# Patient Record
Sex: MALE | Race: WHITE | NOT HISPANIC OR LATINO | Employment: UNEMPLOYED | URBAN - METROPOLITAN AREA
[De-identification: names, ages, dates, MRNs, and addresses within clinical notes are randomized per-mention and may not be internally consistent; named-entity substitution may affect disease eponyms.]

---

## 2018-12-04 ENCOUNTER — HOSPITAL ENCOUNTER (OUTPATIENT)
Dept: OTHER | Facility: HOSPITAL | Age: 63
Discharge: HOME | End: 2018-12-04
Attending: UROLOGY | Admitting: UROLOGY

## 2018-12-04 LAB
ABO + RH BLD: NORMAL
ANION GAP SERPL CALC-SCNC: 10 MMOL/L (ref 10–20)
ARMBAND: NORMAL
BASOPHILS # BLD AUTO: 0 10*3/UL (ref 0–0.2)
BASOPHILS NFR BLD AUTO: 1 % (ref 0–2)
BLD COMPONENT TYPE: NORMAL
BLD GP AB SCN SERPL QL: NEGATIVE
BUN SERPL-MCNC: 19 MG/DL (ref 8–20)
BUN/CREAT SERPL: 14.6 (ref 6.2–20.3)
CALCIUM SERPL-MCNC: 9.2 MG/DL (ref 8.9–10.3)
CHLORIDE SERPL-SCNC: 102 MMOL/L (ref 101–111)
CONV CO2: 27 MMOL/L (ref 22–32)
CREAT UR-MCNC: 1.3 MG/DL (ref 0.7–1.2)
CROSSMATCH EXPIRATION: NORMAL
DIFFERENTIAL METHOD BLD: (no result)
EOSINOPHIL # BLD AUTO: 0.2 10*3/UL (ref 0–0.3)
EOSINOPHIL # BLD AUTO: 4 % (ref 0–3)
ERYTHROCYTE [DISTWIDTH] IN BLOOD BY AUTOMATED COUNT: 14.3 % (ref 11.5–14.5)
GLUCOSE SERPL-MCNC: 90 MG/DL (ref 65–99)
HCT VFR BLD AUTO: 37.9 % (ref 40–54)
HGB BLD-MCNC: 13.4 G/DL (ref 14–18)
LYMPHOCYTES # BLD AUTO: 1 10*3/UL (ref 0.8–4.8)
LYMPHOCYTES NFR BLD AUTO: 17 % (ref 18–42)
MCH RBC QN AUTO: 30.2 PG (ref 26–32)
MCHC RBC AUTO-ENTMCNC: 35.3 G/DL (ref 32–36)
MCV RBC AUTO: 85.6 FL (ref 80–94)
MONOCYTES # BLD AUTO: 0.5 10*3/UL (ref 0.1–1.3)
MONOCYTES NFR BLD AUTO: 8 % (ref 2–11)
NEUTROPHILS # BLD AUTO: 4.2 10*3/UL (ref 2.3–8.6)
NEUTROPHILS NFR BLD AUTO: 70 % (ref 50–75)
NRBC BLD AUTO-RTO: 0 /100{WBCS}
NRBC/RBC NFR BLD MANUAL: 0 10*3/UL
PLATELET # BLD AUTO: 357 10*3/UL (ref 150–450)
PMV BLD AUTO: 6 FL (ref 7.4–10.4)
POTASSIUM SERPL-SCNC: 4 MMOL/L (ref 3.6–5.1)
RBC # BLD AUTO: 4.43 10*6/UL (ref 4.6–6)
SODIUM SERPL-SCNC: 135 MMOL/L (ref 136–144)
WBC # BLD AUTO: 5.9 10*3/UL (ref 4.5–11.5)

## 2018-12-10 ENCOUNTER — HOSPITAL ENCOUNTER (OUTPATIENT)
Dept: OTHER | Facility: HOSPITAL | Age: 63
Setting detail: SPECIMEN
Discharge: HOME | End: 2018-12-10
Attending: UROLOGY | Admitting: UROLOGY

## 2018-12-11 LAB
SPECIMEN SOURCE: NORMAL
STONE ANALYSIS-IMP: NORMAL
STONE COMMENT: NORMAL

## 2019-01-01 ENCOUNTER — APPOINTMENT (OUTPATIENT)
Dept: GENERAL RADIOLOGY | Facility: HOSPITAL | Age: 64
End: 2019-01-01

## 2019-01-01 ENCOUNTER — HOSPITAL ENCOUNTER (INPATIENT)
Facility: HOSPITAL | Age: 64
LOS: 3 days | Discharge: NURSING, REHAB OR LONG-TERM CARE | End: 2019-01-05
Attending: EMERGENCY MEDICINE | Admitting: HOSPITALIST

## 2019-01-01 DIAGNOSIS — N10 PYELONEPHRITIS, ACUTE: Primary | ICD-10-CM

## 2019-01-01 LAB
ALBUMIN SERPL-MCNC: 3.9 G/DL (ref 3.5–5.2)
ALBUMIN/GLOB SERPL: 1.2 G/DL
ALP SERPL-CCNC: 96 U/L (ref 40–129)
ALT SERPL W P-5'-P-CCNC: 8 U/L (ref 5–41)
ANION GAP SERPL CALCULATED.3IONS-SCNC: 13.9 MMOL/L
AST SERPL-CCNC: 16 U/L (ref 5–40)
BACTERIA UR QL AUTO: ABNORMAL /HPF
BASOPHILS # BLD AUTO: 0.03 10*3/MM3 (ref 0–0.2)
BASOPHILS NFR BLD AUTO: 0.3 % (ref 0–2)
BILIRUB SERPL-MCNC: 0.3 MG/DL (ref 0.2–1.2)
BILIRUB UR QL STRIP: NEGATIVE
BUN BLD-MCNC: 17 MG/DL (ref 8–23)
BUN/CREAT SERPL: 12.1 (ref 7–25)
CALCIUM SPEC-SCNC: 9.4 MG/DL (ref 8.8–10.5)
CHLORIDE SERPL-SCNC: 98 MMOL/L (ref 98–107)
CLARITY UR: ABNORMAL
CO2 SERPL-SCNC: 22.1 MMOL/L (ref 22–29)
COLOR UR: YELLOW
CREAT BLD-MCNC: 1.41 MG/DL (ref 0.76–1.27)
D-LACTATE SERPL-SCNC: 2.3 MMOL/L (ref 0.5–2)
DEPRECATED RDW RBC AUTO: 41.6 FL (ref 37–54)
EOSINOPHIL # BLD AUTO: 0.02 10*3/MM3 (ref 0.1–0.3)
EOSINOPHIL NFR BLD AUTO: 0.2 % (ref 0–4)
ERYTHROCYTE [DISTWIDTH] IN BLOOD BY AUTOMATED COUNT: 13.2 % (ref 11.5–14.5)
FLUAV AG NPH QL: NEGATIVE
FLUBV AG NPH QL IA: NEGATIVE
GFR SERPL CREATININE-BSD FRML MDRD: 51 ML/MIN/1.73
GLOBULIN UR ELPH-MCNC: 3.3 GM/DL
GLUCOSE BLD-MCNC: 132 MG/DL (ref 65–99)
GLUCOSE UR STRIP-MCNC: NEGATIVE MG/DL
HCT VFR BLD AUTO: 38.5 % (ref 42–52)
HGB BLD-MCNC: 12.4 G/DL (ref 14–18)
HGB UR QL STRIP.AUTO: ABNORMAL
HYALINE CASTS UR QL AUTO: ABNORMAL /LPF
IMM GRANULOCYTES # BLD AUTO: 0.02 10*3/MM3 (ref 0–0.03)
IMM GRANULOCYTES NFR BLD AUTO: 0.2 % (ref 0–0.5)
KETONES UR QL STRIP: NEGATIVE
LEUKOCYTE ESTERASE UR QL STRIP.AUTO: ABNORMAL
LYMPHOCYTES # BLD AUTO: 0.5 10*3/MM3 (ref 0.6–4.8)
LYMPHOCYTES NFR BLD AUTO: 5.1 % (ref 20–45)
MCH RBC QN AUTO: 27.9 PG (ref 27–31)
MCHC RBC AUTO-ENTMCNC: 32.2 G/DL (ref 31–37)
MCV RBC AUTO: 86.7 FL (ref 80–94)
MONOCYTES # BLD AUTO: 0.67 10*3/MM3 (ref 0–1)
MONOCYTES NFR BLD AUTO: 6.9 % (ref 3–8)
NEUTROPHILS # BLD AUTO: 8.47 10*3/MM3 (ref 1.5–8.3)
NEUTROPHILS NFR BLD AUTO: 87.3 % (ref 45–70)
NITRITE UR QL STRIP: NEGATIVE
NRBC BLD AUTO-RTO: 0 /100 WBC (ref 0–0)
PH UR STRIP.AUTO: 6.5 [PH] (ref 4.5–8)
PLATELET # BLD AUTO: 265 10*3/MM3 (ref 140–500)
PMV BLD AUTO: 8.1 FL (ref 7.4–10.4)
POTASSIUM BLD-SCNC: 4.4 MMOL/L (ref 3.5–5.2)
PROCALCITONIN SERPL-MCNC: 0.09 NG/ML (ref 0.1–0.25)
PROT SERPL-MCNC: 7.2 G/DL (ref 6–8.5)
PROT UR QL STRIP: ABNORMAL
RBC # BLD AUTO: 4.44 10*6/MM3 (ref 4.7–6.1)
RBC # UR: ABNORMAL /HPF
REF LAB TEST METHOD: ABNORMAL
SODIUM BLD-SCNC: 134 MMOL/L (ref 136–145)
SP GR UR STRIP: 1.02 (ref 1–1.03)
SQUAMOUS #/AREA URNS HPF: ABNORMAL /HPF
UROBILINOGEN UR QL STRIP: ABNORMAL
WBC NRBC COR # BLD: 9.71 10*3/MM3 (ref 4.8–10.8)
WBC UR QL AUTO: ABNORMAL /HPF

## 2019-01-01 PROCEDURE — 72100 X-RAY EXAM L-S SPINE 2/3 VWS: CPT

## 2019-01-01 PROCEDURE — 87077 CULTURE AEROBIC IDENTIFY: CPT | Performed by: EMERGENCY MEDICINE

## 2019-01-01 PROCEDURE — 87186 SC STD MICRODIL/AGAR DIL: CPT | Performed by: EMERGENCY MEDICINE

## 2019-01-01 PROCEDURE — 99284 EMERGENCY DEPT VISIT MOD MDM: CPT | Performed by: EMERGENCY MEDICINE

## 2019-01-01 PROCEDURE — 81001 URINALYSIS AUTO W/SCOPE: CPT | Performed by: EMERGENCY MEDICINE

## 2019-01-01 PROCEDURE — 71046 X-RAY EXAM CHEST 2 VIEWS: CPT

## 2019-01-01 PROCEDURE — 87086 URINE CULTURE/COLONY COUNT: CPT | Performed by: EMERGENCY MEDICINE

## 2019-01-01 PROCEDURE — 80053 COMPREHEN METABOLIC PANEL: CPT | Performed by: EMERGENCY MEDICINE

## 2019-01-01 PROCEDURE — 83605 ASSAY OF LACTIC ACID: CPT | Performed by: EMERGENCY MEDICINE

## 2019-01-01 PROCEDURE — 87040 BLOOD CULTURE FOR BACTERIA: CPT | Performed by: EMERGENCY MEDICINE

## 2019-01-01 PROCEDURE — 85025 COMPLETE CBC W/AUTO DIFF WBC: CPT | Performed by: EMERGENCY MEDICINE

## 2019-01-01 PROCEDURE — 87804 INFLUENZA ASSAY W/OPTIC: CPT | Performed by: EMERGENCY MEDICINE

## 2019-01-01 PROCEDURE — 84145 PROCALCITONIN (PCT): CPT | Performed by: EMERGENCY MEDICINE

## 2019-01-01 PROCEDURE — 99284 EMERGENCY DEPT VISIT MOD MDM: CPT

## 2019-01-01 RX ORDER — SODIUM CHLORIDE 0.9 % (FLUSH) 0.9 %
10 SYRINGE (ML) INJECTION AS NEEDED
Status: DISCONTINUED | OUTPATIENT
Start: 2019-01-01 | End: 2019-01-05 | Stop reason: HOSPADM

## 2019-01-01 RX ORDER — ACETAMINOPHEN 500 MG
TABLET ORAL
Status: DISPENSED
Start: 2019-01-01 | End: 2019-01-02

## 2019-01-01 RX ORDER — ACETAMINOPHEN 500 MG
1000 TABLET ORAL ONCE
Status: COMPLETED | OUTPATIENT
Start: 2019-01-01 | End: 2019-01-01

## 2019-01-01 RX ADMIN — SODIUM CHLORIDE 1000 ML: 900 INJECTION, SOLUTION INTRAVENOUS at 23:17

## 2019-01-01 RX ADMIN — ACETAMINOPHEN 1000 MG: 500 TABLET, FILM COATED ORAL at 23:12

## 2019-01-02 ENCOUNTER — APPOINTMENT (OUTPATIENT)
Dept: CT IMAGING | Facility: HOSPITAL | Age: 64
End: 2019-01-02

## 2019-01-02 PROBLEM — A41.9 SEPSIS (HCC): Status: ACTIVE | Noted: 2019-01-02

## 2019-01-02 LAB
ANION GAP SERPL CALCULATED.3IONS-SCNC: 9.1 MMOL/L
BASOPHILS # BLD AUTO: 0.01 10*3/MM3 (ref 0–0.2)
BASOPHILS NFR BLD AUTO: 0.1 % (ref 0–2)
BUN BLD-MCNC: 17 MG/DL (ref 8–23)
BUN/CREAT SERPL: 12.3 (ref 7–25)
CALCIUM SPEC-SCNC: 8.1 MG/DL (ref 8.8–10.5)
CHLORIDE SERPL-SCNC: 103 MMOL/L (ref 98–107)
CO2 SERPL-SCNC: 22.9 MMOL/L (ref 22–29)
CREAT BLD-MCNC: 1.38 MG/DL (ref 0.76–1.27)
D-LACTATE SERPL-SCNC: 1.7 MMOL/L (ref 0.5–2)
DEPRECATED RDW RBC AUTO: 42.5 FL (ref 37–54)
EOSINOPHIL # BLD AUTO: 0.03 10*3/MM3 (ref 0.1–0.3)
EOSINOPHIL NFR BLD AUTO: 0.4 % (ref 0–4)
ERYTHROCYTE [DISTWIDTH] IN BLOOD BY AUTOMATED COUNT: 13.4 % (ref 11.5–14.5)
GFR SERPL CREATININE-BSD FRML MDRD: 52 ML/MIN/1.73
GLUCOSE BLD-MCNC: 95 MG/DL (ref 65–99)
HCT VFR BLD AUTO: 30.6 % (ref 42–52)
HGB BLD-MCNC: 9.9 G/DL (ref 14–18)
HOLD SPECIMEN: NORMAL
IMM GRANULOCYTES # BLD AUTO: 0.03 10*3/MM3 (ref 0–0.03)
IMM GRANULOCYTES NFR BLD AUTO: 0.4 % (ref 0–0.5)
LYMPHOCYTES # BLD AUTO: 0.35 10*3/MM3 (ref 0.6–4.8)
LYMPHOCYTES NFR BLD AUTO: 4.5 % (ref 20–45)
MCH RBC QN AUTO: 28.4 PG (ref 27–31)
MCHC RBC AUTO-ENTMCNC: 32.4 G/DL (ref 31–37)
MCV RBC AUTO: 87.7 FL (ref 80–94)
MONOCYTES # BLD AUTO: 0.66 10*3/MM3 (ref 0–1)
MONOCYTES NFR BLD AUTO: 8.5 % (ref 3–8)
NEUTROPHILS # BLD AUTO: 6.66 10*3/MM3 (ref 1.5–8.3)
NEUTROPHILS NFR BLD AUTO: 86.1 % (ref 45–70)
NRBC BLD AUTO-RTO: 0 /100 WBC (ref 0–0)
PLATELET # BLD AUTO: 222 10*3/MM3 (ref 140–500)
PMV BLD AUTO: 8.1 FL (ref 7.4–10.4)
POTASSIUM BLD-SCNC: 3.9 MMOL/L (ref 3.5–5.2)
RBC # BLD AUTO: 3.49 10*6/MM3 (ref 4.7–6.1)
SODIUM BLD-SCNC: 135 MMOL/L (ref 136–145)
WBC NRBC COR # BLD: 7.74 10*3/MM3 (ref 4.8–10.8)

## 2019-01-02 PROCEDURE — 74178 CT ABD&PLV WO CNTR FLWD CNTR: CPT

## 2019-01-02 PROCEDURE — 85025 COMPLETE CBC W/AUTO DIFF WBC: CPT | Performed by: INTERNAL MEDICINE

## 2019-01-02 PROCEDURE — 99223 1ST HOSP IP/OBS HIGH 75: CPT | Performed by: INTERNAL MEDICINE

## 2019-01-02 PROCEDURE — 25010000002 VANCOMYCIN 1 G RECONSTITUTED SOLUTION 1 EACH VIAL: Performed by: INTERNAL MEDICINE

## 2019-01-02 PROCEDURE — 83605 ASSAY OF LACTIC ACID: CPT | Performed by: EMERGENCY MEDICINE

## 2019-01-02 PROCEDURE — 0 IOPAMIDOL PER 1 ML: Performed by: INTERNAL MEDICINE

## 2019-01-02 PROCEDURE — 25010000002 MEROPENEM PER 100 MG: Performed by: INTERNAL MEDICINE

## 2019-01-02 PROCEDURE — 80048 BASIC METABOLIC PNL TOTAL CA: CPT | Performed by: INTERNAL MEDICINE

## 2019-01-02 RX ORDER — IBUPROFEN 800 MG/1
800 TABLET ORAL EVERY 8 HOURS PRN
COMMUNITY
End: 2020-11-23 | Stop reason: HOSPADM

## 2019-01-02 RX ORDER — ONDANSETRON 2 MG/ML
4 INJECTION INTRAMUSCULAR; INTRAVENOUS EVERY 6 HOURS PRN
Status: DISCONTINUED | OUTPATIENT
Start: 2019-01-02 | End: 2019-01-05 | Stop reason: HOSPADM

## 2019-01-02 RX ORDER — POLYETHYLENE GLYCOL 3350 17 G/17G
8.5 POWDER, FOR SOLUTION ORAL DAILY
COMMUNITY
End: 2020-11-23 | Stop reason: HOSPADM

## 2019-01-02 RX ORDER — PANTOPRAZOLE SODIUM 40 MG/1
40 TABLET, DELAYED RELEASE ORAL EVERY MORNING
Status: DISCONTINUED | OUTPATIENT
Start: 2019-01-02 | End: 2019-01-05 | Stop reason: HOSPADM

## 2019-01-02 RX ORDER — ONDANSETRON 4 MG/1
4 TABLET, ORALLY DISINTEGRATING ORAL EVERY 6 HOURS PRN
Status: DISCONTINUED | OUTPATIENT
Start: 2019-01-02 | End: 2019-01-05 | Stop reason: HOSPADM

## 2019-01-02 RX ORDER — ESCITALOPRAM OXALATE 10 MG/1
20 TABLET ORAL DAILY
Status: DISCONTINUED | OUTPATIENT
Start: 2019-01-02 | End: 2019-01-05 | Stop reason: HOSPADM

## 2019-01-02 RX ORDER — SODIUM CHLORIDE 9 MG/ML
INJECTION, SOLUTION INTRAVENOUS
Status: DISPENSED
Start: 2019-01-02 | End: 2019-01-02

## 2019-01-02 RX ORDER — CHOLECALCIFEROL (VITAMIN D3) 125 MCG
10 CAPSULE ORAL NIGHTLY
Status: DISCONTINUED | OUTPATIENT
Start: 2019-01-02 | End: 2019-01-05 | Stop reason: HOSPADM

## 2019-01-02 RX ORDER — SODIUM CHLORIDE 0.9 % (FLUSH) 0.9 %
3 SYRINGE (ML) INJECTION EVERY 12 HOURS SCHEDULED
Status: DISCONTINUED | OUTPATIENT
Start: 2019-01-02 | End: 2019-01-05 | Stop reason: HOSPADM

## 2019-01-02 RX ORDER — SODIUM CHLORIDE, SODIUM LACTATE, POTASSIUM CHLORIDE, CALCIUM CHLORIDE 600; 310; 30; 20 MG/100ML; MG/100ML; MG/100ML; MG/100ML
75 INJECTION, SOLUTION INTRAVENOUS CONTINUOUS
Status: DISCONTINUED | OUTPATIENT
Start: 2019-01-02 | End: 2019-01-05 | Stop reason: HOSPADM

## 2019-01-02 RX ORDER — SENNA AND DOCUSATE SODIUM 50; 8.6 MG/1; MG/1
2 TABLET, FILM COATED ORAL NIGHTLY PRN
Status: DISCONTINUED | OUTPATIENT
Start: 2019-01-02 | End: 2019-01-05 | Stop reason: HOSPADM

## 2019-01-02 RX ORDER — KETOCONAZOLE 20 MG/G
CREAM TOPICAL DAILY
COMMUNITY
End: 2020-11-23 | Stop reason: HOSPADM

## 2019-01-02 RX ORDER — QUETIAPINE FUMARATE 50 MG/1
150 TABLET, EXTENDED RELEASE ORAL NIGHTLY
Status: DISCONTINUED | OUTPATIENT
Start: 2019-01-02 | End: 2019-01-05 | Stop reason: HOSPADM

## 2019-01-02 RX ORDER — DOCUSATE SODIUM 100 MG/1
100 CAPSULE, LIQUID FILLED ORAL EVERY OTHER DAY
Status: DISCONTINUED | OUTPATIENT
Start: 2019-01-02 | End: 2019-01-05 | Stop reason: HOSPADM

## 2019-01-02 RX ORDER — SODIUM CHLORIDE 0.9 % (FLUSH) 0.9 %
1-10 SYRINGE (ML) INJECTION AS NEEDED
Status: DISCONTINUED | OUTPATIENT
Start: 2019-01-02 | End: 2019-01-05 | Stop reason: HOSPADM

## 2019-01-02 RX ORDER — ACETAMINOPHEN 325 MG/1
650 TABLET ORAL EVERY 6 HOURS PRN
COMMUNITY

## 2019-01-02 RX ORDER — CETIRIZINE HYDROCHLORIDE 10 MG/1
10 TABLET ORAL DAILY
Status: DISCONTINUED | OUTPATIENT
Start: 2019-01-02 | End: 2019-01-05 | Stop reason: HOSPADM

## 2019-01-02 RX ORDER — ACETAMINOPHEN 500 MG
TABLET ORAL
Status: COMPLETED
Start: 2019-01-02 | End: 2019-01-02

## 2019-01-02 RX ORDER — NALOXONE HCL 0.4 MG/ML
0.4 VIAL (ML) INJECTION
Status: DISCONTINUED | OUTPATIENT
Start: 2019-01-02 | End: 2019-01-05 | Stop reason: HOSPADM

## 2019-01-02 RX ORDER — TRIMETHOPRIM 100 MG/1
100 TABLET ORAL 2 TIMES DAILY
COMMUNITY
End: 2019-01-05 | Stop reason: HOSPADM

## 2019-01-02 RX ORDER — VANCOMYCIN HYDROCHLORIDE 1 G/20ML
INJECTION, POWDER, LYOPHILIZED, FOR SOLUTION INTRAVENOUS
Status: DISPENSED
Start: 2019-01-02 | End: 2019-01-02

## 2019-01-02 RX ORDER — DEXTROSE, SODIUM CHLORIDE, SODIUM LACTATE, POTASSIUM CHLORIDE, AND CALCIUM CHLORIDE 5; .6; .31; .03; .02 G/100ML; G/100ML; G/100ML; G/100ML; G/100ML
125 INJECTION, SOLUTION INTRAVENOUS CONTINUOUS
Status: DISCONTINUED | OUTPATIENT
Start: 2019-01-02 | End: 2019-01-02

## 2019-01-02 RX ORDER — ACETAMINOPHEN 325 MG/1
650 TABLET ORAL EVERY 4 HOURS PRN
Status: DISCONTINUED | OUTPATIENT
Start: 2019-01-02 | End: 2019-01-05 | Stop reason: HOSPADM

## 2019-01-02 RX ORDER — ONDANSETRON 4 MG/1
4 TABLET, FILM COATED ORAL EVERY 6 HOURS PRN
Status: DISCONTINUED | OUTPATIENT
Start: 2019-01-02 | End: 2019-01-05 | Stop reason: HOSPADM

## 2019-01-02 RX ORDER — ACETAMINOPHEN 500 MG
1000 TABLET ORAL ONCE
Status: DISCONTINUED | OUTPATIENT
Start: 2019-01-02 | End: 2019-01-05 | Stop reason: HOSPADM

## 2019-01-02 RX ORDER — AZTREONAM 1 G/1
INJECTION, POWDER, LYOPHILIZED, FOR SOLUTION INTRAMUSCULAR; INTRAVENOUS
Status: DISPENSED
Start: 2019-01-02 | End: 2019-01-02

## 2019-01-02 RX ORDER — SODIUM CHLORIDE 9 MG/ML
40 INJECTION, SOLUTION INTRAVENOUS AS NEEDED
Status: DISCONTINUED | OUTPATIENT
Start: 2019-01-02 | End: 2019-01-05 | Stop reason: HOSPADM

## 2019-01-02 RX ORDER — MEROPENEM 1 G/1
INJECTION, POWDER, FOR SOLUTION INTRAVENOUS
Status: DISPENSED
Start: 2019-01-02 | End: 2019-01-02

## 2019-01-02 RX ORDER — TAMSULOSIN HYDROCHLORIDE 0.4 MG/1
0.4 CAPSULE ORAL NIGHTLY
Status: DISCONTINUED | OUTPATIENT
Start: 2019-01-02 | End: 2019-01-05 | Stop reason: HOSPADM

## 2019-01-02 RX ADMIN — ACETAMINOPHEN 650 MG: 325 TABLET, FILM COATED ORAL at 20:52

## 2019-01-02 RX ADMIN — TAMSULOSIN HYDROCHLORIDE 0.4 MG: 0.4 CAPSULE ORAL at 20:31

## 2019-01-02 RX ADMIN — SODIUM CHLORIDE 1000 MG: 900 INJECTION, SOLUTION INTRAVENOUS at 05:13

## 2019-01-02 RX ADMIN — MEROPENEM 1 G: 1 INJECTION, POWDER, FOR SOLUTION INTRAVENOUS at 19:06

## 2019-01-02 RX ADMIN — PANTOPRAZOLE SODIUM 40 MG: 40 TABLET, DELAYED RELEASE ORAL at 06:25

## 2019-01-02 RX ADMIN — MEROPENEM 1 G: 1 INJECTION, POWDER, FOR SOLUTION INTRAVENOUS at 05:12

## 2019-01-02 RX ADMIN — MEROPENEM 1 G: 1 INJECTION, POWDER, FOR SOLUTION INTRAVENOUS at 10:30

## 2019-01-02 RX ADMIN — ACETAMINOPHEN 500 MG: 500 TABLET, FILM COATED ORAL at 14:24

## 2019-01-02 RX ADMIN — MELATONIN TAB 5 MG 10 MG: 5 TAB at 20:29

## 2019-01-02 RX ADMIN — DOCUSATE SODIUM 100 MG: 100 CAPSULE, LIQUID FILLED ORAL at 09:18

## 2019-01-02 RX ADMIN — CETIRIZINE HYDROCHLORIDE 10 MG: 10 TABLET, FILM COATED ORAL at 09:17

## 2019-01-02 RX ADMIN — QUETIAPINE FUMARATE 150 MG: 50 TABLET, EXTENDED RELEASE ORAL at 20:30

## 2019-01-02 RX ADMIN — SODIUM CHLORIDE 1000 MG: 900 INJECTION, SOLUTION INTRAVENOUS at 17:37

## 2019-01-02 RX ADMIN — ESCITALOPRAM OXALATE 20 MG: 10 TABLET, FILM COATED ORAL at 09:18

## 2019-01-02 RX ADMIN — SODIUM CHLORIDE, POTASSIUM CHLORIDE, SODIUM LACTATE AND CALCIUM CHLORIDE 125 ML/HR: 600; 310; 30; 20 INJECTION, SOLUTION INTRAVENOUS at 15:19

## 2019-01-02 RX ADMIN — SODIUM CHLORIDE, POTASSIUM CHLORIDE, SODIUM LACTATE AND CALCIUM CHLORIDE 125 ML/HR: 600; 310; 30; 20 INJECTION, SOLUTION INTRAVENOUS at 07:24

## 2019-01-02 RX ADMIN — IOPAMIDOL 100 ML: 755 INJECTION, SOLUTION INTRAVENOUS at 06:30

## 2019-01-02 RX ADMIN — SODIUM CHLORIDE, POTASSIUM CHLORIDE, SODIUM LACTATE AND CALCIUM CHLORIDE 1000 ML: 600; 310; 30; 20 INJECTION, SOLUTION INTRAVENOUS at 04:08

## 2019-01-02 RX ADMIN — SODIUM CHLORIDE, POTASSIUM CHLORIDE, SODIUM LACTATE AND CALCIUM CHLORIDE 125 ML/HR: 600; 310; 30; 20 INJECTION, SOLUTION INTRAVENOUS at 23:22

## 2019-01-02 RX ADMIN — ACETAMINOPHEN 650 MG: 325 TABLET, FILM COATED ORAL at 05:24

## 2019-01-02 NOTE — ED PROVIDER NOTES
Subjective   History of Present Illness  History of Present Illness    Chief complaint: Fall with possible back injury    Location: Patient fell [place]    Quality/Severity:  Minor fall    Timing/Duration: Patient fell approximately one hour prior to presentation    Modifying Factors: On December 31 the patient did have outpatient urologic surgery    Associated Symptoms: Patient having more difficulty with ambulation than usual    Narrative: The patient is a 63-year-old white male who presents as noted above.  The patient does have some mental retardation and does not communicate well.  It was reported that the patient fell out of his bed onto a tile floor.  Caregiver reports that the patient seldom complains of pain and it was noted that he was acting as if he were in pain and not walking well.    Review of Systems   Reason unable to perform ROS: Review of systems not obtainable due to lack of verbal skills.       Past Medical History:   Diagnosis Date   • Coronary artery disease    • Injury of back    • Kidney stones    • Mental retardation        Allergies   Allergen Reactions   • Keflex [Cephalexin]        Past Surgical History:   Procedure Laterality Date   • KIDNEY SURGERY     • TONSILLECTOMY         History reviewed. No pertinent family history.    Social History     Socioeconomic History   • Marital status: Single     Spouse name: Not on file   • Number of children: Not on file   • Years of education: Not on file   • Highest education level: Not on file   Tobacco Use   • Smoking status: Never Smoker   Substance and Sexual Activity   • Alcohol use: No   • Drug use: No           Objective   Physical Exam   Constitutional:   The patient is a 63-year-old, white male who appears older than his stated age.  No acute distress noted.   HENT:   Head: Normocephalic and atraumatic.   Eyes: Conjunctivae and EOM are normal. Pupils are equal, round, and reactive to light.   Neck: Normal range of  motion. Neck supple.   Cardiovascular: Regular rhythm and normal heart sounds.   Tachycardia   Pulmonary/Chest: Effort normal and breath sounds normal. No respiratory distress.   Occasional, mild, dry cough.   Abdominal: Soft. Bowel sounds are normal.   Musculoskeletal:   Left flank with friends bandages in place.  No visible staining of the bandages and there was no surrounding erythema.  The area was tender to palpation.   Neurological: He is alert.   Skin: Skin is dry.   The integument was noticeably hot to touch.       Procedures           ED Course  ED Course as of Jan 02 0124   Tue Jan 01, 2019   2247 Rectal temperature 102.3°F.  Fever workup already instituted.  [ML]   Wed Jan 02, 2019   0056 Case and findings discussed with the hospitalist, Dr. Cummings, who requested urology be consult.  Dr. Ayala was contacted concerning this patient of Dr. Ambrocio.  Patient will be admitted to the hospitalist here and Dr. Cummings spoken to again.  Caregiver informed of treatment plan and they were agreeable.  [ML]      ED Course User Index  [ML] Yosef Dominguez MD                  MDM  Number of Diagnoses or Management Options  Pyelonephritis, acute: new and requires workup     Amount and/or Complexity of Data Reviewed  Clinical lab tests: ordered and reviewed  Tests in the radiology section of CPT®: ordered and reviewed  Independent visualization of images, tracings, or specimens: yes    Risk of Complications, Morbidity, and/or Mortality  Presenting problems: high  Diagnostic procedures: high  Management options: high    Critical Care  Total time providing critical care: 30-74 minutes    Patient Progress  Patient progress: stable    Labs this visit  Lab Results (last 24 hours)     Procedure Component Value Units Date/Time    Blood Culture - Blood, Hand, Right [[id number]] Collected:  01/01/19 2300    Specimen:  Blood from Hand, Right Updated:  01/02/19 0053    CBC & Differential [[id number]] Collected:  01/01/19 2303     Specimen:  Blood Updated:  01/01/19 2319    Narrative:       The following orders were created for panel order CBC & Differential.  Procedure                               Abnormality         Status                     ---------                               -----------         ------                     CBC Auto Differential[[id number]]        Abnormal            Final result                 Please view results for these tests on the individual orders.    Comprehensive Metabolic Panel [[id number]]  (Abnormal) Collected:  01/01/19 2303    Specimen:  Blood Updated:  01/01/19 2354     Glucose 132 mg/dL      BUN 17 mg/dL      Creatinine 1.41 mg/dL      Sodium 134 mmol/L      Potassium 4.4 mmol/L      Chloride 98 mmol/L      CO2 22.1 mmol/L      Calcium 9.4 mg/dL      Total Protein 7.2 g/dL      Albumin 3.90 g/dL      ALT (SGPT) 8 U/L      AST (SGOT) 16 U/L      Alkaline Phosphatase 96 U/L      Total Bilirubin 0.3 mg/dL      eGFR Non African Amer 51 mL/min/1.73      Globulin 3.3 gm/dL      A/G Ratio 1.2 g/dL      BUN/Creatinine Ratio 12.1     Anion Gap 13.9 mmol/L     Urinalysis With Microscopic If Indicated (No Culture) - Urine, Catheter [[id number]]  (Abnormal) Collected:  01/01/19 2303    Specimen:  Urine, Catheter Updated:  01/01/19 2329     Color, UA Yellow     Appearance, UA Slightly Cloudy     pH, UA 6.5     Specific Gravity, UA 1.025     Glucose, UA Negative     Ketones, UA Negative     Bilirubin, UA Negative     Blood, UA Large (3+)     Protein, UA >=300 mg/dL (3+)     Leuk Esterase, UA Moderate (2+)     Nitrite, UA Negative     Urobilinogen, UA 0.2 E.U./dL    Lactic Acid, Plasma [[id number]]  (Abnormal) Collected:  01/01/19 2303    Specimen:  Blood Updated:  01/01/19 2344     Lactate 2.3 mmol/L     Procalcitonin [[id number]]  (Abnormal) Collected:  01/01/19 2303    Specimen:  Blood Updated:  01/01/19 2345     Procalcitonin 0.09 ng/mL     Narrative:       As a Marker for Sepsis (Non-Neonates):   1. <0.5 ng/mL  represents a low risk of severe sepsis and/or septic shock.  2. >2 ng/mL represents a high risk of severe sepsis and/or septic shock.    As a Marker for Lower Respiratory Tract Infections that require antibiotic therapy:    PCT on Admission     Antibiotic Therapy       6-12 Hrs later  > 0.5                Strongly Recommended             >0.25 - <0.5         Recommended  0.1 - 0.25           Discouraged              Remeasure/reassess PCT  <0.1                 Strongly Discouraged     Remeasure/reassess PCT                     PCT values of < 0.5 ng/mL do not exclude an infection, because localized infections (without systemic signs) may be associated with such low concentrations, or a systemic infection in its initial stages (< 6 hours). Furthermore, increased PCT can occur without infection. PCT concentrations between 0.5 and 2.0 ng/mL should be interpreted taking into account the patient's history. It is recommended to retest PCT within 6-24 hours if any concentrations < 2 ng/mL are obtained.    Influenza Antigen, Rapid - Swab, Nasopharynx [[id number]]  (Normal) Collected:  01/01/19 2303    Specimen:  Swab from Nasopharynx Updated:  01/01/19 2331     Influenza A Ag, EIA Negative     Influenza B Ag, EIA Negative    CBC Auto Differential [[id number]]  (Abnormal) Collected:  01/01/19 2303    Specimen:  Blood Updated:  01/01/19 2319     WBC 9.71 10*3/mm3      RBC 4.44 10*6/mm3      Hemoglobin 12.4 g/dL      Hematocrit 38.5 %      MCV 86.7 fL      MCH 27.9 pg      MCHC 32.2 g/dL      RDW 13.2 %      RDW-SD 41.6 fl      MPV 8.1 fL      Platelets 265 10*3/mm3      Neutrophil % 87.3 %      Lymphocyte % 5.1 %      Monocyte % 6.9 %      Eosinophil % 0.2 %      Basophil % 0.3 %      Immature Grans % 0.2 %      Neutrophils, Absolute 8.47 10*3/mm3      Lymphocytes, Absolute 0.50 10*3/mm3      Monocytes, Absolute 0.67 10*3/mm3      Eosinophils, Absolute 0.02 10*3/mm3      Basophils, Absolute 0.03 10*3/mm3      Immature Grans,  Absolute 0.02 10*3/mm3      nRBC 0.0 /100 WBC     Urinalysis, Microscopic Only - Urine, Catheter [[id number]]  (Abnormal) Collected:  01/01/19 2303    Specimen:  Urine, Catheter Updated:  01/01/19 2331     RBC, UA 6-12 /HPF      WBC, UA 3-5 /HPF      Bacteria, UA 2+ /HPF      Squamous Epithelial Cells, UA None Seen /HPF      Hyaline Casts, UA None Seen /LPF      Methodology Manual Light Microscopy    Lactic Acid, Reflex Timer (This will reflex a repeat order 3-3:15 hours after ordered.) [[id number]] Collected:  01/01/19 2303    Specimen:  Blood Updated:  01/01/19 2344    Urine Culture - Urine, Urine, Clean Catch [[id number]] Collected:  01/01/19 2303    Specimen:  Urine, Clean Catch Updated:  01/02/19 0008        Prescribed on discharge             Medication List      No changes were made to your prescriptions during this visit.       All lab results, imaging results and other tests were reviewed by Yosef Dominguez MD and unless otherwise specified were found to be unremarkable.        Final diagnoses:   Pyelonephritis, acute            Yosef Dominguez MD  01/02/19 0124

## 2019-01-02 NOTE — NURSING NOTE
Discharge Planning Assessment  JOANN Nelson     Patient Name: Arian Persaud  MRN: [id number]  Today's Date: 1/2/2019    Admit Date: 1/1/2019    Discharge Needs Assessment     Row Name 01/02/19 1056       Living Environment    Lives With  facility resident    Name(s) of Who Lives With Patient  [facility]: he has 2 roommates    Current Living Arrangements  residential facility    Primary Care Provided by  self;other (see comments) [facility] staff    Provides Primary Care For  no one, unable/limited ability to care for self    Family Caregiver if Needed  none    Quality of Family Relationships  other (see comments) no family: they recently found a nephew    Able to Return to Prior Arrangements  yes       Resource/Environmental Concerns    Resource/Environmental Concerns  none    Transportation Concerns  car, none [facility] transport       Transition Planning    Patient/Family Anticipates Transition to  home    Patient/Family Anticipated Services at Transition  none    Transportation Anticipated  agency       Discharge Needs Assessment    Readmission Within the Last 30 Days  no previous admission in last 30 days    Concerns to be Addressed  no discharge needs identified;denies needs/concerns at this time    Equipment Currently Used at Home  rollator    Anticipated Changes Related to Illness  none    Equipment Needed After Discharge  none    Offered/Gave Vendor List  no        Discharge Plan     Row Name 01/02/19 1101       Plan    Plan  home to [facility]     Patient/Family in Agreement with Plan  yes    Plan Comments  spoke with patient and Poncho from [facility] at bedside. patient was agreeable for  to talk with staff member from [facility]. patient lives in a residential house with two other roommates. it is a one level home. he has not used HH or home O2/neb. patient is able to ambulate with rollator. no other DME required. he needs assist with ADL's including bathing, dressing and meal prep.  he is able to feed himself. staff is there in home 24/7. they provide transportation as needed. he will use [facility] if needed but they have medications delivered once a month from [facility]. he does have a state guardian. staff member states they recently found his nephew but no other family members are active in his care. PCP was added to face sheet. plan is back to [facility] when medically stable. will continue to follow.         Destination      No service coordination in this encounter.      Durable Medical Equipment      No service coordination in this encounter.      Dialysis/Infusion      No service coordination in this encounter.      Home Medical Care      No service coordination in this encounter.      Community Resources      No service coordination in this encounter.          Demographic Summary     Row Name 01/02/19 1052       General Information    Admission Type  inpatient    Arrived From  home [facility]    Referral Source  admission list    Reason for Consult  discharge planning    Preferred Language  English     Used During This Interaction  no       Contact Information    Permission Granted to Share Info With  ;guardian;other (see comments) [facility] staff        Functional Status    No documentation.       Psychosocial    No documentation.       Abuse/Neglect    No documentation.       Legal    No documentation.       Substance Abuse    No documentation.       Patient Forms    No documentation.           Alexia Perla RN

## 2019-01-02 NOTE — ED NOTES
Fluids are finished. No additional fluids at this time per Dr Dominguez.      Jakub Quezada, RN  01/02/19 0141

## 2019-01-02 NOTE — H&P
[facility] HOSPITALIST     Provider, No Known    CHIEF COMPLAINT: Fell out of bed    HISTORY OF PRESENT ILLNESS:    64 yo WM w/ PMH of Chronic Left Nephrolithiasis, CAD, HTN, Mental Delays w/ dementia (Resident of [facility]), who was seen at [facility] yesterday (12/31) for an otpt placement of a Double J Ureteral Stent with removal of a left indwelling percutaneous nephrostomy tube by Jace Gatica of IR. Got PPX Levaquin prior to the procedure. WBC was 6.9, /97, , and T 97.6 at time of procedure.     Pt brought into [facility] ER tonight because he fell out of bed. Speaking to Staff caregiver at bedside, he was his usual self, did not notice any difference in pt's mental status. Baseline, AOx1 (Knows self, can state who the president is, and the day, but doesn't know place or year). Caregiver states pt is a poor historian, under reports pain, and confabulates symptoms, especially to women.     He was on abx prior to getting his nephrostomy tube and after. However, was not sent home with abx after his stent placement yesterday.  Though discharge Documentation from [facility] indicate both Cipro and Bactrium on his discharge med list. Unclear if these were drugs listed from prior admissions.      History limited due to Baseline and acute MS, history obtained from chart review and speaking with care giver from [facility].    Past Medical History:   Diagnosis Date   • Coronary artery disease    • Injury of back    • Kidney stones    • Mental retardation      Past Surgical History:   Procedure Laterality Date   • KIDNEY SURGERY     • TONSILLECTOMY       History reviewed. No pertinent family history.  Social History     Tobacco Use   • Smoking status: Never Smoker   Substance Use Topics   • Alcohol use: No   • Drug use: No     Medications Prior to Admission   Medication Sig Dispense Refill Last Dose   • calcium carbonate (OS-JOSHUA) 600 MG tablet Take 600 mg by mouth daily. WITH 400 UNITS OF VIT  "D   8/9/2016 at 0700   • docusate sodium (COLACE) 100 MG capsule Take 100 mg by mouth every other day.   8/8/2016 at 0800   • donepezil (ARICEPT) 5 MG tablet Take 5 mg by mouth.   8/8/2016 at 2100   • escitalopram (LEXAPRO) 20 MG tablet Take 20 mg by mouth daily.   8/9/2016 at 0700   • loratadine (CLARITIN) 10 MG tablet Take 10 mg by mouth daily.   8/9/2016 at 0700   • losartan (COZAAR) 50 MG tablet Take 50 mg by mouth daily.      • memantine (NAMENDA) 5 MG tablet Take 5 mg by mouth 2 (two) times a day.   8/9/2016 at 0700   • omeprazole (PriLOSEC) 40 MG capsule Take 40 mg by mouth daily.   8/9/2016 at 0700   • QUEtiapine XR (SEROquel XR) 150 MG 24 hr tablet Take 150 mg by mouth every night.   8/8/2016 at 2100   • tamsulosin (FLOMAX) 0.4 MG capsule 24 hr capsule Take 1 capsule by mouth every night.   8/8/2016 at 2100     Allergies:  Keflex [cephalexin]  Debilities: As per HPI and Physical Exam.     REVIEW OF SYSTEMS:  Please see the above history of present illness for pertinent positives and negatives.  The remainder of the patient's systems have been reviewed and are negative.     Vital Signs  Temp:  [98.3 °F (36.8 °C)-102.3 °F (39.1 °C)] 99.3 °F (37.4 °C)  Heart Rate:  [] 101  Resp:  [16-18] 16  BP: ()/(61-94) 100/61    Flowsheet Rows      First Filed Value   Admission Height  165.1 cm (65\") Documented at 01/01/2019 2209   Admission Weight  68 kg (150 lb) Documented at 01/01/2019 2209           Physical Exam:  Physical Exam   Constitutional: He appears well-nourished. No distress.   HENT:   Head: Normocephalic and atraumatic.   Right Ear: External ear normal.   Left Ear: External ear normal.   Nose: Nose normal.   Mouth/Throat: Oropharynx is clear and moist.   Eyes: Conjunctivae and EOM are normal. Pupils are equal, round, and reactive to light. No scleral icterus.   Neck: Neck supple. No JVD present. No tracheal deviation present.   Cardiovascular: Normal rate, regular rhythm and normal heart sounds. " Exam reveals no friction rub.   No murmur heard.  Pulmonary/Chest: Effort normal and breath sounds normal. No stridor. No respiratory distress. He has no wheezes.   Abdominal: He exhibits no mass. There is tenderness. There is no guarding.   Diminished bs  Abdomen with some give, but not fully soft, not an acute surgical abdomen  ?Small amount of distension  Tender on the left, but unclear to what extent   Musculoskeletal: He exhibits no edema or tenderness.   Neurological: He exhibits normal muscle tone.   Awakens to voice, follows commands, moves all 4 extermities, quickly falls back asleep if not directly engaged.     Could not recall current president, which he can do at baseline  CN grossly intact  No focal deficits   Skin: Skin is warm and dry. He is not diaphoretic. No erythema.   Psychiatric:   Deferred mood, constricted Affect  Slowed delayed speech   Nursing note and vitals reviewed.       Results Review:    I reviewed the patient's new clinical results.  Lab Results (most recent)     Procedure Component Value Units Date/Time    Blood Culture - Blood, Hand, Right [[id number]] Collected:  01/01/19 2300    Specimen:  Blood from Hand, Right Updated:  01/02/19 0053    Blood Culture - Blood, Arm, Left [[id number]] Collected:  01/01/19 0010    Specimen:  Blood from Arm, Left Updated:  01/02/19 0052    Urine Culture - Urine, Urine, Clean Catch [[id number]] Collected:  01/01/19 2303    Specimen:  Urine, Clean Catch Updated:  01/02/19 0008    Comprehensive Metabolic Panel [[id number]]  (Abnormal) Collected:  01/01/19 2303    Specimen:  Blood Updated:  01/01/19 2354     Glucose 132 mg/dL      BUN 17 mg/dL      Creatinine 1.41 mg/dL      Sodium 134 mmol/L      Potassium 4.4 mmol/L      Chloride 98 mmol/L      CO2 22.1 mmol/L      Calcium 9.4 mg/dL      Total Protein 7.2 g/dL      Albumin 3.90 g/dL      ALT (SGPT) 8 U/L      AST (SGOT) 16 U/L      Alkaline Phosphatase 96 U/L      Total Bilirubin 0.3 mg/dL      eGFR Non   Amer 51 mL/min/1.73      Globulin 3.3 gm/dL      A/G Ratio 1.2 g/dL      BUN/Creatinine Ratio 12.1     Anion Gap 13.9 mmol/L     Procalcitonin [[id number]]  (Abnormal) Collected:  01/01/19 2303    Specimen:  Blood Updated:  01/01/19 2345     Procalcitonin 0.09 ng/mL     Narrative:       As a Marker for Sepsis (Non-Neonates):   1. <0.5 ng/mL represents a low risk of severe sepsis and/or septic shock.  2. >2 ng/mL represents a high risk of severe sepsis and/or septic shock.    As a Marker for Lower Respiratory Tract Infections that require antibiotic therapy:    PCT on Admission     Antibiotic Therapy       6-12 Hrs later  > 0.5                Strongly Recommended             >0.25 - <0.5         Recommended  0.1 - 0.25           Discouraged              Remeasure/reassess PCT  <0.1                 Strongly Discouraged     Remeasure/reassess PCT                     PCT values of < 0.5 ng/mL do not exclude an infection, because localized infections (without systemic signs) may be associated with such low concentrations, or a systemic infection in its initial stages (< 6 hours). Furthermore, increased PCT can occur without infection. PCT concentrations between 0.5 and 2.0 ng/mL should be interpreted taking into account the patient's history. It is recommended to retest PCT within 6-24 hours if any concentrations < 2 ng/mL are obtained.    Lactic Acid, Plasma [[id number]]  (Abnormal) Collected:  01/01/19 2303    Specimen:  Blood Updated:  01/01/19 2344     Lactate 2.3 mmol/L     Lactic Acid, Reflex Timer (This will reflex a repeat order 3-3:15 hours after ordered.) [[id number]] Collected:  01/01/19 2303    Specimen:  Blood Updated:  01/01/19 2344    Influenza Antigen, Rapid - Swab, Nasopharynx [[id number]]  (Normal) Collected:  01/01/19 2303    Specimen:  Swab from Nasopharynx Updated:  01/01/19 2331     Influenza A Ag, EIA Negative     Influenza B Ag, EIA Negative    Urinalysis, Microscopic Only - Urine, Catheter  [[id number]]  (Abnormal) Collected:  01/01/19 2303    Specimen:  Urine, Catheter Updated:  01/01/19 2331     RBC, UA 6-12 /HPF      WBC, UA 3-5 /HPF      Bacteria, UA 2+ /HPF      Squamous Epithelial Cells, UA None Seen /HPF      Hyaline Casts, UA None Seen /LPF      Methodology Manual Light Microscopy    Urinalysis With Microscopic If Indicated (No Culture) - Urine, Catheter [[id number]]  (Abnormal) Collected:  01/01/19 2303    Specimen:  Urine, Catheter Updated:  01/01/19 2329     Color, UA Yellow     Appearance, UA Slightly Cloudy     pH, UA 6.5     Specific Gravity, UA 1.025     Glucose, UA Negative     Ketones, UA Negative     Bilirubin, UA Negative     Blood, UA Large (3+)     Protein, UA >=300 mg/dL (3+)     Leuk Esterase, UA Moderate (2+)     Nitrite, UA Negative     Urobilinogen, UA 0.2 E.U./dL    CBC & Differential [[id number]] Collected:  01/01/19 2303    Specimen:  Blood Updated:  01/01/19 2319    Narrative:       The following orders were created for panel order CBC & Differential.  Procedure                               Abnormality         Status                     ---------                               -----------         ------                     CBC Auto Differential[[id number]]        Abnormal            Final result                 Please view results for these tests on the individual orders.    CBC Auto Differential [[id number]]  (Abnormal) Collected:  01/01/19 2303    Specimen:  Blood Updated:  01/01/19 2319     WBC 9.71 10*3/mm3      RBC 4.44 10*6/mm3      Hemoglobin 12.4 g/dL      Hematocrit 38.5 %      MCV 86.7 fL      MCH 27.9 pg      MCHC 32.2 g/dL      RDW 13.2 %      RDW-SD 41.6 fl      MPV 8.1 fL      Platelets 265 10*3/mm3      Neutrophil % 87.3 %      Lymphocyte % 5.1 %      Monocyte % 6.9 %      Eosinophil % 0.2 %      Basophil % 0.3 %      Immature Grans % 0.2 %      Neutrophils, Absolute 8.47 10*3/mm3      Lymphocytes, Absolute 0.50 10*3/mm3      Monocytes, Absolute 0.67 10*3/mm3       Eosinophils, Absolute 0.02 10*3/mm3      Basophils, Absolute 0.03 10*3/mm3      Immature Grans, Absolute 0.02 10*3/mm3      nRBC 0.0 /100 WBC           Imaging Results (most recent)     Procedure Component Value Units Date/Time    XR Chest 2 View [[id number]] Resulted:  01/02/19 0123     Updated:  01/02/19 0123    XR Spine Lumbar 2 or 3 View [[id number]] Resulted:  01/02/19 0123     Updated:  01/02/19 0123        reviewed    ECG/EMG Results (most recent)     None        reviewed    Assessment/Plan     Pt new to this provider.    Likely Severe Sepsis of likely Urinary Source after recent urologic interventions w AMS on baseline cogitative declines  - T 102.3 in ER w/ HR of 134 and WBC 9.7, BP 92/67 and Lactate 2.3  - Allergic to Cephalexin, with unknown reaction  - ER consulted urology, Got Aztreonam in ER  - Will switch to Meropenem for now, with vanc due to recent instrumentation  - Only culture in [facility] system was Proteus from 4/16/18  - Lactate was elevated, will trend per sepsis protocol  - Blood and urine cultured in the ER  - CT scan to look for hydronephrosis or other abdominal etiology  - Pt NPO, with ice chips and sips  - Giving further 1L bolus of LR, w/ placement of an additional large bore IV  - Blood pressures at this time are improving, but will monitor  - Pt usually w/ SBP of 140's-160's, if lactate/sbp doesn't improve may need to move pt to unit    Fall  - Xray reads show no acute frxs    HTN  - 40mmHg below usual SBP, will hold home meds for now    I discussed the patients findings and my recommendations with patient and caregiver.     Elvi Cummings MD  01/02/19  3:05 AM

## 2019-01-02 NOTE — NURSING NOTE
Spoke with Macario Francisco, at [phone] (On call Guardianship), consent obtained for Abdomen/pelvis CT with contrast. Verified per 2 witnesses; this nurse and GABBY Nails RN, see consent.

## 2019-01-02 NOTE — PROGRESS NOTES
Pharmacokinetic Consult - Vancomycin Dosing    Arian Persaud is a 63 y.o. male who has been consulted for vancomycin dosing for UTI/sepsis.    Relevant clinical data and objective history reviewed:  Lab Results   Component Value Date/Time    CREATININE 1.41 (H) 01/01/2019 11:03 PM    CREATININE 0.98 06/05/2014 09:11 AM    BUN 17 01/01/2019 11:03 PM    BUN 18 06/05/2014 09:11 AM     Estimated Creatinine Clearance: 52.8 mL/min (A) (by C-G formula based on SCr of 1.41 mg/dL (H)).  No intake/output data recorded.  Lab Results   Component Value Date/Time    WBC 9.71 01/01/2019 11:03 PM    HGB 12.4 (L) 01/01/2019 11:03 PM    HCT 38.5 (L) 01/01/2019 11:03 PM    MCV 86.7 01/01/2019 11:03 PM     01/01/2019 11:03 PM     Temp Readings from Last 3 Encounters:   01/02/19 99.4 °F (37.4 °C) (Oral)   08/09/16 98.8 °F (37.1 °C)     Weight: 67.8 kg (149 lb 6 oz)      Assessment/Plan  The patient will be started on vancomycin utilizing scheduled dosing based on actual body weight.  Will initiate dose at 1000 mg IV every 12 hours.  Pharmacy will follow closely. Serum creatinine will be ordered per policy.  Plan for random vanc level as patient approaches steady state, prior to the 4th dose.  Due to infection severity, will target a trough of 15-20 ug/mL.  Pharmacy will continue to follow the patient’s culture results and clinical progress daily.    Stephenie Robin RPH  1/2/2019  6:24 AM

## 2019-01-02 NOTE — PLAN OF CARE
Problem: Patient Care Overview  Goal: Discharge Needs Assessment  Outcome: Ongoing (interventions implemented as appropriate)   01/02/19 1056   Discharge Needs Assessment   Readmission Within the Last 30 Days no previous admission in last 30 days   Concerns to be Addressed no discharge needs identified;denies needs/concerns at this time   Patient/Family Anticipates Transition to home   Patient/Family Anticipated Services at Transition none   Transportation Concerns car, none  ([employer] transport)   Transportation Anticipated agency   Anticipated Changes Related to Illness none   Equipment Needed After Discharge none   Offered/Gave Vendor List no   Disability   Equipment Currently Used at Home rollator

## 2019-01-03 LAB
ANION GAP SERPL CALCULATED.3IONS-SCNC: 10.3 MMOL/L
BUN BLD-MCNC: 22 MG/DL (ref 8–23)
BUN/CREAT SERPL: 15.8 (ref 7–25)
CALCIUM SPEC-SCNC: 8.2 MG/DL (ref 8.8–10.5)
CHLORIDE SERPL-SCNC: 103 MMOL/L (ref 98–107)
CO2 SERPL-SCNC: 23.7 MMOL/L (ref 22–29)
CREAT BLD-MCNC: 1.39 MG/DL (ref 0.76–1.27)
GFR SERPL CREATININE-BSD FRML MDRD: 52 ML/MIN/1.73
GLUCOSE BLD-MCNC: 102 MG/DL (ref 65–99)
POTASSIUM BLD-SCNC: 4.1 MMOL/L (ref 3.5–5.2)
SODIUM BLD-SCNC: 137 MMOL/L (ref 136–145)

## 2019-01-03 PROCEDURE — 80048 BASIC METABOLIC PNL TOTAL CA: CPT | Performed by: INTERNAL MEDICINE

## 2019-01-03 PROCEDURE — 25010000002 VANCOMYCIN 1 G RECONSTITUTED SOLUTION 1 EACH VIAL

## 2019-01-03 PROCEDURE — 25010000002 MEROPENEM PER 100 MG: Performed by: INTERNAL MEDICINE

## 2019-01-03 PROCEDURE — 99232 SBSQ HOSP IP/OBS MODERATE 35: CPT | Performed by: NURSE PRACTITIONER

## 2019-01-03 RX ADMIN — ACETAMINOPHEN 650 MG: 325 TABLET, FILM COATED ORAL at 16:01

## 2019-01-03 RX ADMIN — SODIUM CHLORIDE, POTASSIUM CHLORIDE, SODIUM LACTATE AND CALCIUM CHLORIDE 125 ML/HR: 600; 310; 30; 20 INJECTION, SOLUTION INTRAVENOUS at 08:02

## 2019-01-03 RX ADMIN — MEROPENEM 1 G: 1 INJECTION, POWDER, FOR SOLUTION INTRAVENOUS at 12:13

## 2019-01-03 RX ADMIN — ACETAMINOPHEN 650 MG: 325 TABLET, FILM COATED ORAL at 04:29

## 2019-01-03 RX ADMIN — CETIRIZINE HYDROCHLORIDE 10 MG: 10 TABLET, FILM COATED ORAL at 09:04

## 2019-01-03 RX ADMIN — MEROPENEM 1 G: 1 INJECTION, POWDER, FOR SOLUTION INTRAVENOUS at 02:26

## 2019-01-03 RX ADMIN — MEROPENEM 1 G: 1 INJECTION, POWDER, FOR SOLUTION INTRAVENOUS at 18:52

## 2019-01-03 RX ADMIN — SODIUM CHLORIDE 1000 MG: 900 INJECTION, SOLUTION INTRAVENOUS at 05:17

## 2019-01-03 RX ADMIN — ACETAMINOPHEN 650 MG: 325 TABLET, FILM COATED ORAL at 12:12

## 2019-01-03 RX ADMIN — QUETIAPINE FUMARATE 150 MG: 50 TABLET, EXTENDED RELEASE ORAL at 20:38

## 2019-01-03 RX ADMIN — MELATONIN TAB 5 MG 10 MG: 5 TAB at 20:38

## 2019-01-03 RX ADMIN — PANTOPRAZOLE SODIUM 40 MG: 40 TABLET, DELAYED RELEASE ORAL at 06:12

## 2019-01-03 RX ADMIN — TAMSULOSIN HYDROCHLORIDE 0.4 MG: 0.4 CAPSULE ORAL at 20:40

## 2019-01-03 RX ADMIN — ESCITALOPRAM OXALATE 20 MG: 10 TABLET, FILM COATED ORAL at 09:04

## 2019-01-03 RX ADMIN — SODIUM CHLORIDE, PRESERVATIVE FREE 3 ML: 5 INJECTION INTRAVENOUS at 09:04

## 2019-01-03 RX ADMIN — SODIUM CHLORIDE, POTASSIUM CHLORIDE, SODIUM LACTATE AND CALCIUM CHLORIDE 75 ML/HR: 600; 310; 30; 20 INJECTION, SOLUTION INTRAVENOUS at 16:06

## 2019-01-03 NOTE — CONSULTS
Referring Provider: lopez  Reason for Consultatiouti    Patient Care Team:  Hyacinth Elizabeth MD as PCP - General (Internal Medicine)    Chief complaint  Infection     Subjective .     History of present illness: 63 yowm nursing home hx stones recent perc stone ans internal stent placement developed post procedure confusion chills rigors fever pt not ambulatory hx struvite stones proteus uti  Diapers no hematuria  Pain     Review of Systems  NEG 12 POINT ROS PERTINENT IN HPI     Past Medical History:   Diagnosis Date   • Coronary artery disease    • Injury of back    • Kidney stones    • Mental retardation      Past Surgical History:   Procedure Laterality Date   • KIDNEY SURGERY     • TONSILLECTOMY       Family History   Family history unknown: Yes     Social History     Tobacco Use   • Smoking status: Never Smoker   Substance Use Topics   • Alcohol use: No   • Drug use: No     Medications Prior to Admission   Medication Sig Dispense Refill Last Dose   • calcium carbonate (OS-JOSHUA) 600 MG tablet Take 600 mg by mouth daily. WITH 400 UNITS OF VIT D   1/1/2019 at Unknown time   • docusate sodium (COLACE) 100 MG capsule Take 100 mg by mouth every other day.   12/30/2018   • donepezil (ARICEPT) 5 MG tablet Take 5 mg by mouth.   1/1/2019 at Unknown time   • escitalopram (LEXAPRO) 20 MG tablet Take 20 mg by mouth daily.   1/1/2019 at Unknown time   • loratadine (CLARITIN) 10 MG tablet Take 10 mg by mouth daily.   1/1/2019 at Unknown time   • losartan (COZAAR) 50 MG tablet Take 50 mg by mouth daily.   1/1/2019 at Unknown time   • memantine (NAMENDA) 5 MG tablet Take 5 mg by mouth 2 (two) times a day.   1/1/2019 at Unknown time   • omeprazole (PriLOSEC) 40 MG capsule Take 40 mg by mouth daily.   1/1/2019 at Unknown time   • QUEtiapine XR (SEROquel XR) 150 MG 24 hr tablet Take 150 mg by mouth every night.   1/1/2019 at Unknown time   • tamsulosin (FLOMAX) 0.4 MG capsule 24 hr capsule Take 1 capsule by mouth every  "night.   1/1/2019 at Unknown time   • trimethoprim (TRIMPEX) 100 MG tablet Take 100 mg by mouth 2 (Two) Times a Day.   1/1/2019 at Unknown time   • acetaminophen (TYLENOL) 325 MG tablet Take 650 mg by mouth Every 6 (Six) Hours As Needed for Mild Pain .   Unknown at Unknown time   • ibuprofen (ADVIL,MOTRIN) 800 MG tablet Take 800 mg by mouth Every 8 (Eight) Hours As Needed for Mild Pain  (as needed for pain).   Unknown at Unknown time   • ketoconazole (NIZORAL) 2 % cream Apply  topically to the appropriate area as directed Daily.   Unknown at Unknown time   • mineral oil 55 % oil oral liquid Take 30 mL by mouth 1 (One) Time.   Unknown at Unknown time   • polyethylene glycol (MIRALAX) packet Take 17 g by mouth Daily.   Unknown at Unknown time       acetaminophen 1,000 mg Oral Once   aztreonam 2 g Intravenous Once   cetirizine 10 mg Oral Daily   docusate sodium 100 mg Oral Every Other Day   escitalopram 20 mg Oral Daily   melatonin 10 mg Oral Nightly   meropenem 1 g Intravenous Q8H   pantoprazole 40 mg Oral QAM   QUEtiapine  mg Oral Nightly   sodium chloride 3 mL Intravenous Q12H   tamsulosin 0.4 mg Oral Nightly   vancomycin 15 mg/kg Intravenous Q12H     Allergies:   Keflex [cephalexin]    Objective     Vital Signs   Temp:  [97.5 °F (36.4 °C)-103.9 °F (39.9 °C)] 98 °F (36.7 °C)  Heart Rate:  [] 106  Resp:  [16-18] 16  BP: (105-152)/(57-78) 122/65    Intake/Output Summary (Last 24 hours) at 1/3/2019 0709  Last data filed at 1/2/2019 1737  Gross per 24 hour   Intake 350 ml   Output --   Net 350 ml     Flowsheet Rows      First Filed Value   Admission Height  165.1 cm (65\") Documented at 01/01/2019 2209   Admission Weight  68 kg (150 lb) Documented at 01/01/2019 2209          Physical Exam:     General Appearance:    Alert, cooperative, in no acute distress   Head:    Normocephalic, without obvious abnormality, atraumatic   Eyes:            Lids and lashes normal, conjunctivae and sclerae normal, no   icterus, " no pallor, corneas clear,   Ears:    Ears appear intact with no abnormalities noted   Throat:   no thrush, oral mucosa moist   Neck:   No adenopathy, supple, trachea midline, no thyromegaly,, no JVD   Back:     No kyphosis present, no scoliosis present, no skin lesions,      erythema or scars, no tenderness to percussion or                   palpation,   range of motion normal  WOUND PERC TRACT CLEAN    Lungs:   respirations regular, even and                  unlabored    Heart:    Regular rhythm and normal rate,    Chest Wall:    No abnormalities observed   Abdomen:     Normal bowel sounds, no masses, no organomegaly, soft        non-tender, non-distended, no guarding, no rebound                tenderness   Rectal:     Deferred      DIAPER PENS SCROTUM TESTES PERINEUM  NO ERYTHEMA SWELLING    Extremities:    Pulses:   Pulses palpable and equal bilaterally   Skin:   No bleeding, bruising or rash   Lymph nodes:   No palpable adenopathy   Neurologic:  flexion contractures of all ext        Results Review:   I reviewed the patient's new clinical results. inc  Ct scan films report   Results for orders placed or performed during the hospital encounter of 01/01/19   Influenza Antigen, Rapid - Swab, Nasopharynx   Result Value Ref Range    Influenza A Ag, EIA Negative Negative    Influenza B Ag, EIA Negative Negative   Blood Culture - Blood, Hand, Right   Result Value Ref Range    Blood Culture No growth at 24 hours    Blood Culture - Blood, Arm, Left   Result Value Ref Range    Blood Culture No growth at 24 hours    Urine Culture - Urine, Urine, Clean Catch   Result Value Ref Range    Urine Culture Culture in progress    Comprehensive Metabolic Panel   Result Value Ref Range    Glucose 132 (H) 65 - 99 mg/dL    BUN 17 8 - 23 mg/dL    Creatinine 1.41 (H) 0.76 - 1.27 mg/dL    Sodium 134 (L) 136 - 145 mmol/L    Potassium 4.4 3.5 - 5.2 mmol/L    Chloride 98 98 - 107 mmol/L    CO2 22.1 22.0 - 29.0 mmol/L    Calcium 9.4 8.8 -  10.5 mg/dL    Total Protein 7.2 6.0 - 8.5 g/dL    Albumin 3.90 3.50 - 5.20 g/dL    ALT (SGPT) 8 5 - 41 U/L    AST (SGOT) 16 5 - 40 U/L    Alkaline Phosphatase 96 40 - 129 U/L    Total Bilirubin 0.3 0.2 - 1.2 mg/dL    eGFR Non African Amer 51 (L) >60 mL/min/1.73    Globulin 3.3 gm/dL    A/G Ratio 1.2 g/dL    BUN/Creatinine Ratio 12.1 7.0 - 25.0    Anion Gap 13.9 mmol/L   Urinalysis With Microscopic If Indicated (No Culture) - Urine, Catheter   Result Value Ref Range    Color, UA Yellow Yellow, Straw    Appearance, UA Slightly Cloudy (A) Clear    pH, UA 6.5 4.5 - 8.0    Specific Gravity, UA 1.025 1.003 - 1.030    Glucose, UA Negative Negative    Ketones, UA Negative Negative, 80 mg/dL (3+), >=160 mg/dL (4+)    Bilirubin, UA Negative Negative    Blood, UA Large (3+) (A) Negative    Protein, UA >=300 mg/dL (3+) (A) Negative    Leuk Esterase, UA Moderate (2+) (A) Negative    Nitrite, UA Negative Negative    Urobilinogen, UA 0.2 E.U./dL 0.2 - 1.0 E.U./dL   Lactic Acid, Plasma   Result Value Ref Range    Lactate 2.3 (C) 0.5 - 2.0 mmol/L   Procalcitonin   Result Value Ref Range    Procalcitonin 0.09 (L) 0.10 - 0.25 ng/mL   CBC Auto Differential   Result Value Ref Range    WBC 9.71 4.80 - 10.80 10*3/mm3    RBC 4.44 (L) 4.70 - 6.10 10*6/mm3    Hemoglobin 12.4 (L) 14.0 - 18.0 g/dL    Hematocrit 38.5 (L) 42.0 - 52.0 %    MCV 86.7 80.0 - 94.0 fL    MCH 27.9 27.0 - 31.0 pg    MCHC 32.2 31.0 - 37.0 g/dL    RDW 13.2 11.5 - 14.5 %    RDW-SD 41.6 37.0 - 54.0 fl    MPV 8.1 7.4 - 10.4 fL    Platelets 265 140 - 500 10*3/mm3    Neutrophil % 87.3 (H) 45.0 - 70.0 %    Lymphocyte % 5.1 (L) 20.0 - 45.0 %    Monocyte % 6.9 3.0 - 8.0 %    Eosinophil % 0.2 0.0 - 4.0 %    Basophil % 0.3 0.0 - 2.0 %    Immature Grans % 0.2 0.0 - 0.5 %    Neutrophils, Absolute 8.47 (H) 1.50 - 8.30 10*3/mm3    Lymphocytes, Absolute 0.50 (L) 0.60 - 4.80 10*3/mm3    Monocytes, Absolute 0.67 0.00 - 1.00 10*3/mm3    Eosinophils, Absolute 0.02 (L) 0.10 - 0.30 10*3/mm3     Basophils, Absolute 0.03 0.00 - 0.20 10*3/mm3    Immature Grans, Absolute 0.02 0.00 - 0.03 10*3/mm3    nRBC 0.0 0.0 - 0.0 /100 WBC   Urinalysis, Microscopic Only - Urine, Catheter   Result Value Ref Range    RBC, UA 6-12 (A) None Seen /HPF    WBC, UA 3-5 (A) None Seen /HPF    Bacteria, UA 2+ (A) None Seen /HPF    Squamous Epithelial Cells, UA None Seen None Seen, 0-2 /HPF    Hyaline Casts, UA None Seen None Seen /LPF    Methodology Manual Light Microscopy    Lactic Acid, Reflex Timer (This will reflex a repeat order 3-3:15 hours after ordered.)   Result Value Ref Range    Extra Tube Hold for add-ons.    Lactic Acid, Reflex   Result Value Ref Range    Lactate 1.7 0.5 - 2.0 mmol/L   Basic Metabolic Panel   Result Value Ref Range    Glucose 95 65 - 99 mg/dL    BUN 17 8 - 23 mg/dL    Creatinine 1.38 (H) 0.76 - 1.27 mg/dL    Sodium 135 (L) 136 - 145 mmol/L    Potassium 3.9 3.5 - 5.2 mmol/L    Chloride 103 98 - 107 mmol/L    CO2 22.9 22.0 - 29.0 mmol/L    Calcium 8.1 (L) 8.8 - 10.5 mg/dL    eGFR Non African Amer 52 (L) >60 mL/min/1.73    BUN/Creatinine Ratio 12.3 7.0 - 25.0    Anion Gap 9.1 mmol/L   CBC Auto Differential   Result Value Ref Range    WBC 7.74 4.80 - 10.80 10*3/mm3    RBC 3.49 (L) 4.70 - 6.10 10*6/mm3    Hemoglobin 9.9 (L) 14.0 - 18.0 g/dL    Hematocrit 30.6 (L) 42.0 - 52.0 %    MCV 87.7 80.0 - 94.0 fL    MCH 28.4 27.0 - 31.0 pg    MCHC 32.4 31.0 - 37.0 g/dL    RDW 13.4 11.5 - 14.5 %    RDW-SD 42.5 37.0 - 54.0 fl    MPV 8.1 7.4 - 10.4 fL    Platelets 222 140 - 500 10*3/mm3    Neutrophil % 86.1 (H) 45.0 - 70.0 %    Lymphocyte % 4.5 (L) 20.0 - 45.0 %    Monocyte % 8.5 (H) 3.0 - 8.0 %    Eosinophil % 0.4 0.0 - 4.0 %    Basophil % 0.1 0.0 - 2.0 %    Immature Grans % 0.4 0.0 - 0.5 %    Neutrophils, Absolute 6.66 1.50 - 8.30 10*3/mm3    Lymphocytes, Absolute 0.35 (L) 0.60 - 4.80 10*3/mm3    Monocytes, Absolute 0.66 0.00 - 1.00 10*3/mm3    Eosinophils, Absolute 0.03 (L) 0.10 - 0.30 10*3/mm3    Basophils,  Absolute 0.01 0.00 - 0.20 10*3/mm3    Immature Grans, Absolute 0.03 0.00 - 0.03 10*3/mm3    nRBC 0.0 0.0 - 0.0 /100 WBC   Basic Metabolic Panel   Result Value Ref Range    Glucose 102 (H) 65 - 99 mg/dL    BUN 22 8 - 23 mg/dL    Creatinine 1.39 (H) 0.76 - 1.27 mg/dL    Sodium 137 136 - 145 mmol/L    Potassium 4.1 3.5 - 5.2 mmol/L    Chloride 103 98 - 107 mmol/L    CO2 23.7 22.0 - 29.0 mmol/L    Calcium 8.2 (L) 8.8 - 10.5 mg/dL    eGFR Non African Amer 52 (L) >60 mL/min/1.73    BUN/Creatinine Ratio 15.8 7.0 - 25.0    Anion Gap 10.3 mmol/L     No procedure found.  Assessment/Plan       Sepsis (CMS/HCC)       pyelonephritis  Post recent stone pcnl and internal stent placement  Prior culture [facility] proteus cont merem can stop vanc   Cultures pending  Prior culture sensitive to levaquin     I discussed the patients findings and my recommendations with patient and nursing staff    Adrian Espana MD  01/03/19  7:09 AM

## 2019-01-03 NOTE — PROGRESS NOTES
"SERVICE: [facility] HOSPITALIST    CONSULTANTS: Urology    CHIEF COMPLAINT: f/u pyelonephritis    SUBJECTIVE: The patient notes he is shivering, feels like running fever.  Caregiver at bedside and notes fevers all night long.  He reports patient is still eating and drinking well, ambulating with his walker as able.  He otherwise denies cough/soa/chest pain/n/v/d/abdominal pain or other new concerns.    OBJECTIVE:    /57 (BP Location: Left arm, Patient Position: Lying)   Pulse 90   Temp 99.7 °F (37.6 °C) (Axillary)   Resp 18   Ht 165.1 cm (65\")   Wt 67.8 kg (149 lb 6 oz)   SpO2 96%   BMI 24.86 kg/m²     MEDS/LABS REVIEWED AND ORDERED    acetaminophen 1,000 mg Oral Once   aztreonam 2 g Intravenous Once   cetirizine 10 mg Oral Daily   docusate sodium 100 mg Oral Every Other Day   escitalopram 20 mg Oral Daily   melatonin 10 mg Oral Nightly   meropenem 1 g Intravenous Q8H   pantoprazole 40 mg Oral QAM   QUEtiapine  mg Oral Nightly   sodium chloride 3 mL Intravenous Q12H   tamsulosin 0.4 mg Oral Nightly     Physical Exam   Constitutional: He appears well-developed and well-nourished.   HENT:   Head: Normocephalic and atraumatic.   Eyes: EOM are normal. Pupils are equal, round, and reactive to light.   Cardiovascular: Normal rate and regular rhythm.   Pulmonary/Chest: Effort normal and breath sounds normal.   Abdominal: Soft. Bowel sounds are normal.   Musculoskeletal: He exhibits no edema.   Neurological: He is alert.   Oriented to self   Skin: Skin is warm and dry.   Vitals reviewed.    LAB/DIAGNOSTICS:    Lab Results (last 24 hours)     Procedure Component Value Units Date/Time    Urine Culture - Urine, Urine, Clean Catch [[id number]]  (Abnormal) Collected:  01/01/19 2303    Specimen:  Urine, Clean Catch Updated:  01/03/19 1017     Urine Culture >100,000 CFU/mL Enterococcus species    Basic Metabolic Panel [[id number]]  (Abnormal) Collected:  01/03/19 0339    Specimen:  Blood Updated: "  01/03/19 0503     Glucose 102 mg/dL      BUN 22 mg/dL      Creatinine 1.39 mg/dL      Sodium 137 mmol/L      Potassium 4.1 mmol/L      Chloride 103 mmol/L      CO2 23.7 mmol/L      Calcium 8.2 mg/dL      eGFR Non African Amer 52 mL/min/1.73      BUN/Creatinine Ratio 15.8     Anion Gap 10.3 mmol/L     Narrative:       GFR Normal >60  Chronic Kidney Disease <60  Kidney Failure <15    Blood Culture - Blood, Hand, Right [[id number]] Collected:  01/01/19 2300    Specimen:  Blood from Hand, Right Updated:  01/03/19 0101     Blood Culture No growth at 24 hours    Blood Culture - Blood, Arm, Left [[id number]] Collected:  01/01/19 0010    Specimen:  Blood from Arm, Left Updated:  01/03/19 0101     Blood Culture No growth at 24 hours        No orders to display        Ct Abdomen Pelvis With & Without Contrast    Result Date: 1/2/2019  1. Recently placed left ureteral stent is in satisfactory position as detailed above. Mild left hydronephrosis is noted. 2. Patchy enhancement of the left kidney likely reflects inflammation or infection. Clinical correlation is recommended. 3. Nonobstructing renal stones bilaterally, left greater than right. 4. The gallbladder is not visualized. It is presumed surgically absent. 5. Small hepatic cysts. 6. Mild splenomegaly.  Initial stat report to the patient's nurse by Dr. Morgan at 5:45 AM on 1/2/2019.  This report was finalized on 1/2/2019 7:49 AM by Dr. Jose Diaz MD.      Xr Chest 2 View    Result Date: 1/2/2019  No active pulmonary disease.  This report was finalized on 1/2/2019 8:12 AM by Dr. Jose Diaz MD.      Xr Spine Lumbar 2 Or 3 View    Result Date: 1/2/2019  1. Mild degenerative change in the lumbar spine. No acute abnormality. 2. Multiple large left renal calcifications with left ureteral stent in place.  This report was finalized on 1/2/2019 7:59 AM by Dr. Jose Diaz MD.      ASSESSMENT/PLAN:  Sepsis secondary to acute Enterococcus UTI/pyelonephritis secondary to  recent instrumentation secondary to nephrostomy tube placement 12/31/18 at [facility] secondary to nephrolithiasis:   Lactic acidosis secondary to sepsis: urology following  Continue meropenem, d/c vancomyin per urology rec, await C&S  Continues with fevers to nearly 104 overnight  WBCC now normal/Lactate normal  Continue IV hydration, decrease to 75 ml/hr  Continue flomax  Monitor closely     Recent fall, likely secondary to sepsis: no acute fracture identified     Hypertension with hypotension: BP at goal, continue to hold home meds, give IVFs  monitor     CKD 3: baseline creatinine approx 1.2   Creatinine on admit 1.41, now 1.39     Acute post operative anemia: Most recent hemoglobin 12.0 at [facility] on 12/31/2018, currently hemoglobin 9.9, likely some dilutional effect  No active blood loss noted     Mental retardation: lives at [facility], caregiver at bedside    PLAN FOR DISPOSITION:    EMMA Chandler  Hospitalist, [facility]  01/01/2019  7:34 AM

## 2019-01-04 LAB
ANION GAP SERPL CALCULATED.3IONS-SCNC: 6.8 MMOL/L
BACTERIA SPEC AEROBE CULT: ABNORMAL
BASOPHILS # BLD AUTO: 0.02 10*3/MM3 (ref 0–0.2)
BASOPHILS NFR BLD AUTO: 0.3 % (ref 0–2)
BUN BLD-MCNC: 18 MG/DL (ref 8–23)
BUN/CREAT SERPL: 15.1 (ref 7–25)
CALCIUM SPEC-SCNC: 8.2 MG/DL (ref 8.8–10.5)
CHLORIDE SERPL-SCNC: 102 MMOL/L (ref 98–107)
CO2 SERPL-SCNC: 25.2 MMOL/L (ref 22–29)
CREAT BLD-MCNC: 1.19 MG/DL (ref 0.76–1.27)
DEPRECATED RDW RBC AUTO: 41.3 FL (ref 37–54)
EOSINOPHIL # BLD AUTO: 0.04 10*3/MM3 (ref 0.1–0.3)
EOSINOPHIL NFR BLD AUTO: 0.5 % (ref 0–4)
ERYTHROCYTE [DISTWIDTH] IN BLOOD BY AUTOMATED COUNT: 13.2 % (ref 11.5–14.5)
GFR SERPL CREATININE-BSD FRML MDRD: 62 ML/MIN/1.73
GLUCOSE BLD-MCNC: 115 MG/DL (ref 65–99)
HCT VFR BLD AUTO: 30.6 % (ref 42–52)
HGB BLD-MCNC: 9.9 G/DL (ref 14–18)
IMM GRANULOCYTES # BLD AUTO: 0.02 10*3/MM3 (ref 0–0.03)
IMM GRANULOCYTES NFR BLD AUTO: 0.3 % (ref 0–0.5)
LYMPHOCYTES # BLD AUTO: 0.73 10*3/MM3 (ref 0.6–4.8)
LYMPHOCYTES NFR BLD AUTO: 9.9 % (ref 20–45)
MAGNESIUM SERPL-MCNC: 1.7 MG/DL (ref 1.7–2.5)
MCH RBC QN AUTO: 28 PG (ref 27–31)
MCHC RBC AUTO-ENTMCNC: 32.4 G/DL (ref 31–37)
MCV RBC AUTO: 86.4 FL (ref 80–94)
MONOCYTES # BLD AUTO: 0.79 10*3/MM3 (ref 0–1)
MONOCYTES NFR BLD AUTO: 10.7 % (ref 3–8)
NEUTROPHILS # BLD AUTO: 5.78 10*3/MM3 (ref 1.5–8.3)
NEUTROPHILS NFR BLD AUTO: 78.3 % (ref 45–70)
NRBC BLD AUTO-RTO: 0 /100 WBC (ref 0–0)
PLATELET # BLD AUTO: 158 10*3/MM3 (ref 140–500)
PMV BLD AUTO: 8.6 FL (ref 7.4–10.4)
POTASSIUM BLD-SCNC: 3.4 MMOL/L (ref 3.5–5.2)
RBC # BLD AUTO: 3.54 10*6/MM3 (ref 4.7–6.1)
SODIUM BLD-SCNC: 134 MMOL/L (ref 136–145)
WBC NRBC COR # BLD: 7.38 10*3/MM3 (ref 4.8–10.8)

## 2019-01-04 PROCEDURE — 85025 COMPLETE CBC W/AUTO DIFF WBC: CPT | Performed by: NURSE PRACTITIONER

## 2019-01-04 PROCEDURE — 80048 BASIC METABOLIC PNL TOTAL CA: CPT | Performed by: INTERNAL MEDICINE

## 2019-01-04 PROCEDURE — 25010000002 VANCOMYCIN 1 G RECONSTITUTED SOLUTION 1 EACH VIAL: Performed by: INTERNAL MEDICINE

## 2019-01-04 PROCEDURE — 83735 ASSAY OF MAGNESIUM: CPT | Performed by: NURSE PRACTITIONER

## 2019-01-04 PROCEDURE — 25010000002 MEROPENEM PER 100 MG: Performed by: INTERNAL MEDICINE

## 2019-01-04 PROCEDURE — 25010000002 MAGNESIUM SULFATE 2 GM/50ML SOLUTION: Performed by: NURSE PRACTITIONER

## 2019-01-04 PROCEDURE — 99232 SBSQ HOSP IP/OBS MODERATE 35: CPT | Performed by: NURSE PRACTITIONER

## 2019-01-04 RX ORDER — MAGNESIUM SULFATE HEPTAHYDRATE 40 MG/ML
2 INJECTION, SOLUTION INTRAVENOUS AS NEEDED
Status: DISCONTINUED | OUTPATIENT
Start: 2019-01-04 | End: 2019-01-05 | Stop reason: HOSPADM

## 2019-01-04 RX ORDER — POTASSIUM CHLORIDE 1.5 G/1.77G
40 POWDER, FOR SOLUTION ORAL AS NEEDED
Status: DISCONTINUED | OUTPATIENT
Start: 2019-01-04 | End: 2019-01-05 | Stop reason: HOSPADM

## 2019-01-04 RX ORDER — MAGNESIUM SULFATE HEPTAHYDRATE 40 MG/ML
4 INJECTION, SOLUTION INTRAVENOUS AS NEEDED
Status: DISCONTINUED | OUTPATIENT
Start: 2019-01-04 | End: 2019-01-05 | Stop reason: HOSPADM

## 2019-01-04 RX ORDER — LINEZOLID 600 MG/1
600 TABLET, FILM COATED ORAL 2 TIMES DAILY
Qty: 20 TABLET | Refills: 0 | Status: SHIPPED | OUTPATIENT
Start: 2019-01-04 | End: 2019-01-14

## 2019-01-04 RX ORDER — POTASSIUM CHLORIDE 7.45 MG/ML
10 INJECTION INTRAVENOUS
Status: DISCONTINUED | OUTPATIENT
Start: 2019-01-04 | End: 2019-01-05 | Stop reason: HOSPADM

## 2019-01-04 RX ORDER — MAGNESIUM SULFATE 1 G/100ML
1 INJECTION INTRAVENOUS AS NEEDED
Status: DISCONTINUED | OUTPATIENT
Start: 2019-01-04 | End: 2019-01-05 | Stop reason: HOSPADM

## 2019-01-04 RX ORDER — SACCHAROMYCES BOULARDII 250 MG
250 CAPSULE ORAL 2 TIMES DAILY
Status: DISCONTINUED | OUTPATIENT
Start: 2019-01-04 | End: 2019-01-05 | Stop reason: HOSPADM

## 2019-01-04 RX ORDER — POTASSIUM CHLORIDE 20 MEQ/1
40 TABLET, EXTENDED RELEASE ORAL AS NEEDED
Status: DISCONTINUED | OUTPATIENT
Start: 2019-01-04 | End: 2019-01-05 | Stop reason: HOSPADM

## 2019-01-04 RX ADMIN — TAMSULOSIN HYDROCHLORIDE 0.4 MG: 0.4 CAPSULE ORAL at 21:25

## 2019-01-04 RX ADMIN — MAGNESIUM SULFATE HEPTAHYDRATE 2 G: 40 INJECTION, SOLUTION INTRAVENOUS at 16:35

## 2019-01-04 RX ADMIN — DOCUSATE SODIUM 100 MG: 100 CAPSULE, LIQUID FILLED ORAL at 11:01

## 2019-01-04 RX ADMIN — Medication 250 MG: at 21:25

## 2019-01-04 RX ADMIN — POTASSIUM CHLORIDE 40 MEQ: 1500 TABLET, EXTENDED RELEASE ORAL at 16:34

## 2019-01-04 RX ADMIN — MEROPENEM 1 G: 1 INJECTION, POWDER, FOR SOLUTION INTRAVENOUS at 04:41

## 2019-01-04 RX ADMIN — ESCITALOPRAM OXALATE 20 MG: 10 TABLET, FILM COATED ORAL at 11:00

## 2019-01-04 RX ADMIN — POTASSIUM CHLORIDE 40 MEQ: 1500 TABLET, EXTENDED RELEASE ORAL at 21:25

## 2019-01-04 RX ADMIN — SODIUM CHLORIDE 1000 MG: 900 INJECTION, SOLUTION INTRAVENOUS at 16:34

## 2019-01-04 RX ADMIN — SODIUM CHLORIDE, PRESERVATIVE FREE 3 ML: 5 INJECTION INTRAVENOUS at 21:33

## 2019-01-04 RX ADMIN — CETIRIZINE HYDROCHLORIDE 10 MG: 10 TABLET, FILM COATED ORAL at 11:00

## 2019-01-04 RX ADMIN — SODIUM CHLORIDE, PRESERVATIVE FREE 3 ML: 5 INJECTION INTRAVENOUS at 11:01

## 2019-01-04 RX ADMIN — SODIUM CHLORIDE, POTASSIUM CHLORIDE, SODIUM LACTATE AND CALCIUM CHLORIDE 75 ML/HR: 600; 310; 30; 20 INJECTION, SOLUTION INTRAVENOUS at 20:04

## 2019-01-04 RX ADMIN — SODIUM CHLORIDE, POTASSIUM CHLORIDE, SODIUM LACTATE AND CALCIUM CHLORIDE 75 ML/HR: 600; 310; 30; 20 INJECTION, SOLUTION INTRAVENOUS at 04:50

## 2019-01-04 RX ADMIN — QUETIAPINE FUMARATE 150 MG: 50 TABLET, EXTENDED RELEASE ORAL at 21:25

## 2019-01-04 RX ADMIN — PANTOPRAZOLE SODIUM 40 MG: 40 TABLET, DELAYED RELEASE ORAL at 06:10

## 2019-01-04 RX ADMIN — MEROPENEM 1 G: 1 INJECTION, POWDER, FOR SOLUTION INTRAVENOUS at 11:07

## 2019-01-04 RX ADMIN — MELATONIN TAB 5 MG 10 MG: 5 TAB at 21:25

## 2019-01-04 NOTE — NURSING NOTE
Continued Stay Note  JOANN Nelson     Patient Name: Arian Persaud  MRN: [id number]  Today's Date: 1/4/2019    Admit Date: 1/1/2019    Discharge Plan     Row Name 01/04/19 1439       Plan    Plan Comments  spoke with Poncho from [employer]. updated IMM. Poncho states patient is doing better and appetite has returned. will continue to follow.         Discharge Codes    No documentation.             Alexia Perla RN

## 2019-01-04 NOTE — PROGRESS NOTES
Pharmacokinetic Consult - Vancomycin Dosing    Arian Persaud is a 63 y.o. male who has been consulted for vancomycin dosing for UTI.    Relevant clinical data and objective history reviewed:  Lab Results   Component Value Date/Time    CREATININE 1.19 01/04/2019 03:34 AM    CREATININE 1.39 (H) 01/03/2019 03:39 AM    CREATININE 1.38 (H) 01/02/2019 05:08 AM    BUN 18 01/04/2019 03:34 AM    BUN 22 01/03/2019 03:39 AM    BUN 17 01/02/2019 05:08 AM     Estimated Creatinine Clearance: 62.5 mL/min (by C-G formula based on SCr of 1.19 mg/dL).  I/O last 3 completed shifts:  In: 360 [P.O.:360]  Out: -   Lab Results   Component Value Date/Time    WBC 7.38 01/04/2019 03:34 AM    HGB 9.9 (L) 01/04/2019 03:34 AM    HCT 30.6 (L) 01/04/2019 03:34 AM    MCV 86.4 01/04/2019 03:34 AM     01/04/2019 03:34 AM     Temp Readings from Last 3 Encounters:   01/04/19 99.5 °F (37.5 °C) (Oral)   08/09/16 98.8 °F (37.1 °C)     Weight: 67.8 kg (149 lb 6 oz)      Assessment/Plan  The patient will be started on vancomycin utilizing scheduled dosing based on actual body weight. Will initiate dose at 1000 mg IV every 12 hours.  Pharmacy will also follow closely for s/sx of nephrotoxicity.  Serum creatinine will be ordered per policy.  Plan for trough as patient approaches steady state, prior to the 4th dose.  Due to infection severity, will target a trough of 15-20 ug/mL.  Pharmacy will continue to follow the patient’s culture results and clinical progress daily.    Stephenie Robin RPH  1/4/2019  2:31 PM

## 2019-01-04 NOTE — PROGRESS NOTES
"SERVICE: [facility] HOSPITALIST    CONSULTANTS: Urology    CHIEF COMPLAINT: f/u pyelonephritis    SUBJECTIVE: Caregiver reports good night, less fevers. Patient is sleepy at time of exam. Tolerating po, no n/v/d. Staff deny cough/soa/chest pain/n/v/d/abdominal pain or other new concerns.    OBJECTIVE:    /63 (BP Location: Left arm, Patient Position: Lying)   Pulse 77   Temp 97.4 °F (36.3 °C) (Oral)   Resp 18   Ht 165.1 cm (65\")   Wt 67.8 kg (149 lb 6 oz)   SpO2 93%   BMI 24.86 kg/m²     MEDS/LABS REVIEWED AND ORDERED    acetaminophen 1,000 mg Oral Once   cetirizine 10 mg Oral Daily   docusate sodium 100 mg Oral Every Other Day   escitalopram 20 mg Oral Daily   melatonin 10 mg Oral Nightly   pantoprazole 40 mg Oral QAM   QUEtiapine  mg Oral Nightly   sodium chloride 3 mL Intravenous Q12H   tamsulosin 0.4 mg Oral Nightly   vancomycin 1,000 mg Intravenous Q12H     Physical Exam   Constitutional: He appears well-developed and well-nourished.   HENT:   Head: Normocephalic and atraumatic.   Eyes: EOM are normal. Pupils are equal, round, and reactive to light.   Cardiovascular: Normal rate and regular rhythm.   Pulmonary/Chest: Effort normal and breath sounds normal.   Abdominal: Soft. Bowel sounds are normal.   Musculoskeletal: He exhibits no edema.   Neurological: He is alert.   Oriented to self   Skin: Skin is warm and dry.   Psychiatric:   Calm   Vitals reviewed.    LAB/DIAGNOSTICS:    Lab Results (last 24 hours)     Procedure Component Value Units Date/Time    Urine Culture - Urine, Urine, Clean Catch [[id number]]  (Abnormal)  (Susceptibility) Collected:  01/01/19 1503    Specimen:  Urine, Clean Catch Updated:  01/04/19 2880     Urine Culture >100,000 CFU/mL Enterococcus faecalis    Susceptibility      Enterococcus faecalis     JUDD     Ampicillin Susceptible     Ciprofloxacin Resistant     Gentamicin High Level Synergy Resistant     Levofloxacin Resistant     Nitrofurantoin " Susceptible     Tetracycline Resistant     Vancomycin Susceptible                    Basic Metabolic Panel [[id number]]  (Abnormal) Collected:  01/04/19 0334    Specimen:  Blood Updated:  01/04/19 0449     Glucose 115 mg/dL      BUN 18 mg/dL      Creatinine 1.19 mg/dL      Sodium 134 mmol/L      Potassium 3.4 mmol/L      Chloride 102 mmol/L      CO2 25.2 mmol/L      Calcium 8.2 mg/dL      eGFR Non African Amer 62 mL/min/1.73      BUN/Creatinine Ratio 15.1     Anion Gap 6.8 mmol/L     Narrative:       GFR Normal >60  Chronic Kidney Disease <60  Kidney Failure <15    CBC & Differential [[id number]] Collected:  01/04/19 0334    Specimen:  Blood Updated:  01/04/19 0425    Narrative:       The following orders were created for panel order CBC & Differential.  Procedure                               Abnormality         Status                     ---------                               -----------         ------                     CBC Auto Differential[[id number]]        Abnormal            Final result                 Please view results for these tests on the individual orders.    CBC Auto Differential [[id number]]  (Abnormal) Collected:  01/04/19 0334    Specimen:  Blood Updated:  01/04/19 0425     WBC 7.38 10*3/mm3      RBC 3.54 10*6/mm3      Hemoglobin 9.9 g/dL      Hematocrit 30.6 %      MCV 86.4 fL      MCH 28.0 pg      MCHC 32.4 g/dL      RDW 13.2 %      RDW-SD 41.3 fl      MPV 8.6 fL      Platelets 158 10*3/mm3      Neutrophil % 78.3 %      Lymphocyte % 9.9 %      Monocyte % 10.7 %      Eosinophil % 0.5 %      Basophil % 0.3 %      Immature Grans % 0.3 %      Neutrophils, Absolute 5.78 10*3/mm3      Lymphocytes, Absolute 0.73 10*3/mm3      Monocytes, Absolute 0.79 10*3/mm3      Eosinophils, Absolute 0.04 10*3/mm3      Basophils, Absolute 0.02 10*3/mm3      Immature Grans, Absolute 0.02 10*3/mm3      nRBC 0.0 /100 WBC     Blood Culture - Blood, Hand, Right [[id number]] Collected:  01/01/19 3780    Specimen:  Blood from  Hand, Right Updated:  01/04/19 0100     Blood Culture No growth at 2 days    Blood Culture - Blood, Arm, Left [[id number]] Collected:  01/01/19 0010    Specimen:  Blood from Arm, Left Updated:  01/04/19 0100     Blood Culture No growth at 2 days        No orders to display        No radiology results for the last day    ASSESSMENT/PLAN:  Sepsis secondary to acute Enterococcus UTI/pyelonephritis secondary to recent instrumentation secondary to nephrostomy tube placement 12/31/18 at [facility] secondary to nephrolithiasis:   Lactic acidosis secondary to sepsis: urology following  Change to Vancomycin per C&S   Add probiotic  RX sent to pharmacy for Zyvox approval when ready for discharge     Recent fall, likely secondary to sepsis: no acute fracture identified     Hypertension with hypotension:   BP at goal, continue to hold home meds, give IVFs  Monitor    Hypokalemia: check magnesium, add replacement protocol     CKD 3: baseline creatinine approx 1.2   Creatinine on admit 1.41, now 1.19     Acute post operative anemia: Most recent hemoglobin 12.0 at [facility] on 12/31/2018, currently hemoglobin 9.9, likely some dilutional effect  No active blood loss noted     Mental retardation: lives at [facility], caregiver at bedside    PLAN FOR DISPOSITION: [facility] on zyvox when able    EMMA Chandler  Hospitalist, [facility]  01/01/2019  1:22 PM

## 2019-01-05 VITALS
SYSTOLIC BLOOD PRESSURE: 117 MMHG | OXYGEN SATURATION: 98 % | BODY MASS INDEX: 24.89 KG/M2 | HEART RATE: 81 BPM | HEIGHT: 65 IN | WEIGHT: 149.38 LBS | RESPIRATION RATE: 18 BRPM | DIASTOLIC BLOOD PRESSURE: 69 MMHG | TEMPERATURE: 98 F

## 2019-01-05 LAB
ANION GAP SERPL CALCULATED.3IONS-SCNC: 6.6 MMOL/L
BUN BLD-MCNC: 16 MG/DL (ref 8–23)
BUN/CREAT SERPL: 12.9 (ref 7–25)
CALCIUM SPEC-SCNC: 8.2 MG/DL (ref 8.8–10.5)
CHLORIDE SERPL-SCNC: 105 MMOL/L (ref 98–107)
CO2 SERPL-SCNC: 26.4 MMOL/L (ref 22–29)
CREAT BLD-MCNC: 1.24 MG/DL (ref 0.76–1.27)
GFR SERPL CREATININE-BSD FRML MDRD: 59 ML/MIN/1.73
GLUCOSE BLD-MCNC: 96 MG/DL (ref 65–99)
MAGNESIUM SERPL-MCNC: 2.1 MG/DL (ref 1.7–2.5)
POTASSIUM BLD-SCNC: 4.7 MMOL/L (ref 3.5–5.2)
SODIUM BLD-SCNC: 138 MMOL/L (ref 136–145)

## 2019-01-05 PROCEDURE — 83735 ASSAY OF MAGNESIUM: CPT | Performed by: FAMILY MEDICINE

## 2019-01-05 PROCEDURE — 80048 BASIC METABOLIC PNL TOTAL CA: CPT | Performed by: INTERNAL MEDICINE

## 2019-01-05 PROCEDURE — 25010000002 VANCOMYCIN 1 G RECONSTITUTED SOLUTION 1 EACH VIAL

## 2019-01-05 PROCEDURE — 99238 HOSP IP/OBS DSCHRG MGMT 30/<: CPT | Performed by: HOSPITALIST

## 2019-01-05 RX ORDER — SACCHAROMYCES BOULARDII 250 MG
250 CAPSULE ORAL 2 TIMES DAILY
Qty: 10 CAPSULE | Refills: 0 | Status: ON HOLD | OUTPATIENT
Start: 2019-01-05 | End: 2020-11-14

## 2019-01-05 RX ADMIN — ESCITALOPRAM OXALATE 20 MG: 10 TABLET, FILM COATED ORAL at 10:09

## 2019-01-05 RX ADMIN — SODIUM CHLORIDE, POTASSIUM CHLORIDE, SODIUM LACTATE AND CALCIUM CHLORIDE 75 ML/HR: 600; 310; 30; 20 INJECTION, SOLUTION INTRAVENOUS at 10:15

## 2019-01-05 RX ADMIN — CETIRIZINE HYDROCHLORIDE 10 MG: 10 TABLET, FILM COATED ORAL at 10:10

## 2019-01-05 RX ADMIN — SODIUM CHLORIDE 1000 MG: 900 INJECTION, SOLUTION INTRAVENOUS at 02:26

## 2019-01-05 RX ADMIN — SODIUM CHLORIDE, PRESERVATIVE FREE 3 ML: 5 INJECTION INTRAVENOUS at 10:10

## 2019-01-05 RX ADMIN — PANTOPRAZOLE SODIUM 40 MG: 40 TABLET, DELAYED RELEASE ORAL at 06:06

## 2019-01-05 RX ADMIN — Medication 250 MG: at 10:09

## 2019-01-05 NOTE — NURSING NOTE
"Continued Stay Note  JOANN Nelson     Patient Name: Arian Persaud  MRN: [id number]  Today's Date: 1/5/2019    Admit Date: 1/1/2019    Discharge Plan     Row Name 01/05/19 1259       Plan    Plan Comments  spoke with Miranda pharmacist @ [facility] and the billing dept has ran a test claim and it went through as \"paid\". she is not able to make a stat run to deliver medication until patient is at the home. they do make a regular run later this evening so patient can have a bedtime dose tonight. updated MD & also on call  at [facility]. will continue to follow.         Discharge Codes    No documentation.             Alexia Perla RN    "

## 2019-01-05 NOTE — DISCHARGE SUMMARY
Arian Persaud  1955  [id number]        Hospitalists Discharge Summary    Date of Admission: 1/1/2019  Date of Discharge:  1/5/2019    Primary Discharge Diagnoses:    1. Enterococcus Sepsis  2.  Hypotension  3.  Hypokalemia    Secondary Discharge Diagnoses:    1.  CKD III  2.  Cognitive impairment    PCP  Patient Care Team:  Hyacinth Elizabeth MD as PCP - General (Internal Medicine)    Consults:   Consults     Date and Time Order Name Status Description    1/2/2019 0335 Inpatient Urology Consult Completed           Operations and Procedures Performed:       Ct Abdomen Pelvis With & Without Contrast    Result Date: 1/2/2019  Narrative: CT SCAN OF THE ABDOMEN AND PELVIS WITH AND WITHOUT CONTRAST 1/2/2019  HISTORY: Fever and abdominal pain today status post recent ureteral stent placement on 12/31/2018. Urinary tract infection and sepsis. Evaluate for hydronephrosis.  TECHNIQUE: Spiral CT was performed through the abdomen and pelvis without oral or intravenous contrast administration. Spiral CT was then performed through the abdomen and pelvis following intravenous contrast administration. Radiation dose reduction techniques included automated exposure control or exposure modulation based on body size. Radiation audit for CT and nuclear cardiology exams in the last 12 months: 0.  ABDOMEN FINDINGS: Recently placed left ureteral stent is in satisfactory position with the proximal end in the left renal collecting system and the distal end in the urinary bladder. Very minimal left hydronephrosis is noted. 2 previous left percutaneous nephrostomy tracks are noted. Cortical scarring left kidney is noted. Minimal gas is seen in the left renal collecting system presumably related to recent instrumentation. Following contrast administration, there is patchy enhancement of the left kidney, likely indicating inflammation or infection. Clinical correlation is recommended. Noncontrast CT of the abdomen and pelvis  reveals nonobstructing stones bilaterally left greater than right the largest nonobstructing stone seen in the left kidney measuring 1.3 cm in diameter. No abscess is seen. Small cysts are present within the anterior right hepatic lobe. There calcified granulomas in the spleen. Spleen is minimally enlarged, measuring 13 cm in diameter. The pancreas and adrenal glands are normal. The gallbladder is not visualized. It is presumed surgically absent.  PELVIS FINDINGS: The gut, mesenteric and lynda structures are normal. There is no free fluid in the abdomen or pelvis. Atelectatic changes are seen at the lung bases.      Impression: 1. Recently placed left ureteral stent is in satisfactory position as detailed above. Mild left hydronephrosis is noted. 2. Patchy enhancement of the left kidney likely reflects inflammation or infection. Clinical correlation is recommended. 3. Nonobstructing renal stones bilaterally, left greater than right. 4. The gallbladder is not visualized. It is presumed surgically absent. 5. Small hepatic cysts. 6. Mild splenomegaly.  Initial stat report to the patient's nurse by Dr. Morgan at 5:45 AM on 1/2/2019.  This report was finalized on 1/2/2019 7:49 AM by Dr. Jose Diaz MD.      Xr Chest 2 View    Result Date: 1/2/2019  Narrative: CHEST 2 VIEWS 1/1/2019  HISTORY: Cough and shortness of breath for 2 days. Smoking history. COPD exacerbation.  FINDINGS: The heart is normal in size. There is poor inspiratory result with bibasilar atelectasis. The lungs are otherwise clear. There are no pleural effusions.      Impression: No active pulmonary disease.  This report was finalized on 1/2/2019 8:12 AM by Dr. Jose Diaz MD.      Xr Spine Lumbar 2 Or 3 View    Result Date: 1/2/2019  Narrative: LUMBAR SPINE 3 VIEWS 1/1/2019   HISTORY: Low back pain status post fall today at 8:00 PM. Fever.  FINDINGS: 3 views of the lumbar spine demonstrate no fracture. The posterior vertebral body line is intact  and there is no anterolisthesis or retrolisthesis. The disc spaces are normally maintained. Marginal osteophytes are seen throughout the lumbar spine and there is degenerative change involving the articular facets. Left ureteral stent is in place with the proximal end in the region of the lower pole the left kidney and the distal end in the region of the bladder. Multiple large left renal calcifications are noted.      Impression: 1. Mild degenerative change in the lumbar spine. No acute abnormality. 2. Multiple large left renal calcifications with left ureteral stent in place.  This report was finalized on 1/2/2019 7:59 AM by Dr. Jose Diaz MD.        Allergies:  is allergic to keflex [cephalexin].      Discharge Medications:     Discharge Medications      New Medications      Instructions Start Date   linezolid 600 MG tablet  Commonly known as:  ZYVOX   600 mg, Oral, 2 Times Daily      saccharomyces boulardii 250 MG capsule  Commonly known as:  FLORASTOR   250 mg, Oral, 2 Times Daily         Continue These Medications      Instructions Start Date   acetaminophen 325 MG tablet  Commonly known as:  TYLENOL   650 mg, Oral, Every 6 Hours PRN      calcium carbonate 600 MG tablet  Commonly known as:  OS-JOSHUA   600 mg, Oral, Daily, WITH 400 UNITS OF VIT D       docusate sodium 100 MG capsule  Commonly known as:  COLACE   100 mg, Oral, Every Other Day      donepezil 5 MG tablet  Commonly known as:  ARICEPT   5 mg, Oral      ibuprofen 800 MG tablet  Commonly known as:  ADVIL,MOTRIN   800 mg, Oral, Every 8 Hours PRN      ketoconazole 2 % cream  Commonly known as:  NIZORAL   Topical, Daily      loratadine 10 MG tablet  Commonly known as:  CLARITIN   10 mg, Oral, Daily      memantine 5 MG tablet  Commonly known as:  NAMENDA   5 mg, Oral, 2 Times Daily      mineral oil 55 % oil oral liquid   30 mL, Oral, Once      omeprazole 40 MG capsule  Commonly known as:  priLOSEC   40 mg, Oral, Daily      polyethylene glycol  packet  Commonly known as:  MIRALAX   17 g, Oral, Daily      QUEtiapine  MG 24 hr tablet  Commonly known as:  SEROquel XR   150 mg, Oral, Nightly      tamsulosin 0.4 MG capsule 24 hr capsule  Commonly known as:  FLOMAX   1 capsule, Oral, Nightly         Stop These Medications    escitalopram 20 MG tablet  Commonly known as:  LEXAPRO     losartan 50 MG tablet  Commonly known as:  COZAAR     trimethoprim 100 MG tablet  Commonly known as:  TRIMPEX            History of Present Illness (taken from H&P):  62 yo WM w/ PMH of Chronic Left Nephrolithiasis, CAD, HTN, Mental Delays w/ dementia (Resident of [facility]), who was seen at [facility] yesterday (12/31) for an otpt placement of a Double J Ureteral Stent with removal of a left indwelling percutaneous nephrostomy tube by Jace Gatica of . Got PPX Levaquin prior to the procedure. WBC was 6.9, /97, , and T 97.6 at time of procedure.      Pt brought into [facility] because he fell out of bed. Speaking to Staff caregiver at bedside, he was his usual self, did not notice any difference in pt's mental status. Baseline, AOx1 (Knows self, can state who the president is, and the day, but doesn't know place or year). Caregiver states pt is a poor historian, under reports pain, and confabulates symptoms, especially to women.      He was on abx prior to getting his nephrostomy tube and after. However, was not sent home with abx after his stent placement yesterday.  Though discharge Documentation from [place] indicate both Cipro and Bactrium on his discharge med list. Unclear if these were drugs listed from prior admissions.      History limited due to Baseline and acute MS, history obtained from chart review and speaking with care giver from [facility].      Hospital Course  -Mr. Persaud was admitted to the Med/Surg unit and treated with broad-spectrum antibiotics.  He was seen in consultation by urology.  No further intervention planned.  Previous  culture data grew proteus ([place]), but our culture grew Enterococcus.  Blood cultures remained negative so he was transitioned to oral Zyvox  His home medicine regimen was adjusted to mitigate the risks of serotonin syndrome.  His blood pressure remained at goal off of his home regimen which I continued to hold at discharge.     Last Lab Results:   Lab Results (most recent)     Procedure Component Value Units Date/Time    Magnesium [[id number]]  (Normal) Collected:  01/05/19 0502    Specimen:  Blood Updated:  01/05/19 0724     Magnesium 2.1 mg/dL     Basic Metabolic Panel [[id number]]  (Abnormal) Collected:  01/05/19 0502    Specimen:  Blood Updated:  01/05/19 0533     Glucose 96 mg/dL      BUN 16 mg/dL      Creatinine 1.24 mg/dL      Sodium 138 mmol/L      Potassium 4.7 mmol/L      Chloride 105 mmol/L      CO2 26.4 mmol/L      Calcium 8.2 mg/dL      eGFR Non African Amer 59 mL/min/1.73      BUN/Creatinine Ratio 12.9     Anion Gap 6.6 mmol/L     Narrative:       GFR Normal >60  Chronic Kidney Disease <60  Kidney Failure <15    Blood Culture - Blood, Hand, Right [[id number]] Collected:  01/01/19 2300    Specimen:  Blood from Hand, Right Updated:  01/05/19 0101     Blood Culture No growth at 3 days    Blood Culture - Blood, Arm, Left [[id number]] Collected:  01/01/19 0010    Specimen:  Blood from Arm, Left Updated:  01/05/19 0101     Blood Culture No growth at 3 days    Magnesium [[id number]]  (Normal) Collected:  01/04/19 0334    Specimen:  Blood Updated:  01/04/19 1530     Magnesium 1.7 mg/dL     Urine Culture - Urine, Urine, Clean Catch [[id number]]  (Abnormal)  (Susceptibility) Collected:  01/01/19 2303    Specimen:  Urine, Clean Catch Updated:  01/04/19 0759     Urine Culture >100,000 CFU/mL Enterococcus faecalis    Susceptibility      Enterococcus faecalis     JUDD     Ampicillin Susceptible     Ciprofloxacin Resistant     Gentamicin High Level Synergy Resistant     Levofloxacin Resistant     Nitrofurantoin  Susceptible     Tetracycline Resistant     Vancomycin Susceptible                    Basic Metabolic Panel [[id number]]  (Abnormal) Collected:  01/04/19 0334    Specimen:  Blood Updated:  01/04/19 0449     Glucose 115 mg/dL      BUN 18 mg/dL      Creatinine 1.19 mg/dL      Sodium 134 mmol/L      Potassium 3.4 mmol/L      Chloride 102 mmol/L      CO2 25.2 mmol/L      Calcium 8.2 mg/dL      eGFR Non African Amer 62 mL/min/1.73      BUN/Creatinine Ratio 15.1     Anion Gap 6.8 mmol/L     Narrative:       GFR Normal >60  Chronic Kidney Disease <60  Kidney Failure <15    CBC & Differential [[id number]] Collected:  01/04/19 0334    Specimen:  Blood Updated:  01/04/19 0425    Narrative:       The following orders were created for panel order CBC & Differential.  Procedure                               Abnormality         Status                     ---------                               -----------         ------                     CBC Auto Differential[[id number]]        Abnormal            Final result                 Please view results for these tests on the individual orders.    CBC Auto Differential [[id number]]  (Abnormal) Collected:  01/04/19 0334    Specimen:  Blood Updated:  01/04/19 0425     WBC 7.38 10*3/mm3      RBC 3.54 10*6/mm3      Hemoglobin 9.9 g/dL      Hematocrit 30.6 %      MCV 86.4 fL      MCH 28.0 pg      MCHC 32.4 g/dL      RDW 13.2 %      RDW-SD 41.3 fl      MPV 8.6 fL      Platelets 158 10*3/mm3      Neutrophil % 78.3 %      Lymphocyte % 9.9 %      Monocyte % 10.7 %      Eosinophil % 0.5 %      Basophil % 0.3 %      Immature Grans % 0.3 %      Neutrophils, Absolute 5.78 10*3/mm3      Lymphocytes, Absolute 0.73 10*3/mm3      Monocytes, Absolute 0.79 10*3/mm3      Eosinophils, Absolute 0.04 10*3/mm3      Basophils, Absolute 0.02 10*3/mm3      Immature Grans, Absolute 0.02 10*3/mm3      nRBC 0.0 /100 WBC     Basic Metabolic Panel [[id number]]  (Abnormal) Collected:  01/03/19 0339    Specimen:  Blood  Updated:  01/03/19 0503     Glucose 102 mg/dL      BUN 22 mg/dL      Creatinine 1.39 mg/dL      Sodium 137 mmol/L      Potassium 4.1 mmol/L      Chloride 103 mmol/L      CO2 23.7 mmol/L      Calcium 8.2 mg/dL      eGFR Non African Amer 52 mL/min/1.73      BUN/Creatinine Ratio 15.8     Anion Gap 10.3 mmol/L     Narrative:       GFR Normal >60  Chronic Kidney Disease <60  Kidney Failure <15    Basic Metabolic Panel [[id number]]  (Abnormal) Collected:  01/02/19 0508    Specimen:  Blood Updated:  01/02/19 0700     Glucose 95 mg/dL      BUN 17 mg/dL      Creatinine 1.38 mg/dL      Sodium 135 mmol/L      Potassium 3.9 mmol/L      Chloride 103 mmol/L      CO2 22.9 mmol/L      Calcium 8.1 mg/dL      eGFR Non African Amer 52 mL/min/1.73      BUN/Creatinine Ratio 12.3     Anion Gap 9.1 mmol/L     Narrative:       GFR Normal >60  Chronic Kidney Disease <60  Kidney Failure <15    CBC Auto Differential [[id number]]  (Abnormal) Collected:  01/02/19 0508    Specimen:  Blood Updated:  01/02/19 0640     WBC 7.74 10*3/mm3      RBC 3.49 10*6/mm3      Hemoglobin 9.9 g/dL      Hematocrit 30.6 %      MCV 87.7 fL      MCH 28.4 pg      MCHC 32.4 g/dL      RDW 13.4 %      RDW-SD 42.5 fl      MPV 8.1 fL      Platelets 222 10*3/mm3      Neutrophil % 86.1 %      Lymphocyte % 4.5 %      Monocyte % 8.5 %      Eosinophil % 0.4 %      Basophil % 0.1 %      Immature Grans % 0.4 %      Neutrophils, Absolute 6.66 10*3/mm3      Lymphocytes, Absolute 0.35 10*3/mm3      Monocytes, Absolute 0.66 10*3/mm3      Eosinophils, Absolute 0.03 10*3/mm3      Basophils, Absolute 0.01 10*3/mm3      Immature Grans, Absolute 0.03 10*3/mm3      nRBC 0.0 /100 WBC     Lactic Acid, Reflex [[id number]]  (Normal) Collected:  01/02/19 0317    Specimen:  Blood Updated:  01/02/19 0335     Lactate 1.7 mmol/L     Lactic Acid, Reflex Timer (This will reflex a repeat order 3-3:15 hours after ordered.) [[id number]] Collected:  01/01/19 2303    Specimen:  Blood Updated:  01/02/19 0248      Extra Tube Hold for add-ons.     Comment: Auto resulted.       Comprehensive Metabolic Panel [[id number]]  (Abnormal) Collected:  01/01/19 2303    Specimen:  Blood Updated:  01/01/19 2354     Glucose 132 mg/dL      BUN 17 mg/dL      Creatinine 1.41 mg/dL      Sodium 134 mmol/L      Potassium 4.4 mmol/L      Chloride 98 mmol/L      CO2 22.1 mmol/L      Calcium 9.4 mg/dL      Total Protein 7.2 g/dL      Albumin 3.90 g/dL      ALT (SGPT) 8 U/L      AST (SGOT) 16 U/L      Alkaline Phosphatase 96 U/L      Total Bilirubin 0.3 mg/dL      eGFR Non African Amer 51 mL/min/1.73      Globulin 3.3 gm/dL      A/G Ratio 1.2 g/dL      BUN/Creatinine Ratio 12.1     Anion Gap 13.9 mmol/L     Procalcitonin [[id number]]  (Abnormal) Collected:  01/01/19 2303    Specimen:  Blood Updated:  01/01/19 2345     Procalcitonin 0.09 ng/mL     Narrative:       As a Marker for Sepsis (Non-Neonates):   1. <0.5 ng/mL represents a low risk of severe sepsis and/or septic shock.  2. >2 ng/mL represents a high risk of severe sepsis and/or septic shock.    As a Marker for Lower Respiratory Tract Infections that require antibiotic therapy:    PCT on Admission     Antibiotic Therapy       6-12 Hrs later  > 0.5                Strongly Recommended             >0.25 - <0.5         Recommended  0.1 - 0.25           Discouraged              Remeasure/reassess PCT  <0.1                 Strongly Discouraged     Remeasure/reassess PCT                     PCT values of < 0.5 ng/mL do not exclude an infection, because localized infections (without systemic signs) may be associated with such low concentrations, or a systemic infection in its initial stages (< 6 hours). Furthermore, increased PCT can occur without infection. PCT concentrations between 0.5 and 2.0 ng/mL should be interpreted taking into account the patient's history. It is recommended to retest PCT within 6-24 hours if any concentrations < 2 ng/mL are obtained.    Lactic Acid, Plasma [[id number]]   (Abnormal) Collected:  01/01/19 2303    Specimen:  Blood Updated:  01/01/19 2344     Lactate 2.3 mmol/L     Influenza Antigen, Rapid - Swab, Nasopharynx [[id number]]  (Normal) Collected:  01/01/19 2303    Specimen:  Swab from Nasopharynx Updated:  01/01/19 2331     Influenza A Ag, EIA Negative     Influenza B Ag, EIA Negative    Urinalysis, Microscopic Only - Urine, Catheter [[id number]]  (Abnormal) Collected:  01/01/19 2303    Specimen:  Urine, Catheter Updated:  01/01/19 2331     RBC, UA 6-12 /HPF      WBC, UA 3-5 /HPF      Bacteria, UA 2+ /HPF      Squamous Epithelial Cells, UA None Seen /HPF      Hyaline Casts, UA None Seen /LPF      Methodology Manual Light Microscopy    Urinalysis With Microscopic If Indicated (No Culture) - Urine, Catheter [[id number]]  (Abnormal) Collected:  01/01/19 2303    Specimen:  Urine, Catheter Updated:  01/01/19 2329     Color, UA Yellow     Appearance, UA Slightly Cloudy     pH, UA 6.5     Specific Gravity, UA 1.025     Glucose, UA Negative     Ketones, UA Negative     Bilirubin, UA Negative     Blood, UA Large (3+)     Protein, UA >=300 mg/dL (3+)     Leuk Esterase, UA Moderate (2+)     Nitrite, UA Negative     Urobilinogen, UA 0.2 E.U./dL    CBC & Differential [[id number]] Collected:  01/01/19 2303    Specimen:  Blood Updated:  01/01/19 2319    Narrative:       The following orders were created for panel order CBC & Differential.  Procedure                               Abnormality         Status                     ---------                               -----------         ------                     CBC Auto Differential[[id number]]        Abnormal            Final result                 Please view results for these tests on the individual orders.    CBC Auto Differential [[id number]]  (Abnormal) Collected:  01/01/19 2303    Specimen:  Blood Updated:  01/01/19 2319     WBC 9.71 10*3/mm3      RBC 4.44 10*6/mm3      Hemoglobin 12.4 g/dL      Hematocrit 38.5 %      MCV 86.7 fL      MCH  27.9 pg      MCHC 32.2 g/dL      RDW 13.2 %      RDW-SD 41.6 fl      MPV 8.1 fL      Platelets 265 10*3/mm3      Neutrophil % 87.3 %      Lymphocyte % 5.1 %      Monocyte % 6.9 %      Eosinophil % 0.2 %      Basophil % 0.3 %      Immature Grans % 0.2 %      Neutrophils, Absolute 8.47 10*3/mm3      Lymphocytes, Absolute 0.50 10*3/mm3      Monocytes, Absolute 0.67 10*3/mm3      Eosinophils, Absolute 0.02 10*3/mm3      Basophils, Absolute 0.03 10*3/mm3      Immature Grans, Absolute 0.02 10*3/mm3      nRBC 0.0 /100 WBC         Imaging Results (most recent)     Procedure Component Value Units Date/Time    XR Chest 2 View [[id number]] Collected:  01/02/19 0811     Updated:  01/02/19 0814    Narrative:       CHEST 2 VIEWS 1/1/2019     HISTORY:   Cough and shortness of breath for 2 days. Smoking history. COPD  exacerbation.     FINDINGS:  The heart is normal in size. There is poor inspiratory result with  bibasilar atelectasis. The lungs are otherwise clear. There are no  pleural effusions.       Impression:       No active pulmonary disease.     This report was finalized on 1/2/2019 8:12 AM by Dr. Jose Diaz MD.       XR Spine Lumbar 2 or 3 View [[id number]] Collected:  01/02/19 0758     Updated:  01/02/19 0801    Narrative:       LUMBAR SPINE 3 VIEWS 1/1/2019        HISTORY:   Low back pain status post fall today at 8:00 PM. Fever.     FINDINGS:  3 views of the lumbar spine demonstrate no fracture. The posterior  vertebral body line is intact and there is no anterolisthesis or  retrolisthesis. The disc spaces are normally maintained. Marginal  osteophytes are seen throughout the lumbar spine and there is  degenerative change involving the articular facets. Left ureteral stent  is in place with the proximal end in the region of the lower pole the  left kidney and the distal end in the region of the bladder. Multiple  large left renal calcifications are noted.       Impression:       1. Mild degenerative change in the  lumbar spine. No acute abnormality.  2. Multiple large left renal calcifications with left ureteral stent in  place.     This report was finalized on 1/2/2019 7:59 AM by Dr. Jose Diaz MD.       CT Abdomen Pelvis With & Without Contrast [[id number]] Collected:  01/02/19 0741     Updated:  01/02/19 0751    Narrative:       CT SCAN OF THE ABDOMEN AND PELVIS WITH AND WITHOUT CONTRAST 1/2/2019     HISTORY:  Fever and abdominal pain today status post recent ureteral stent  placement on 12/31/2018. Urinary tract infection and sepsis. Evaluate  for hydronephrosis.     TECHNIQUE:  Spiral CT was performed through the abdomen and pelvis without oral or  intravenous contrast administration. Spiral CT was then performed  through the abdomen and pelvis following intravenous contrast  administration. Radiation dose reduction techniques included automated  exposure control or exposure modulation based on body size. Radiation  audit for CT and nuclear cardiology exams in the last 12 months: 0.     ABDOMEN FINDINGS:  Recently placed left ureteral stent is in satisfactory position with the  proximal end in the left renal collecting system and the distal end in  the urinary bladder. Very minimal left hydronephrosis is noted. 2  previous left percutaneous nephrostomy tracks are noted. Cortical  scarring left kidney is noted. Minimal gas is seen in the left renal  collecting system presumably related to recent instrumentation.  Following contrast administration, there is patchy enhancement of the  left kidney, likely indicating inflammation or infection. Clinical  correlation is recommended. Noncontrast CT of the abdomen and pelvis  reveals nonobstructing stones bilaterally left greater than right the  largest nonobstructing stone seen in the left kidney measuring 1.3 cm in  diameter. No abscess is seen. Small cysts are present within the  anterior right hepatic lobe. There calcified granulomas in the spleen.  Spleen is minimally  enlarged, measuring 13 cm in diameter. The pancreas  and adrenal glands are normal. The gallbladder is not visualized. It is  presumed surgically absent.     PELVIS FINDINGS:  The gut, mesenteric and lynda structures are normal. There is no free  fluid in the abdomen or pelvis. Atelectatic changes are seen at the lung  bases.       Impression:       1. Recently placed left ureteral stent is in satisfactory position as  detailed above. Mild left hydronephrosis is noted.  2. Patchy enhancement of the left kidney likely reflects inflammation or  infection. Clinical correlation is recommended.  3. Nonobstructing renal stones bilaterally, left greater than right.  4. The gallbladder is not visualized. It is presumed surgically absent.  5. Small hepatic cysts.  6. Mild splenomegaly.     Initial stat report to the patient's nurse by Dr. Morgan at 5:45 AM on  1/2/2019.     This report was finalized on 1/2/2019 7:49 AM by Dr. Jose Diaz MD.             PROCEDURES      Condition on Discharge: Stable    Physical Exam at Discharge  Vital Signs  Temp:  [97.4 °F (36.3 °C)-98.6 °F (37 °C)] 98 °F (36.7 °C)  Heart Rate:  [76-82] 81  Resp:  [18-20] 18  BP: (107-141)/(63-79) 117/69    Physical Exam:  Physical Exam   Constitutional: Patient appears well-developed and well-nourished and in no acute distress   Cardiovascular: Regular rate, regular rhythm, S1 normal and S2 normal.  Exam reveals no gallop and no friction rub.  No murmur heard.  Pulmonary/Chest: Lungs are diminished to auscultation bilaterally. No respiratory distress. No wheezes. No rhonchi. No rales.   Abdominal: Soft. Bowel sounds are normal. There is no tenderness.   Musculoskeletal: Normal Muscle tone  Extremities: No edema.  Neurological: Cranial nerves II-XII are grossly intact with no focal deficits.    Discharge Disposition  Return to [facility]    Visiting Nurse:    No     Home PT/OT:  No     Home Safety Evaluation:  No     DME  None    Discharge Diet:       Dietary Orders (From admission, onward)    Start     Ordered    01/03/19 0739  Diet Regular  Diet Effective Now     Question:  Diet Texture / Consistency  Answer:  Regular    01/03/19 0738          Activity at Discharge:  As tolerated      Follow-up Appointments  No future appointments.      Test Results Pending at Discharge   Order Current Status    Blood Culture - Blood, Arm, Left Preliminary result    Blood Culture - Blood, Hand, Right Preliminary result           Arya Bland MD  01/05/19  1:56 PM

## 2019-01-05 NOTE — NURSING NOTE
Case Management Discharge Note    Final Note: discharged home to [facility].     Destination      No service has been selected for the patient.      Durable Medical Equipment      No service has been selected for the patient.      Dialysis/Infusion      No service has been selected for the patient.      Home Medical Care      No service has been selected for the patient.      Community Resources      No service has been selected for the patient.             Final Discharge Disposition Code: 01 - home or self-care

## 2019-01-05 NOTE — PLAN OF CARE
Problem: Patient Care Overview  Goal: Plan of Care Review  Outcome: Ongoing (interventions implemented as appropriate)   01/05/19 0446   Coping/Psychosocial   Plan of Care Reviewed With patient;guardian   Plan of Care Review   Progress no change   OTHER   Outcome Summary Pt. is A&Ox2 this shift; IV abx continued; IVF infusing; K+ replaced tonight; no falls this shift - fall precautions in place       Problem: Fall Risk (Adult)  Goal: Identify Related Risk Factors and Signs and Symptoms  Outcome: Ongoing (interventions implemented as appropriate)   01/05/19 0446   Fall Risk (Adult)   Related Risk Factors (Fall Risk) gait/mobility problems   Signs and Symptoms (Fall Risk) presence of risk factors     Goal: Absence of Fall  Outcome: Ongoing (interventions implemented as appropriate)   01/05/19 0446   Fall Risk (Adult)   Absence of Fall making progress toward outcome       Problem: Skin Injury Risk (Adult)  Goal: Identify Related Risk Factors and Signs and Symptoms  Outcome: Ongoing (interventions implemented as appropriate)   01/05/19 0446   Skin Injury Risk (Adult)   Related Risk Factors (Skin Injury Risk) cognitive impairment;mobility impaired     Goal: Skin Health and Integrity  Outcome: Ongoing (interventions implemented as appropriate)   01/05/19 0446   Skin Injury Risk (Adult)   Skin Health and Integrity making progress toward outcome

## 2019-01-05 NOTE — PLAN OF CARE
Problem: Fall Risk (Adult)  Goal: Absence of Fall  Outcome: Ongoing (interventions implemented as appropriate)   01/05/19 1350   Fall Risk (Adult)   Absence of Fall making progress toward outcome   Patient turned and position frequently, and check for incontinency.  Cream (zinc) applied to buttock.

## 2019-01-07 LAB
BACTERIA SPEC AEROBE CULT: NORMAL
BACTERIA SPEC AEROBE CULT: NORMAL

## 2020-07-10 ENCOUNTER — HOSPITAL ENCOUNTER (EMERGENCY)
Facility: HOSPITAL | Age: 65
Discharge: NURSING, REHAB OR LONG-TERM CARE | End: 2020-07-10
Attending: EMERGENCY MEDICINE | Admitting: EMERGENCY MEDICINE

## 2020-07-10 VITALS
HEART RATE: 84 BPM | SYSTOLIC BLOOD PRESSURE: 115 MMHG | OXYGEN SATURATION: 95 % | RESPIRATION RATE: 18 BRPM | DIASTOLIC BLOOD PRESSURE: 68 MMHG | BODY MASS INDEX: 20.78 KG/M2 | TEMPERATURE: 97.4 F | WEIGHT: 124.9 LBS

## 2020-07-10 DIAGNOSIS — R06.00 DYSPNEA, UNSPECIFIED TYPE: Primary | ICD-10-CM

## 2020-07-10 PROCEDURE — 99282 EMERGENCY DEPT VISIT SF MDM: CPT | Performed by: EMERGENCY MEDICINE

## 2020-07-10 PROCEDURE — 99283 EMERGENCY DEPT VISIT LOW MDM: CPT

## 2020-07-10 RX ORDER — LOSARTAN POTASSIUM 50 MG/1
50 TABLET ORAL DAILY
COMMUNITY
End: 2020-12-06 | Stop reason: HOSPADM

## 2020-07-10 RX ORDER — PRAVASTATIN SODIUM 10 MG
10 TABLET ORAL DAILY
COMMUNITY

## 2020-07-10 RX ORDER — MONTELUKAST SODIUM 10 MG/1
10 TABLET ORAL NIGHTLY
COMMUNITY
End: 2020-11-23 | Stop reason: HOSPADM

## 2020-07-10 RX ORDER — ESCITALOPRAM OXALATE 20 MG/1
20 TABLET ORAL DAILY
COMMUNITY

## 2020-07-10 RX ORDER — NITROFURANTOIN MACROCRYSTALS 100 MG/1
100 CAPSULE ORAL 2 TIMES DAILY
COMMUNITY
End: 2020-11-23 | Stop reason: HOSPADM

## 2020-07-10 RX ORDER — TRIMETHOPRIM 100 MG/1
100 TABLET ORAL DAILY
COMMUNITY
End: 2020-11-23 | Stop reason: HOSPADM

## 2020-07-10 RX ORDER — BACLOFEN 10 MG/1
5 TABLET ORAL 3 TIMES DAILY
COMMUNITY
End: 2020-11-23 | Stop reason: HOSPADM

## 2020-07-10 RX ORDER — ASPIRIN 81 MG/1
81 TABLET, CHEWABLE ORAL DAILY
COMMUNITY
End: 2020-11-14

## 2020-07-10 RX ORDER — MULTIPLE VITAMINS W/ MINERALS TAB 9MG-400MCG
1 TAB ORAL DAILY
COMMUNITY
End: 2020-12-06 | Stop reason: HOSPADM

## 2020-07-10 NOTE — ED NOTES
Sent from [employer].  Report received from EMS.  States that Patient appeared to be having SOA at home V/S as charted.  No distress noted.     Bonita Jose, RN  07/10/20 0137

## 2020-07-10 NOTE — ED PROVIDER NOTES
"Subjective     History provided by:  EMS personnel, nursing home and caregiver    History of Present Illness    · Chief complaint: \"Shortness of breath\"    · Location: At the [facility] assisted living [facility]    · Quality/Severity: The staff at the [facility] stated it looked like he was having trouble breathing.  They did not have a pulse oximetry.    · Timing/Onset: They noticed tonight while he was sleeping.    · Modifying Factors: The patient is currently taking baclofen for neck spasms.    · Associated symptoms: None.    · Narrative: The patient is a 64-year-old white male who is a resident at the [facility] assisted living [facility].  He was seen at [facility] emergency department as well as by his PCP Dr. Rivers yesterday and found to have a UTI for which she was prescribed Macrobid and muscle spasms in the neck for which he was prescribed baclofen.  Tonight while sleeping the nursing staff thought that he was having trouble breathing \"missing a breath\" intermittently.  He did not have a pulse oximetry, so EMS was called.  The patient had a normal pulse oximetry by EMS, but staff elected to send him on for further evaluation in the ER anyway.  The patient has a history of mental retardation and what verbiage he is able to make is non-comprehensible.  He is not able to elaborate on the history of the present illness.    Review of Systems   Reason unable to perform ROS: The patient has severe intellectual disability and is averbal.   Constitutional: Negative for fever.   Respiratory: Negative for cough and wheezing.    Gastrointestinal: Negative for diarrhea and vomiting.     Past Medical History:   Diagnosis Date   • Coronary artery disease    • Injury of back    • Kidney stones    • Mental retardation      /68   Pulse 84   Temp 97.4 °F (36.3 °C)   Resp 18   Wt 56.7 kg (124 lb 14.4 oz)   SpO2 95%   BMI 20.78 kg/m²     Past Medical History:   Diagnosis Date   • Coronary artery " disease    • Injury of back    • Kidney stones    • Mental retardation        Allergies   Allergen Reactions   • Keflex [Cephalexin]        Past Surgical History:   Procedure Laterality Date   • KIDNEY SURGERY     • TONSILLECTOMY         Family History   Family history unknown: Yes       Social History     Socioeconomic History   • Marital status: Single     Spouse name: Not on file   • Number of children: Not on file   • Years of education: Not on file   • Highest education level: Not on file   Tobacco Use   • Smoking status: Never Smoker   Substance and Sexual Activity   • Alcohol use: No   • Drug use: No   • Sexual activity: Defer           Objective   Physical Exam   Constitutional: He appears well-developed and well-nourished. No distress.   The patient appears in no acute distress and does not appear acutely ill.  Review of his vital signs: He is afebrile with a temperature 97.4, respirations are normal 18 with a normal oxygen saturation of 96% on room air, heart rate is normal 84, blood pressure normal 115/60.   HENT:   Head: Normocephalic and atraumatic.   Eyes: Pupils are equal, round, and reactive to light. Conjunctivae and EOM are normal. No scleral icterus.   Neck: Normal range of motion. Neck supple. No JVD present.   Cardiovascular: Normal rate and regular rhythm.   No murmur heard.  Pulmonary/Chest: Effort normal and breath sounds normal.   Abdominal: Soft. Bowel sounds are normal. He exhibits no distension. There is no tenderness.   Musculoskeletal: He exhibits no edema or deformity.   Lymphadenopathy:     He has no cervical adenopathy.   Neurological: He is alert. No cranial nerve deficit or sensory deficit. He exhibits normal muscle tone.   Skin: Skin is warm and dry. Capillary refill takes less than 2 seconds. No rash noted. He is not diaphoretic. No erythema. No pallor.   Nursing note and vitals reviewed.      Procedures           ED Course  ED Course as of Jul 10 0249   Fri Jul 10, 2020   0241  The patient appears in no acute distress and does not appear ill.  His lungs are clear to auscultation with good air movement.  His room air oxygen saturation wearing a surgical mask is 96%.  I can see no indication that the patient was ever hypoxic and no indication for further work-up.    [TP]      ED Course User Index  [TP] Joe Lee MD                                           MDM  Number of Diagnoses or Management Options  Dyspnea, unspecified type: new and does not require workup     Amount and/or Complexity of Data Reviewed  Obtain history from someone other than the patient: yes    Risk of Complications, Morbidity, and/or Mortality  Presenting problems: high  Diagnostic procedures: minimal  Management options: high  General comments: My differential diagnosis for dyspnea includes but is not limited to:  Asthma, COPD, pneumonia, pulmonary embolus, acute respiratory distress syndrome, pneumothorax, pleural effusion, pulmonary fibrosis, congestive heart failure, myocardial infarction, DKA, uremia, acidosis, sepsis, anemia, drug related, hyperventilation, CNS disease    Patient Progress  Patient progress: stable      Final diagnoses:   Dyspnea, unspecified type           Labs Reviewed - No data to display  No orders to display          Medication List      No changes were made to your prescriptions during this visit.            Joe Lee MD  07/10/20 0249

## 2020-07-23 NOTE — PROGRESS NOTES
"SERVICE: [facility] HOSPITALIST    CONSULTANTS: Urology    CHIEF COMPLAINT: Follow up sepsis secondary to acute UTI, status post post-nephrostomy tube placement 12/31/2018    SUBJECTIVE: The patient notes he is not having any pain however caregiver at bedside notes that patient would have to have 12/10 pain in order to complain.  She notes he is at his baseline for mentation and is able to walk with a walker at his a slime.  He and caregiver otherwise note no cough/soa/chest pain/n/v/d/abdominal pain or other new concerns.    OBJECTIVE:    /82 (BP Location: Left arm, Patient Position: Lying)   Pulse 106   Temp 99.4 °F (37.4 °C) (Oral)   Resp 14   Ht 165.1 cm (65\")   Wt 67.8 kg (149 lb 6 oz)   SpO2 99%   BMI 24.86 kg/m²     MEDS/LABS REVIEWED AND ORDERED    aztreonam      aztreonam 2 g Intravenous Once   cetirizine 10 mg Oral Daily   docusate sodium 100 mg Oral Every Other Day   escitalopram 20 mg Oral Daily   melatonin 10 mg Oral Nightly   meropenem      meropenem 1 g Intravenous Q8H   pantoprazole 40 mg Oral QAM   QUEtiapine  mg Oral Nightly   sodium chloride 3 mL Intravenous Q12H   Sodium chloride      tamsulosin 0.4 mg Oral Nightly   vancomycin      vancomycin 15 mg/kg Intravenous Q12H     Physical Exam   Constitutional: He appears well-developed and well-nourished.   HENT:   Head: Normocephalic and atraumatic.   Poor dentition   Eyes: EOM are normal. Pupils are equal, round, and reactive to light.   Cardiovascular: Regular rhythm.   Tachycardic   Pulmonary/Chest: Effort normal and breath sounds normal.   Abdominal: Soft. Bowel sounds are normal.   Musculoskeletal: He exhibits no edema.   Neurological: He is alert.   Oriented to self and place   Skin: Skin is warm and dry. No erythema.   Psychiatric: He has a normal mood and affect. His behavior is normal.   Vitals reviewed.    LAB/DIAGNOSTICS:    Lab Results (last 24 hours)     Procedure Component Value Units Date/Time    " Addended by: OLGA LANGSTON on: 7/23/2020 06:11 PM     Modules accepted: Orders     Urine Culture - Urine, Urine, Clean Catch [[id number]]  (Normal) Collected:  01/01/19 2303    Specimen:  Urine, Clean Catch Updated:  01/02/19 0959     Urine Culture Culture in progress    Basic Metabolic Panel [[id number]]  (Abnormal) Collected:  01/02/19 0508    Specimen:  Blood Updated:  01/02/19 0700     Glucose 95 mg/dL      BUN 17 mg/dL      Creatinine 1.38 mg/dL      Sodium 135 mmol/L      Potassium 3.9 mmol/L      Chloride 103 mmol/L      CO2 22.9 mmol/L      Calcium 8.1 mg/dL      eGFR Non African Amer 52 mL/min/1.73      BUN/Creatinine Ratio 12.3     Anion Gap 9.1 mmol/L     Narrative:       GFR Normal >60  Chronic Kidney Disease <60  Kidney Failure <15    CBC Auto Differential [[id number]]  (Abnormal) Collected:  01/02/19 0508    Specimen:  Blood Updated:  01/02/19 0640     WBC 7.74 10*3/mm3      RBC 3.49 10*6/mm3      Hemoglobin 9.9 g/dL      Hematocrit 30.6 %      MCV 87.7 fL      MCH 28.4 pg      MCHC 32.4 g/dL      RDW 13.4 %      RDW-SD 42.5 fl      MPV 8.1 fL      Platelets 222 10*3/mm3      Neutrophil % 86.1 %      Lymphocyte % 4.5 %      Monocyte % 8.5 %      Eosinophil % 0.4 %      Basophil % 0.1 %      Immature Grans % 0.4 %      Neutrophils, Absolute 6.66 10*3/mm3      Lymphocytes, Absolute 0.35 10*3/mm3      Monocytes, Absolute 0.66 10*3/mm3      Eosinophils, Absolute 0.03 10*3/mm3      Basophils, Absolute 0.01 10*3/mm3      Immature Grans, Absolute 0.03 10*3/mm3      nRBC 0.0 /100 WBC     Lactic Acid, Reflex [[id number]]  (Normal) Collected:  01/02/19 0317    Specimen:  Blood Updated:  01/02/19 0335     Lactate 1.7 mmol/L     Lactic Acid, Reflex Timer (This will reflex a repeat order 3-3:15 hours after ordered.) [[id number]] Collected:  01/01/19 2303    Specimen:  Blood Updated:  01/02/19 0245     Extra Tube Hold for add-ons.     Comment: Auto resulted.       Blood Culture - Blood, Hand, Right [[id number]] Collected:  01/01/19 2300    Specimen:  Blood from Hand, Right Updated:  01/02/19 0053     Blood Culture - Blood, Arm, Left [[id number]] Collected:  01/01/19 0010    Specimen:  Blood from Arm, Left Updated:  01/02/19 0052    Comprehensive Metabolic Panel [[id number]]  (Abnormal) Collected:  01/01/19 2303    Specimen:  Blood Updated:  01/01/19 2354     Glucose 132 mg/dL      BUN 17 mg/dL      Creatinine 1.41 mg/dL      Sodium 134 mmol/L      Potassium 4.4 mmol/L      Chloride 98 mmol/L      CO2 22.1 mmol/L      Calcium 9.4 mg/dL      Total Protein 7.2 g/dL      Albumin 3.90 g/dL      ALT (SGPT) 8 U/L      AST (SGOT) 16 U/L      Alkaline Phosphatase 96 U/L      Total Bilirubin 0.3 mg/dL      eGFR Non African Amer 51 mL/min/1.73      Globulin 3.3 gm/dL      A/G Ratio 1.2 g/dL      BUN/Creatinine Ratio 12.1     Anion Gap 13.9 mmol/L     Procalcitonin [[id number]]  (Abnormal) Collected:  01/01/19 2303    Specimen:  Blood Updated:  01/01/19 2345     Procalcitonin 0.09 ng/mL     Narrative:       As a Marker for Sepsis (Non-Neonates):   1. <0.5 ng/mL represents a low risk of severe sepsis and/or septic shock.  2. >2 ng/mL represents a high risk of severe sepsis and/or septic shock.    As a Marker for Lower Respiratory Tract Infections that require antibiotic therapy:    PCT on Admission     Antibiotic Therapy       6-12 Hrs later  > 0.5                Strongly Recommended             >0.25 - <0.5         Recommended  0.1 - 0.25           Discouraged              Remeasure/reassess PCT  <0.1                 Strongly Discouraged     Remeasure/reassess PCT                     PCT values of < 0.5 ng/mL do not exclude an infection, because localized infections (without systemic signs) may be associated with such low concentrations, or a systemic infection in its initial stages (< 6 hours). Furthermore, increased PCT can occur without infection. PCT concentrations between 0.5 and 2.0 ng/mL should be interpreted taking into account the patient's history. It is recommended to retest PCT within 6-24 hours if any  concentrations < 2 ng/mL are obtained.    Lactic Acid, Plasma [[id number]]  (Abnormal) Collected:  01/01/19 2303    Specimen:  Blood Updated:  01/01/19 2344     Lactate 2.3 mmol/L     Influenza Antigen, Rapid - Swab, Nasopharynx [[id number]]  (Normal) Collected:  01/01/19 2303    Specimen:  Swab from Nasopharynx Updated:  01/01/19 2331     Influenza A Ag, EIA Negative     Influenza B Ag, EIA Negative    Urinalysis, Microscopic Only - Urine, Catheter [[id number]]  (Abnormal) Collected:  01/01/19 2303    Specimen:  Urine, Catheter Updated:  01/01/19 2331     RBC, UA 6-12 /HPF      WBC, UA 3-5 /HPF      Bacteria, UA 2+ /HPF      Squamous Epithelial Cells, UA None Seen /HPF      Hyaline Casts, UA None Seen /LPF      Methodology Manual Light Microscopy    Urinalysis With Microscopic If Indicated (No Culture) - Urine, Catheter [[id number]]  (Abnormal) Collected:  01/01/19 2303    Specimen:  Urine, Catheter Updated:  01/01/19 2329     Color, UA Yellow     Appearance, UA Slightly Cloudy     pH, UA 6.5     Specific Gravity, UA 1.025     Glucose, UA Negative     Ketones, UA Negative     Bilirubin, UA Negative     Blood, UA Large (3+)     Protein, UA >=300 mg/dL (3+)     Leuk Esterase, UA Moderate (2+)     Nitrite, UA Negative     Urobilinogen, UA 0.2 E.U./dL    CBC & Differential [[id number]] Collected:  01/01/19 2303    Specimen:  Blood Updated:  01/01/19 2319    Narrative:       The following orders were created for panel order CBC & Differential.  Procedure                               Abnormality         Status                     ---------                               -----------         ------                     CBC Auto Differential[[id number]]        Abnormal            Final result                 Please view results for these tests on the individual orders.    CBC Auto Differential [[id number]]  (Abnormal) Collected:  01/01/19 2303    Specimen:  Blood Updated:  01/01/19 2319     WBC 9.71 10*3/mm3      RBC 4.44 10*6/mm3       Hemoglobin 12.4 g/dL      Hematocrit 38.5 %      MCV 86.7 fL      MCH 27.9 pg      MCHC 32.2 g/dL      RDW 13.2 %      RDW-SD 41.6 fl      MPV 8.1 fL      Platelets 265 10*3/mm3      Neutrophil % 87.3 %      Lymphocyte % 5.1 %      Monocyte % 6.9 %      Eosinophil % 0.2 %      Basophil % 0.3 %      Immature Grans % 0.2 %      Neutrophils, Absolute 8.47 10*3/mm3      Lymphocytes, Absolute 0.50 10*3/mm3      Monocytes, Absolute 0.67 10*3/mm3      Eosinophils, Absolute 0.02 10*3/mm3      Basophils, Absolute 0.03 10*3/mm3      Immature Grans, Absolute 0.02 10*3/mm3      nRBC 0.0 /100 WBC         No orders to display        Ct Abdomen Pelvis With & Without Contrast    Result Date: 1/2/2019  1. Recently placed left ureteral stent is in satisfactory position as detailed above. Mild left hydronephrosis is noted. 2. Patchy enhancement of the left kidney likely reflects inflammation or infection. Clinical correlation is recommended. 3. Nonobstructing renal stones bilaterally, left greater than right. 4. The gallbladder is not visualized. It is presumed surgically absent. 5. Small hepatic cysts. 6. Mild splenomegaly.  Initial stat report to the patient's nurse by Dr. Morgan at 5:45 AM on 1/2/2019.  This report was finalized on 1/2/2019 7:49 AM by Dr. Jose Diaz MD.      Xr Chest 2 View    Result Date: 1/2/2019  No active pulmonary disease.  This report was finalized on 1/2/2019 8:12 AM by Dr. Jose Diaz MD.      Xr Spine Lumbar 2 Or 3 View    Result Date: 1/2/2019  1. Mild degenerative change in the lumbar spine. No acute abnormality. 2. Multiple large left renal calcifications with left ureteral stent in place.  This report was finalized on 1/2/2019 7:59 AM by Dr. Jose Diaz MD.      ASSESSMENT/PLAN:  Sepsis secondary to acute UTI secondary to recent instrumentation secondary to nephrostomy tube placement 12/31/18 at [facility] secondary to nephrolithiasis:   Lactic acidosis secondary to sepsis: urology  consulted  Continue meropenem/Vanc  Fever curve trending down/WBCC now normal/Lactate normal  Allow patient to eat after urology evaluation to determine if any surgical intervention will be needed  IV hydration  Continue flomax    Recent fall, likely secondary to sepsis: no acute fracture identified    Hypertension with hypotension: continue to hold home meds, give IVFs  monitor    Acute kidney injury:   Creatinine on admit 1.41, now 1.38    Acute post operative anemia: Most recent hemoglobin 12.0 at [facility] on 12/31/2018, currently hemoglobin 9.9, likely some dilutional effect  Recheck in a.m., no active blood loss noted    Mental retardation: lives at [facility], caregiver at bedside    PLAN FOR DISPOSITION:    EMMA Chandler  Hospitalist, [facility]  01/01/2019  10:07 AM

## 2020-11-14 ENCOUNTER — HOSPITAL ENCOUNTER (INPATIENT)
Facility: HOSPITAL | Age: 65
LOS: 7 days | Discharge: NURSING, REHAB OR LONG-TERM CARE | End: 2020-11-23
Attending: EMERGENCY MEDICINE | Admitting: INTERNAL MEDICINE

## 2020-11-14 ENCOUNTER — APPOINTMENT (OUTPATIENT)
Dept: GENERAL RADIOLOGY | Facility: HOSPITAL | Age: 65
End: 2020-11-14

## 2020-11-14 ENCOUNTER — APPOINTMENT (OUTPATIENT)
Dept: CT IMAGING | Facility: HOSPITAL | Age: 65
End: 2020-11-14

## 2020-11-14 DIAGNOSIS — N17.9 ACUTE KIDNEY INJURY (HCC): ICD-10-CM

## 2020-11-14 DIAGNOSIS — E87.20 LACTIC ACIDOSIS: Primary | ICD-10-CM

## 2020-11-14 DIAGNOSIS — R13.12 OROPHARYNGEAL DYSPHAGIA: ICD-10-CM

## 2020-11-14 LAB
ALBUMIN SERPL-MCNC: 4.3 G/DL (ref 3.5–5.2)
ALBUMIN/GLOB SERPL: 1.4 G/DL
ALP SERPL-CCNC: 87 U/L (ref 39–117)
ALT SERPL W P-5'-P-CCNC: 8 U/L (ref 1–41)
ANION GAP SERPL CALCULATED.3IONS-SCNC: 11.9 MMOL/L (ref 5–15)
APTT PPP: 25.1 SECONDS (ref 24.3–38.1)
AST SERPL-CCNC: 22 U/L (ref 1–40)
BASOPHILS # BLD AUTO: 0.03 10*3/MM3 (ref 0–0.2)
BASOPHILS NFR BLD AUTO: 0.5 % (ref 0–1.5)
BILIRUB SERPL-MCNC: 0.5 MG/DL (ref 0–1.2)
BILIRUB UR QL STRIP: NEGATIVE
BUN SERPL-MCNC: 25 MG/DL (ref 8–23)
BUN/CREAT SERPL: 18 (ref 7–25)
CALCIUM SPEC-SCNC: 10.6 MG/DL (ref 8.6–10.5)
CHLORIDE SERPL-SCNC: 103 MMOL/L (ref 98–107)
CLARITY UR: CLEAR
CO2 SERPL-SCNC: 26.1 MMOL/L (ref 22–29)
COLOR UR: YELLOW
CREAT SERPL-MCNC: 1.39 MG/DL (ref 0.76–1.27)
D DIMER PPP FEU-MCNC: 2.52 MCGFEU/ML (ref 0–0.46)
D-LACTATE SERPL-SCNC: 0.9 MMOL/L (ref 0.5–2)
D-LACTATE SERPL-SCNC: 10.8 MMOL/L (ref 0.5–2)
DEPRECATED RDW RBC AUTO: 41.8 FL (ref 37–54)
EOSINOPHIL # BLD AUTO: 0.11 10*3/MM3 (ref 0–0.4)
EOSINOPHIL NFR BLD AUTO: 1.7 % (ref 0.3–6.2)
ERYTHROCYTE [DISTWIDTH] IN BLOOD BY AUTOMATED COUNT: 12.8 % (ref 12.3–15.4)
FERRITIN SERPL-MCNC: 103 NG/ML (ref 30–400)
GFR SERPL CREATININE-BSD FRML MDRD: 52 ML/MIN/1.73
GLOBULIN UR ELPH-MCNC: 3 GM/DL
GLUCOSE SERPL-MCNC: 112 MG/DL (ref 65–99)
GLUCOSE UR STRIP-MCNC: NEGATIVE MG/DL
HCT VFR BLD AUTO: 44.3 % (ref 37.5–51)
HGB BLD-MCNC: 13.7 G/DL (ref 13–17.7)
HGB UR QL STRIP.AUTO: NEGATIVE
IMM GRANULOCYTES # BLD AUTO: 0.02 10*3/MM3 (ref 0–0.05)
IMM GRANULOCYTES NFR BLD AUTO: 0.3 % (ref 0–0.5)
INR PPP: 1.12 (ref 0.9–1.1)
KETONES UR QL STRIP: NEGATIVE
LACTATE HOLD SPECIMEN: NORMAL
LDH SERPL-CCNC: 252 U/L (ref 135–225)
LEUKOCYTE ESTERASE UR QL STRIP.AUTO: NEGATIVE
LYMPHOCYTES # BLD AUTO: 1 10*3/MM3 (ref 0.7–3.1)
LYMPHOCYTES NFR BLD AUTO: 15.4 % (ref 19.6–45.3)
MCH RBC QN AUTO: 28.1 PG (ref 26.6–33)
MCHC RBC AUTO-ENTMCNC: 30.9 G/DL (ref 31.5–35.7)
MCV RBC AUTO: 90.8 FL (ref 79–97)
MONOCYTES # BLD AUTO: 0.55 10*3/MM3 (ref 0.1–0.9)
MONOCYTES NFR BLD AUTO: 8.5 % (ref 5–12)
NEUTROPHILS NFR BLD AUTO: 4.77 10*3/MM3 (ref 1.7–7)
NEUTROPHILS NFR BLD AUTO: 73.6 % (ref 42.7–76)
NITRITE UR QL STRIP: NEGATIVE
NRBC BLD AUTO-RTO: 0 /100 WBC (ref 0–0.2)
PH UR STRIP.AUTO: 7 [PH] (ref 4.5–8)
PLATELET # BLD AUTO: 355 10*3/MM3 (ref 140–450)
PMV BLD AUTO: 8.8 FL (ref 6–12)
POTASSIUM SERPL-SCNC: 4.5 MMOL/L (ref 3.5–5.2)
PROCALCITONIN SERPL-MCNC: 0.06 NG/ML (ref 0–0.25)
PROT SERPL-MCNC: 7.3 G/DL (ref 6–8.5)
PROT UR QL STRIP: NEGATIVE
PROTHROMBIN TIME: 14.1 SECONDS (ref 12.1–15)
QT INTERVAL: 337 MS
RBC # BLD AUTO: 4.88 10*6/MM3 (ref 4.14–5.8)
SARS-COV-2 RNA PNL SPEC NAA+PROBE: NOT DETECTED
SODIUM SERPL-SCNC: 141 MMOL/L (ref 136–145)
SP GR UR STRIP: 1.02 (ref 1–1.03)
TROPONIN T SERPL-MCNC: 0.06 NG/ML (ref 0–0.03)
TROPONIN T SERPL-MCNC: 0.06 NG/ML (ref 0–0.03)
TROPONIN T SERPL-MCNC: 0.07 NG/ML (ref 0–0.03)
UROBILINOGEN UR QL STRIP: NORMAL
WBC # BLD AUTO: 6.48 10*3/MM3 (ref 3.4–10.8)

## 2020-11-14 PROCEDURE — 84484 ASSAY OF TROPONIN QUANT: CPT | Performed by: INTERNAL MEDICINE

## 2020-11-14 PROCEDURE — 80053 COMPREHEN METABOLIC PANEL: CPT | Performed by: EMERGENCY MEDICINE

## 2020-11-14 PROCEDURE — 83615 LACTATE (LD) (LDH) ENZYME: CPT | Performed by: EMERGENCY MEDICINE

## 2020-11-14 PROCEDURE — 84145 PROCALCITONIN (PCT): CPT | Performed by: EMERGENCY MEDICINE

## 2020-11-14 PROCEDURE — 93005 ELECTROCARDIOGRAM TRACING: CPT | Performed by: EMERGENCY MEDICINE

## 2020-11-14 PROCEDURE — 0 IOPAMIDOL PER 1 ML: Performed by: EMERGENCY MEDICINE

## 2020-11-14 PROCEDURE — 99284 EMERGENCY DEPT VISIT MOD MDM: CPT

## 2020-11-14 PROCEDURE — 85610 PROTHROMBIN TIME: CPT | Performed by: EMERGENCY MEDICINE

## 2020-11-14 PROCEDURE — 71275 CT ANGIOGRAPHY CHEST: CPT

## 2020-11-14 PROCEDURE — 85025 COMPLETE CBC W/AUTO DIFF WBC: CPT | Performed by: EMERGENCY MEDICINE

## 2020-11-14 PROCEDURE — 82728 ASSAY OF FERRITIN: CPT | Performed by: EMERGENCY MEDICINE

## 2020-11-14 PROCEDURE — 84484 ASSAY OF TROPONIN QUANT: CPT | Performed by: EMERGENCY MEDICINE

## 2020-11-14 PROCEDURE — G0378 HOSPITAL OBSERVATION PER HR: HCPCS

## 2020-11-14 PROCEDURE — 87635 SARS-COV-2 COVID-19 AMP PRB: CPT | Performed by: EMERGENCY MEDICINE

## 2020-11-14 PROCEDURE — 85379 FIBRIN DEGRADATION QUANT: CPT | Performed by: EMERGENCY MEDICINE

## 2020-11-14 PROCEDURE — 99221 1ST HOSP IP/OBS SF/LOW 40: CPT | Performed by: INTERNAL MEDICINE

## 2020-11-14 PROCEDURE — 85730 THROMBOPLASTIN TIME PARTIAL: CPT | Performed by: EMERGENCY MEDICINE

## 2020-11-14 PROCEDURE — 25010000002 ENOXAPARIN PER 10 MG: Performed by: INTERNAL MEDICINE

## 2020-11-14 PROCEDURE — 99285 EMERGENCY DEPT VISIT HI MDM: CPT | Performed by: EMERGENCY MEDICINE

## 2020-11-14 PROCEDURE — 83605 ASSAY OF LACTIC ACID: CPT | Performed by: EMERGENCY MEDICINE

## 2020-11-14 PROCEDURE — 81003 URINALYSIS AUTO W/O SCOPE: CPT | Performed by: EMERGENCY MEDICINE

## 2020-11-14 PROCEDURE — 93010 ELECTROCARDIOGRAM REPORT: CPT | Performed by: INTERNAL MEDICINE

## 2020-11-14 RX ORDER — SODIUM CHLORIDE 0.9 % (FLUSH) 0.9 %
10 SYRINGE (ML) INJECTION EVERY 12 HOURS SCHEDULED
Status: DISCONTINUED | OUTPATIENT
Start: 2020-11-14 | End: 2020-11-23 | Stop reason: HOSPADM

## 2020-11-14 RX ORDER — MEMANTINE HYDROCHLORIDE 5 MG/1
5 TABLET ORAL EVERY 12 HOURS SCHEDULED
Status: DISCONTINUED | OUTPATIENT
Start: 2020-11-14 | End: 2020-11-23 | Stop reason: HOSPADM

## 2020-11-14 RX ORDER — ACETAMINOPHEN 325 MG/1
650 TABLET ORAL EVERY 6 HOURS PRN
Status: DISCONTINUED | OUTPATIENT
Start: 2020-11-14 | End: 2020-11-23 | Stop reason: HOSPADM

## 2020-11-14 RX ORDER — PRAVASTATIN SODIUM 20 MG
10 TABLET ORAL DAILY
Status: DISCONTINUED | OUTPATIENT
Start: 2020-11-14 | End: 2020-11-23 | Stop reason: HOSPADM

## 2020-11-14 RX ORDER — SODIUM CHLORIDE 0.9 % (FLUSH) 0.9 %
10 SYRINGE (ML) INJECTION AS NEEDED
Status: DISCONTINUED | OUTPATIENT
Start: 2020-11-14 | End: 2020-11-23 | Stop reason: HOSPADM

## 2020-11-14 RX ORDER — SODIUM CHLORIDE 9 MG/ML
125 INJECTION, SOLUTION INTRAVENOUS CONTINUOUS
Status: DISCONTINUED | OUTPATIENT
Start: 2020-11-14 | End: 2020-11-17

## 2020-11-14 RX ORDER — QUETIAPINE FUMARATE 50 MG/1
150 TABLET, EXTENDED RELEASE ORAL NIGHTLY
Status: DISCONTINUED | OUTPATIENT
Start: 2020-11-14 | End: 2020-11-23 | Stop reason: HOSPADM

## 2020-11-14 RX ORDER — SODIUM CHLORIDE 9 MG/ML
40 INJECTION, SOLUTION INTRAVENOUS AS NEEDED
Status: DISCONTINUED | OUTPATIENT
Start: 2020-11-14 | End: 2020-11-23 | Stop reason: HOSPADM

## 2020-11-14 RX ORDER — DONEPEZIL HYDROCHLORIDE 5 MG/1
5 TABLET, FILM COATED ORAL NIGHTLY
Status: DISCONTINUED | OUTPATIENT
Start: 2020-11-14 | End: 2020-11-23 | Stop reason: HOSPADM

## 2020-11-14 RX ORDER — PANTOPRAZOLE SODIUM 40 MG/1
40 TABLET, DELAYED RELEASE ORAL EVERY MORNING
Status: DISCONTINUED | OUTPATIENT
Start: 2020-11-15 | End: 2020-11-23 | Stop reason: HOSPADM

## 2020-11-14 RX ORDER — LOSARTAN POTASSIUM 50 MG/1
50 TABLET ORAL DAILY
Status: DISCONTINUED | OUTPATIENT
Start: 2020-11-15 | End: 2020-11-23 | Stop reason: HOSPADM

## 2020-11-14 RX ORDER — MULTIPLE VITAMINS W/ MINERALS TAB 9MG-400MCG
1 TAB ORAL DAILY
Status: DISCONTINUED | OUTPATIENT
Start: 2020-11-14 | End: 2020-11-23 | Stop reason: HOSPADM

## 2020-11-14 RX ORDER — ESCITALOPRAM OXALATE 10 MG/1
20 TABLET ORAL DAILY
Status: DISCONTINUED | OUTPATIENT
Start: 2020-11-15 | End: 2020-11-23 | Stop reason: HOSPADM

## 2020-11-14 RX ORDER — DOCUSATE SODIUM 100 MG/1
100 CAPSULE, LIQUID FILLED ORAL 2 TIMES DAILY
Status: DISCONTINUED | OUTPATIENT
Start: 2020-11-14 | End: 2020-11-23 | Stop reason: HOSPADM

## 2020-11-14 RX ADMIN — SODIUM CHLORIDE 125 ML/HR: 9 INJECTION, SOLUTION INTRAVENOUS at 20:45

## 2020-11-14 RX ADMIN — ENOXAPARIN SODIUM 40 MG: 40 INJECTION SUBCUTANEOUS at 20:44

## 2020-11-14 RX ADMIN — QUETIAPINE FUMARATE 150 MG: 50 TABLET, EXTENDED RELEASE ORAL at 20:44

## 2020-11-14 RX ADMIN — IOPAMIDOL 79 ML: 755 INJECTION, SOLUTION INTRAVENOUS at 15:46

## 2020-11-14 RX ADMIN — DONEPEZIL HYDROCHLORIDE 5 MG: 5 TABLET ORAL at 20:44

## 2020-11-14 RX ADMIN — SODIUM CHLORIDE, POTASSIUM CHLORIDE, SODIUM LACTATE AND CALCIUM CHLORIDE 1000 ML: 600; 310; 30; 20 INJECTION, SOLUTION INTRAVENOUS at 14:32

## 2020-11-14 RX ADMIN — MEMANTINE HYDROCHLORIDE 5 MG: 5 TABLET ORAL at 20:44

## 2020-11-14 RX ADMIN — DOCUSATE SODIUM 100 MG: 100 CAPSULE ORAL at 20:44

## 2020-11-14 RX ADMIN — SODIUM CHLORIDE, PRESERVATIVE FREE 10 ML: 5 INJECTION INTRAVENOUS at 20:45

## 2020-11-14 NOTE — ED PROVIDER NOTES
Subjective   64-year-old male brought in by caregiver with complaint of 2 to 3 days of cough, lack of appetite.  Cough is been nonproductive.  No fevers or chills.  No apparent shortness of breath.  No vomiting or diarrhea.  Caregiver states that patient generally does not complain of pain and has not now either.  She reports that patient is just acted like he does not want to do much of anything.  Did not want to take his medicines today.  Refused his usual couple coffee.            Review of Systems   Reason unable to perform ROS: ROS obtained from caregiver.   All other systems reviewed and are negative.      Past Medical History:   Diagnosis Date   • Coronary artery disease    • Injury of back    • Kidney stones    • Mental retardation        Allergies   Allergen Reactions   • Keflex [Cephalexin]        Past Surgical History:   Procedure Laterality Date   • KIDNEY SURGERY     • TONSILLECTOMY         Family History   Family history unknown: Yes       Social History     Socioeconomic History   • Marital status: Single     Spouse name: Not on file   • Number of children: Not on file   • Years of education: Not on file   • Highest education level: Not on file   Tobacco Use   • Smoking status: Never Smoker   • Smokeless tobacco: Never Used   Substance and Sexual Activity   • Alcohol use: No   • Drug use: No   • Sexual activity: Defer           Objective   Physical Exam  Constitutional:       General: He is not in acute distress.     Appearance: He is not ill-appearing or toxic-appearing.   HENT:      Head: Normocephalic and atraumatic.      Mouth/Throat:      Mouth: Mucous membranes are moist.   Eyes:      Extraocular Movements: Extraocular movements intact.      Pupils: Pupils are equal, round, and reactive to light.   Cardiovascular:      Rate and Rhythm: Normal rate and regular rhythm.      Pulses: Normal pulses.      Heart sounds: Normal heart sounds.   Pulmonary:      Effort: Pulmonary effort is normal. No  respiratory distress.      Breath sounds: Normal breath sounds.   Abdominal:      General: There is no distension.      Palpations: Abdomen is soft.      Tenderness: There is no abdominal tenderness.   Musculoskeletal:         General: No swelling, tenderness, deformity or signs of injury.      Right lower leg: No edema.      Left lower leg: No edema.   Skin:     General: Skin is warm and dry.      Capillary Refill: Capillary refill takes less than 2 seconds.   Neurological:      Mental Status: He is alert. Mental status is at baseline.         Procedures           ED Course  ED Course as of Nov 14 1633   Sat Nov 14, 2020   1415 EKG obtained at 1359 shows sinus rhythm, rate 99, normal DC and QTC, no ST segment or T wave changes.    [TD]   1444 Patient overall nontoxic-appearing, did not note any cough during my evaluation, no apparent respiratory distress.  Caregiver reports that patient is at his baseline mental status.  She states that she may have jumped the gun a little bit but was concerned because she felt like she waited too long to bring patient in last time and he ultimately ended up needing to be admitted.    [TD]   1524 Patient admitted to [facility] 2 weeks ago for pyelonephritis.  Had ESBL E. coli on urine culture, treated with meropenem and discharged on fosfomycin.  Work-up here reveals elevated lactic acid, but white cell not elevated, no fevers, procalcitonin not elevated, and vitals otherwise stable.  Patient also has slightly elevated troponin and D-dimer.  Will attempt to obtain CT angiogram of chest as patient certainly has PE risk factors of immobilization, recent hospitalization.  Covid test is negative.  Will give IV fluid bolus but at this time do not feel antibiotics are indicated.  Likely admission for further management.    [TD]   1632 No PE or pneumonia on CT of chest.  Will repeat troponin and lactic acid.  Do not have a clear etiology for a lactic acid elevation at this time.   Given remainder of presentation, do not feel antibiotics are indicated at this time.  Discussed case with  who agrees to admit for further management.  Repeat troponin and repeat lactic pending at time of our conversation.    [TD]      ED Course User Index  [TD] Joe Mcnulty MD                                           [facility]    Final diagnoses:   Lactic acidosis   Acute kidney injury (CMS/HCC)            Joe Mcnulty MD  11/14/20 6115

## 2020-11-14 NOTE — SIGNIFICANT NOTE
11/14/20 3661   Provider Notification   Reason for Communication Critical lab value  (troponin .059)   Provider Name Dr Reyes   Notification Route In person, on unit   Response No new orders;Other (Comment)  (provider aknowledged )

## 2020-11-14 NOTE — ED NOTES
The pt is from [facility]. His caregiver is Kate Butt. She can be reached at [phone].   [facility] [phone].      Jeannie Reyez, RN  11/14/20 7768

## 2020-11-15 LAB
ALBUMIN SERPL-MCNC: 3.8 G/DL (ref 3.5–5.2)
ALBUMIN/GLOB SERPL: 1.7 G/DL
ALP SERPL-CCNC: 73 U/L (ref 39–117)
ALT SERPL W P-5'-P-CCNC: 7 U/L (ref 1–41)
ANION GAP SERPL CALCULATED.3IONS-SCNC: 12 MMOL/L (ref 5–15)
AST SERPL-CCNC: 16 U/L (ref 1–40)
BASOPHILS # BLD AUTO: 0.04 10*3/MM3 (ref 0–0.2)
BASOPHILS NFR BLD AUTO: 1 % (ref 0–1.5)
BILIRUB SERPL-MCNC: 0.4 MG/DL (ref 0–1.2)
BUN SERPL-MCNC: 21 MG/DL (ref 8–23)
BUN/CREAT SERPL: 19.4 (ref 7–25)
CALCIUM SPEC-SCNC: 9.4 MG/DL (ref 8.6–10.5)
CHLORIDE SERPL-SCNC: 106 MMOL/L (ref 98–107)
CHOLEST SERPL-MCNC: 110 MG/DL (ref 0–200)
CO2 SERPL-SCNC: 23 MMOL/L (ref 22–29)
CREAT SERPL-MCNC: 1.08 MG/DL (ref 0.76–1.27)
D-LACTATE SERPL-SCNC: 0.5 MMOL/L (ref 0.5–2)
DEPRECATED RDW RBC AUTO: 41.5 FL (ref 37–54)
EOSINOPHIL # BLD AUTO: 0.09 10*3/MM3 (ref 0–0.4)
EOSINOPHIL NFR BLD AUTO: 2.3 % (ref 0.3–6.2)
ERYTHROCYTE [DISTWIDTH] IN BLOOD BY AUTOMATED COUNT: 12.6 % (ref 12.3–15.4)
GFR SERPL CREATININE-BSD FRML MDRD: 69 ML/MIN/1.73
GLOBULIN UR ELPH-MCNC: 2.3 GM/DL
GLUCOSE SERPL-MCNC: 84 MG/DL (ref 65–99)
HCT VFR BLD AUTO: 38.6 % (ref 37.5–51)
HDLC SERPL-MCNC: 38 MG/DL (ref 40–60)
HGB BLD-MCNC: 12.1 G/DL (ref 13–17.7)
IMM GRANULOCYTES # BLD AUTO: 0 10*3/MM3 (ref 0–0.05)
IMM GRANULOCYTES NFR BLD AUTO: 0 % (ref 0–0.5)
LDLC SERPL CALC-MCNC: 58 MG/DL (ref 0–100)
LDLC/HDLC SERPL: 1.56 {RATIO}
LYMPHOCYTES # BLD AUTO: 0.87 10*3/MM3 (ref 0.7–3.1)
LYMPHOCYTES NFR BLD AUTO: 22.1 % (ref 19.6–45.3)
MAGNESIUM SERPL-MCNC: 2.1 MG/DL (ref 1.6–2.4)
MCH RBC QN AUTO: 28.4 PG (ref 26.6–33)
MCHC RBC AUTO-ENTMCNC: 31.3 G/DL (ref 31.5–35.7)
MCV RBC AUTO: 90.6 FL (ref 79–97)
MONOCYTES # BLD AUTO: 0.41 10*3/MM3 (ref 0.1–0.9)
MONOCYTES NFR BLD AUTO: 10.4 % (ref 5–12)
NEUTROPHILS NFR BLD AUTO: 2.53 10*3/MM3 (ref 1.7–7)
NEUTROPHILS NFR BLD AUTO: 64.2 % (ref 42.7–76)
PHOSPHATE SERPL-MCNC: 2.8 MG/DL (ref 2.5–4.5)
PLATELET # BLD AUTO: 328 10*3/MM3 (ref 140–450)
PMV BLD AUTO: 8.4 FL (ref 6–12)
POTASSIUM SERPL-SCNC: 4.3 MMOL/L (ref 3.5–5.2)
PROCALCITONIN SERPL-MCNC: 0.04 NG/ML (ref 0–0.25)
PROT SERPL-MCNC: 6.1 G/DL (ref 6–8.5)
RBC # BLD AUTO: 4.26 10*6/MM3 (ref 4.14–5.8)
SODIUM SERPL-SCNC: 141 MMOL/L (ref 136–145)
TRIGL SERPL-MCNC: 63 MG/DL (ref 0–150)
VLDLC SERPL-MCNC: 14 MG/DL (ref 5–40)
WBC # BLD AUTO: 3.94 10*3/MM3 (ref 3.4–10.8)

## 2020-11-15 PROCEDURE — 99232 SBSQ HOSP IP/OBS MODERATE 35: CPT | Performed by: INTERNAL MEDICINE

## 2020-11-15 PROCEDURE — 83605 ASSAY OF LACTIC ACID: CPT | Performed by: INTERNAL MEDICINE

## 2020-11-15 PROCEDURE — 84145 PROCALCITONIN (PCT): CPT | Performed by: INTERNAL MEDICINE

## 2020-11-15 PROCEDURE — 80061 LIPID PANEL: CPT | Performed by: INTERNAL MEDICINE

## 2020-11-15 PROCEDURE — 85025 COMPLETE CBC W/AUTO DIFF WBC: CPT | Performed by: INTERNAL MEDICINE

## 2020-11-15 PROCEDURE — 84100 ASSAY OF PHOSPHORUS: CPT | Performed by: INTERNAL MEDICINE

## 2020-11-15 PROCEDURE — 80053 COMPREHEN METABOLIC PANEL: CPT | Performed by: INTERNAL MEDICINE

## 2020-11-15 PROCEDURE — 25010000002 ENOXAPARIN PER 10 MG: Performed by: INTERNAL MEDICINE

## 2020-11-15 PROCEDURE — 99203 OFFICE O/P NEW LOW 30 MIN: CPT | Performed by: INTERNAL MEDICINE

## 2020-11-15 PROCEDURE — 83735 ASSAY OF MAGNESIUM: CPT | Performed by: INTERNAL MEDICINE

## 2020-11-15 PROCEDURE — G0378 HOSPITAL OBSERVATION PER HR: HCPCS

## 2020-11-15 RX ADMIN — MEMANTINE HYDROCHLORIDE 5 MG: 5 TABLET ORAL at 09:42

## 2020-11-15 RX ADMIN — SODIUM CHLORIDE, PRESERVATIVE FREE 10 ML: 5 INJECTION INTRAVENOUS at 21:34

## 2020-11-15 RX ADMIN — ENOXAPARIN SODIUM 40 MG: 40 INJECTION SUBCUTANEOUS at 18:15

## 2020-11-15 RX ADMIN — SODIUM CHLORIDE 125 ML/HR: 9 INJECTION, SOLUTION INTRAVENOUS at 04:43

## 2020-11-15 RX ADMIN — SODIUM CHLORIDE 125 ML/HR: 9 INJECTION, SOLUTION INTRAVENOUS at 12:32

## 2020-11-15 RX ADMIN — PANTOPRAZOLE SODIUM 40 MG: 40 TABLET, DELAYED RELEASE ORAL at 06:36

## 2020-11-15 NOTE — CONSULTS
Date of Hospital Visit: 2020  Date of consult:   Encounter Provider: Blas Toledo MD  Place of Service: [facility] CARDIOLOGY  Patient Name: Arian Persaud  :1955  Referral Provider: MAYDA RIVERA     Chief complaint: Cough, lack of appetite    Reason for consult: elevated troponin     History of Present Illness    Arian Persaud is a 64 y.o. male with past medical history of significant mental retardation/disability and nursing home resident was brought to the ER with several days history of cough and decreased appetite.  I could not find further details as patient is unable to give history and people who know him closely when around.  Other reported past medical history in chart include a history of chronic left nephrolithiasis, coronary artery disease, hypertension and dementia who lives at [facility]. He had an admission two weeks ago at [facility] for pyelonephritis and ESBL and E. Coli in his urine.      He presented to the [facility] ER on  with complaints of a non productive cough over the last several days and poor appetite. His caregiver stated he has not complained of pain but has been unable to do much of anything, including taking his medications. The patient denied shortness of breath, fever, chills, nausea, vomiting or diarrhea.     Labs revealed elevated troponin that has been with no significant delta. His BUN/Cr are 21/1.08 which appears to be his baseline. His initial lactate was 10.8 and has normalized after 1L of Lactated Ringer. His D-dimer is 2.52 and CTA showed no evidence of PE.             Past Medical History:   Diagnosis Date   • Coronary artery disease    • Injury of back    • Kidney stones    • Mental retardation        Past Surgical History:   Procedure Laterality Date   • KIDNEY SURGERY     • TONSILLECTOMY         Medications Prior to Admission   Medication Sig Dispense Refill Last Dose   • baclofen (LIORESAL) 10 MG tablet Take 5  mg by mouth 3 (Three) Times a Day. Is not taking   Patient Taking Differently at Unknown time   • calcium carbonate (OS-JOSHUA) 600 MG tablet Take 600 mg by mouth 2 (Two) Times a Day With Meals. WITH 400 UNITS OF VIT D    11/14/2020 at 0800   • docusate sodium (COLACE) 100 MG capsule Take 100 mg by mouth every other day.   11/14/2020 at 0800   • donepezil (ARICEPT) 5 MG tablet Take 5 mg by mouth.   11/14/2020 at 0800   • escitalopram (LEXAPRO) 20 MG tablet Take 20 mg by mouth Daily.   11/14/2020 at 0800   • loratadine (CLARITIN) 10 MG tablet Take 10 mg by mouth daily.   11/14/2020 at 0800   • losartan (COZAAR) 50 MG tablet Take 50 mg by mouth Daily.   11/14/2020 at 0800   • memantine (NAMENDA) 5 MG tablet Take 5 mg by mouth 2 (Two) Times a Day.   11/14/2020 at 0800   • montelukast (SINGULAIR) 10 MG tablet Take 10 mg by mouth Every Night.   11/13/2020 at 2100   • Multiple Vitamins-Minerals (MULTIVITAMIN WITH MINERALS) tablet tablet Take 1 tablet by mouth Daily.   11/14/2020 at 0800   • omeprazole (PriLOSEC) 40 MG capsule Take 40 mg by mouth daily.   11/14/2020 at 0800   • polyethylene glycol (MIRALAX) packet Take 8.5 g by mouth Daily.   11/13/2020 at 2100   • pravastatin (PRAVACHOL) 10 MG tablet Take 10 mg by mouth Daily.   11/13/2020 at 2100   • QUEtiapine XR (SEROquel XR) 150 MG 24 hr tablet Take 100 mg by mouth Every Night.   11/13/2020 at 2100   • tamsulosin (FLOMAX) 0.4 MG capsule 24 hr capsule Take 1 capsule by mouth every night.   11/13/2020 at 2100   • trimethoprim (TRIMPEX) 100 MG tablet Take 100 mg by mouth Daily.   11/14/2020 at 0800   • acetaminophen (TYLENOL) 325 MG tablet Take 650 mg by mouth Every 6 (Six) Hours As Needed for Mild Pain .      • ibuprofen (ADVIL,MOTRIN) 800 MG tablet Take 800 mg by mouth Every 8 (Eight) Hours As Needed for Mild Pain  (as needed for pain).   Unknown at Unknown time   • ketoconazole (NIZORAL) 2 % cream Apply  topically to the appropriate area as directed Daily.   Unknown at  Unknown time   • mineral oil 55 % oil oral liquid Take 30 mL by mouth 1 (One) Time.   Unknown at Unknown time   • nitrofurantoin (MACRODANTIN) 100 MG capsule Take 100 mg by mouth 2 (two) times a day.   Unknown at Unknown time       Current Meds  Scheduled Meds:docusate sodium, 100 mg, Oral, BID  donepezil, 5 mg, Oral, Nightly  enoxaparin, 40 mg, Subcutaneous, Q24H  escitalopram, 20 mg, Oral, Daily  losartan, 50 mg, Oral, Daily  memantine, 5 mg, Oral, Q12H  multivitamin with minerals, 1 tablet, Oral, Daily  pantoprazole, 40 mg, Oral, QAM  pravastatin, 10 mg, Oral, Daily  QUEtiapine XR, 150 mg, Oral, Nightly  sodium chloride, 10 mL, Intravenous, Q12H      Continuous Infusions:sodium chloride, 125 mL/hr, Last Rate: 125 mL/hr (11/16/20 0458)      PRN Meds:.•  acetaminophen  •  sodium chloride  •  sodium chloride    Allergies as of 11/14/2020 - Reviewed 11/14/2020   Allergen Reaction Noted   • Keflex [cephalexin]  08/09/2016       Social History     Socioeconomic History   • Marital status: Single     Spouse name: Not on file   • Number of children: Not on file   • Years of education: Not on file   • Highest education level: Not on file   Tobacco Use   • Smoking status: Never Smoker   • Smokeless tobacco: Never Used   Substance and Sexual Activity   • Alcohol use: No   • Drug use: No   • Sexual activity: Defer       Family History   Family history unknown: Yes       REVIEW OF SYSTEMS:   Unable to complete because of patient's mental status and severe mental agitation       Objective:   Temp:  [97.8 °F (36.6 °C)-100.3 °F (37.9 °C)] 98.3 °F (36.8 °C)  Heart Rate:  [] 92  Resp:  [15-18] 18  BP: (131-152)/(76-86) 133/77  Body mass index is 18.3 kg/m².  Flowsheet Rows      First Filed Value   Admission Height  --   Admission Weight  49.9 kg (110 lb) Documented at 11/14/2020 1745        Vitals:    11/16/20 0542   BP: 133/77   Pulse: 92   Resp: 18   Temp: 98.3 °F (36.8 °C)   SpO2: 96%       General Appearance:    Alert,  cooperative, in no acute distress,    Head:    Normocephalic, without obvious abnormality, atraumatic   Eyes:            Lids and lashes normal, conjunctivae and sclerae normal, no   icterus, no pallor, corneas clear, PERRLA   Ears:    Ears appear intact with no abnormalities noted   Throat:   No oral lesions, no thrush, oral mucosa moist   Neck:   No adenopathy, supple, trachea midline, no thyromegaly, no   carotid bruit, no JVD   Back:     No kyphosis present, no scoliosis present, no skin lesions, erythema or scars, no tenderness to percussion or palpation, range of motion normal   Lungs:     Clear to auscultation,respirations regular, even and unlabored    Heart:    Regular rhythm and normal rate, normal S1 and S2, no murmur, no gallop, no rub, no click   Chest Wall:    No abnormalities observed   Abdomen:     Normal bowel sounds, no masses, no organomegaly, soft nontender, nondistended, no guarding, no rebound  tenderness   Extremities:  Contracture of extremity   Pulses:   Pulses palpable and equal bilaterally. Normal radial, carotid, femoral, dorsalis pedis and posterior tibial pulses bilaterally. Normal abdominal aorta   Skin:  Neurology:   Psychiatric:   No bleeding, bruising or rash   Normal speech and cranial nerve exam, no focal deficit   Alert but confused             Review of Data:      Results from last 7 days   Lab Units 11/15/20  0333   SODIUM mmol/L 141   POTASSIUM mmol/L 4.3   CHLORIDE mmol/L 106   CO2 mmol/L 23.0   BUN mg/dL 21   CREATININE mg/dL 1.08   CALCIUM mg/dL 9.4   BILIRUBIN mg/dL 0.4   ALK PHOS U/L 73   ALT (SGPT) U/L 7   AST (SGOT) U/L 16   GLUCOSE mg/dL 84     Results from last 7 days   Lab Units 11/14/20  1823 11/14/20  1623 11/14/20  1412   TROPONIN T ng/mL 0.059* 0.063* 0.065*     @LABRCNTbnp@  Results from last 7 days   Lab Units 11/15/20  0333 11/14/20  1412   WBC 10*3/mm3 3.94 6.48   HEMOGLOBIN g/dL 12.1* 13.7   HEMATOCRIT % 38.6 44.3   PLATELETS 10*3/mm3 328 355     Results  from last 7 days   Lab Units 11/14/20  1412   INR  1.12*   APTT seconds 25.1     Results from last 7 days   Lab Units 11/15/20  0333   MAGNESIUM mg/dL 2.1     @LABRCNTIP(chol,trig,hdl,ldl)            I personally viewed and interpreted the patient's EKG/Telemetry data  )  Patient Active Problem List   Diagnosis   • Sepsis (CMS/HCC)   • Lactic acidosis   • Mental retardation   • Essential hypertension   • GERD (gastroesophageal reflux disease)   • Elevated troponin   • Dementia (CMS/HCC)     Assessment and Plan:     Lactic acidosis   Elevated troponin    Dementia developmentally delayed   Hypertension   Hyperlipidemia   ISH     Patient with unexplained lactic acidosis so far that resolved after IV fluid hydration.  No evidence for infection or sepsis.  Also had ISH as creatinine improved with IV fluid.  Elevated troponin that is flat with no delta likely related to underlying physiologic stress that resulted in lactic acidosis and ISH.  EKG unremarkable.  Echocardiogram with normal left ventricular ejection fraction and regional wall motion.  Continuing statin and losartan. No further testing    Thank you for consulting with cardiology.    Cardiology will sign off for now.      Blas Toledo MD  11/16/20  10:40 EST.  Time spent in reviewing chart, discussion and examination:

## 2020-11-15 NOTE — NURSING NOTE
Discharge Planning Assessment  JOANN Nelson     Patient Name: Arian Persaud  MRN: [id number]  Today's Date: 11/15/2020    Admit Date: 11/14/2020    Discharge Needs Assessment     Row Name 11/15/20 0403       Living Environment    Lives With  other (see comments)    Current Living Arrangements  residential facility    Duration at Residence  [facility]    Primary Care Provided by  other (see comments)    Provides Primary Care For  no one, unable/limited ability to care for self    Family Caregiver if Needed  other (see comments)    Able to Return to Prior Arrangements  yes       Resource/Environmental Concerns    Resource/Environmental Concerns  none    Transportation Concerns  car, none       Transition Planning    Patient/Family Anticipates Transition to  other (see comments)    Transportation Anticipated  other (see comments)       Discharge Needs Assessment    Readmission Within the Last 30 Days  no previous admission in last 30 days    Equipment Currently Used at Home  walker, rolling;wheelchair;orthotic device    Anticipated Changes Related to Illness  none    Equipment Needed After Discharge  none    Provided Post Acute Provider List?  N/A    Provided Post Acute Provider Quality & Resource List?  N/A        Discharge Plan     Row Name 11/15/20 0580       Plan    Plan  return to [facility] @ [place]    Plan Comments  Attempt to see patient this morning, he is sleeping in bed - contractures noted. Spoke with Jania FERNÁNDEZ- who informs she has not seen patients care giver today. Patient has develpmental disability and is from [facility] [phone]. Spoke via phone with patients care giver, Kate Butt [phone]. She states that [facility] is a [facility] with 20 group homes. The address on the face sheet is the residence the patient lives in with 2 roommates. Each residence is staffed 24/7. She states patients health has been slowly declining. Initally he could ambulate with RW w assist from PT/OT.  Since he has been sick, he has gotten weaker and now is using a wc for mobility. He is able to feed himself a mechanical soft/thickened liquid diet with some assistance. She states he wears briefs and says he is a 2 person lift at this time. He uses leg braces and a staff member can deliver them to the hospital tomorrow and show RN how to use them. The facility can provide transportation at dc. She states patient has a state guardian, Beny Simon. Face sheet updated. Attempt to contact guardian, numbers provided did not indicate confidential voice mail, no message left. CM will continue to follow to assist with dc needs.        Continued Care and Services - Admitted Since 11/14/2020    Coordination has not been started for this encounter.         Demographic Summary     Row Name 11/15/20 4187       General Information    Admission Type  observation    Arrived From  other (see comments)    Referral Source  admission list    Reason for Consult  discharge planning    Preferred Language  English     Used During This Interaction  no        Functional Status    No documentation.       Psychosocial    No documentation.       Abuse/Neglect    No documentation.       Legal    No documentation.       Substance Abuse    No documentation.       Patient Forms    No documentation.           Bill Trujillo RN

## 2020-11-15 NOTE — H&P
[facility] HOSPITALIST     Hyacinth Elizabeth MD    CHIEF COMPLAINT:     Cough, decreased appetite    HISTORY OF PRESENT ILLNESS:    Patient is a 65 y/o male with MR who presented to the ED after his caregiver reports a 2-3 day history of cough and lack of appetite. Caregiver is not available at time of my exam but review of ED records shows that they denied, fever/chills, n/v/d. Caregiver did report patient is not being as active as usual and refused his coffee and medications which is unusual.   Patient denies any pain or shortness of breath at time of my exam.       Past Medical History:   Diagnosis Date   • Coronary artery disease    • Injury of back    • Kidney stones    • Mental retardation      Past Surgical History:   Procedure Laterality Date   • KIDNEY SURGERY     • TONSILLECTOMY       Family History   Family history unknown: Yes     Social History     Tobacco Use   • Smoking status: Never Smoker   • Smokeless tobacco: Never Used   Substance Use Topics   • Alcohol use: No   • Drug use: No     Medications Prior to Admission   Medication Sig Dispense Refill Last Dose   • baclofen (LIORESAL) 10 MG tablet Take 5 mg by mouth 3 (Three) Times a Day. Is not taking   Patient Taking Differently at Unknown time   • calcium carbonate (OS-JOSHUA) 600 MG tablet Take 600 mg by mouth 2 (Two) Times a Day With Meals. WITH 400 UNITS OF VIT D    11/14/2020 at 0800   • docusate sodium (COLACE) 100 MG capsule Take 100 mg by mouth every other day.   11/14/2020 at 0800   • donepezil (ARICEPT) 5 MG tablet Take 5 mg by mouth.   11/14/2020 at 0800   • escitalopram (LEXAPRO) 20 MG tablet Take 20 mg by mouth Daily.   11/14/2020 at 0800   • loratadine (CLARITIN) 10 MG tablet Take 10 mg by mouth daily.   11/14/2020 at 0800   • losartan (COZAAR) 50 MG tablet Take 50 mg by mouth Daily.   11/14/2020 at 0800   • memantine (NAMENDA) 5 MG tablet Take 5 mg by mouth 2 (Two) Times a Day.   11/14/2020 at 0800   •  montelukast (SINGULAIR) 10 MG tablet Take 10 mg by mouth Every Night.   11/13/2020 at 2100   • Multiple Vitamins-Minerals (MULTIVITAMIN WITH MINERALS) tablet tablet Take 1 tablet by mouth Daily.   11/14/2020 at 0800   • omeprazole (PriLOSEC) 40 MG capsule Take 40 mg by mouth daily.   11/14/2020 at 0800   • polyethylene glycol (MIRALAX) packet Take 8.5 g by mouth Daily.   11/13/2020 at 2100   • pravastatin (PRAVACHOL) 10 MG tablet Take 10 mg by mouth Daily.   11/13/2020 at 2100   • QUEtiapine XR (SEROquel XR) 150 MG 24 hr tablet Take 100 mg by mouth Every Night.   11/13/2020 at 2100   • tamsulosin (FLOMAX) 0.4 MG capsule 24 hr capsule Take 1 capsule by mouth every night.   11/13/2020 at 2100   • trimethoprim (TRIMPEX) 100 MG tablet Take 100 mg by mouth Daily.   11/14/2020 at 0800   • acetaminophen (TYLENOL) 325 MG tablet Take 650 mg by mouth Every 6 (Six) Hours As Needed for Mild Pain .      • ibuprofen (ADVIL,MOTRIN) 800 MG tablet Take 800 mg by mouth Every 8 (Eight) Hours As Needed for Mild Pain  (as needed for pain).   Unknown at Unknown time   • ketoconazole (NIZORAL) 2 % cream Apply  topically to the appropriate area as directed Daily.   Unknown at Unknown time   • mineral oil 55 % oil oral liquid Take 30 mL by mouth 1 (One) Time.   Unknown at Unknown time   • nitrofurantoin (MACRODANTIN) 100 MG capsule Take 100 mg by mouth 2 (two) times a day.   Unknown at Unknown time     Allergies:  Keflex [cephalexin]      There is no immunization history on file for this patient.        REVIEW OF SYSTEMS:    Unable to review given patients mental status.       Vital Signs  Temp:  [98 °F (36.7 °C)] 98 °F (36.7 °C)  Heart Rate:  [] 95  Resp:  [16-18] 18  BP: (107-153)/(67-84) 129/78    Flowsheet Rows      First Filed Value   Admission Height  --   Admission Weight  49.9 kg (110 lb) Documented at 11/14/2020 1745             Physical Exam:    Physical Exam  Vitals signs reviewed.   Constitutional:       General: He is not  in acute distress.     Appearance: He is normal weight.   HENT:      Head: Normocephalic and atraumatic.      Mouth/Throat:      Mouth: Mucous membranes are moist.   Eyes:      General: No scleral icterus.     Pupils: Pupils are equal, round, and reactive to light.   Cardiovascular:      Rate and Rhythm: Normal rate and regular rhythm.      Heart sounds: No murmur.   Pulmonary:      Effort: Pulmonary effort is normal. No respiratory distress.      Breath sounds: No wheezing or rales.   Abdominal:      General: Bowel sounds are normal. There is no distension.      Tenderness: There is no abdominal tenderness. There is no guarding.   Skin:     General: Skin is warm and dry.      Findings: No rash.   Neurological:      Mental Status: He is alert.      Comments: Oriented to self and place   Psychiatric:         Mood and Affect: Mood normal.         Behavior: Behavior normal.               Results Review:    I reviewed the patient's new clinical results.  Lab Results (most recent)     Procedure Component Value Units Date/Time    Troponin [[id number]]  (Abnormal) Collected: 11/14/20 1823    Specimen: Blood Updated: 11/14/20 1853     Troponin T 0.059 ng/mL     Narrative:      Troponin T Reference Range:  <= 0.03 ng/mL-   Negative for AMI  >0.03 ng/mL-     Abnormal for myocardial necrosis.  Clinicians would have to utilize clinical acumen, EKG, Troponin and serial changes to determine if it is an Acute Myocardial Infarction or myocardial injury due to an underlying chronic condition.       Results may be falsely decreased if patient taking Biotin.      Lactic Acid, Reflex [[id number]]  (Normal) Collected: 11/14/20 1828    Specimen: Blood Updated: 11/14/20 1846     Lactate 0.9 mmol/L     Lactic Acid, Reflex Timer (This will reflex a repeat order 3-3:15 hours after ordered.) [[id number]] Collected: 11/14/20 1412    Specimen: Blood Updated: 11/14/20 1815     Hold Tube Hold for add-ons.     Comment: Auto resulted.       Troponin  [[id number]]  (Abnormal) Collected: 11/14/20 1623    Specimen: Blood Updated: 11/14/20 1649     Troponin T 0.063 ng/mL     Narrative:      Troponin T Reference Range:  <= 0.03 ng/mL-   Negative for AMI  >0.03 ng/mL-     Abnormal for myocardial necrosis.  Clinicians would have to utilize clinical acumen, EKG, Troponin and serial changes to determine if it is an Acute Myocardial Infarction or myocardial injury due to an underlying chronic condition.       Results may be falsely decreased if patient taking Biotin.      aPTT [[id number]]  (Normal) Collected: 11/14/20 1412    Specimen: Blood Updated: 11/14/20 1532     PTT 25.1 seconds     Narrative:      PTT = The equivalent PTT values for the therapeutic range of heparin levels at 0.1 to 0.7 U/ml are 53 to 110 seconds.      Protime-INR [[id number]]  (Abnormal) Collected: 11/14/20 1412    Specimen: Blood Updated: 11/14/20 1532     Protime 14.1 Seconds      INR 1.12    Narrative:      Therapeutic Ranges for INR: 2.0-3.0 (PT 20-30)                              2.5-3.5 (PT 25-34)    Urinalysis With Culture If Indicated - Urine, Clean Catch [[id number]]  (Normal) Collected: 11/14/20 1519    Specimen: Urine, Clean Catch Updated: 11/14/20 1528     Color, UA Yellow     Appearance, UA Clear     pH, UA 7.0     Specific Gravity, UA 1.025     Glucose, UA Negative     Ketones, UA Negative     Bilirubin, UA Negative     Blood, UA Negative     Protein, UA Negative     Leuk Esterase, UA Negative     Nitrite, UA Negative     Urobilinogen, UA 1.0 E.U./dL    Narrative:      Urine microscopic not indicated.    Lactate Dehydrogenase [[id number]]  (Abnormal) Collected: 11/14/20 1412    Specimen: Blood Updated: 11/14/20 1510      U/L      Comment: Specimen hemolyzed.  Results may be affected.       COVID-19,[facility],Nasal Swab No Transport Media 3-4 HR TAT - Swab, Nasal Cavity [[id number]]  (Normal) Collected: 11/14/20 1416    Specimen: Swab from Nasal Cavity Updated: 11/14/20 1509      COVID19 Not Detected    Narrative:      Fact sheet for providers: https://www.fda.gov/media/463771/download     Fact sheet for patients: https://www.fda.gov/media/267619/download    Comprehensive Metabolic Panel [[id number]]  (Abnormal) Collected: 11/14/20 1412    Specimen: Blood Updated: 11/14/20 1507     Glucose 112 mg/dL      BUN 25 mg/dL      Creatinine 1.39 mg/dL      Sodium 141 mmol/L      Potassium 4.5 mmol/L      Comment: Slight hemolysis detected by analyzer. Results may be affected.        Chloride 103 mmol/L      CO2 26.1 mmol/L      Calcium 10.6 mg/dL      Total Protein 7.3 g/dL      Albumin 4.30 g/dL      ALT (SGPT) 8 U/L      AST (SGOT) 22 U/L      Alkaline Phosphatase 87 U/L      Total Bilirubin 0.5 mg/dL      eGFR Non African Amer 52 mL/min/1.73      Globulin 3.0 gm/dL      A/G Ratio 1.4 g/dL      BUN/Creatinine Ratio 18.0     Anion Gap 11.9 mmol/L     Narrative:      GFR Normal >60  Chronic Kidney Disease <60  Kidney Failure <15      Ferritin [[id number]]  (Normal) Collected: 11/14/20 1412    Specimen: Blood Updated: 11/14/20 1507     Ferritin 103.00 ng/mL     Narrative:      Results may be falsely decreased if patient taking Biotin.      Procalcitonin [[id number]]  (Normal) Collected: 11/14/20 1412    Specimen: Blood Updated: 11/14/20 1507     Procalcitonin 0.06 ng/mL     Narrative:      Results may be falsely decreased if patient taking Biotin.     Lactic Acid, Plasma [[id number]]  (Abnormal) Collected: 11/14/20 1412    Specimen: Blood Updated: 11/14/20 1505     Lactate 10.8 mmol/L     D-dimer, Quantitative [[id number]]  (Abnormal) Collected: 11/14/20 1412    Specimen: Blood Updated: 11/14/20 1455     D-Dimer, Quantitative 2.52 MCGFEU/mL     Narrative:      Can be elevated in, but is not diagnostic for deep vein thrombosis (DVT) or pulmonary embolis (PE).  It is also elevated in other medical conditions.  Clinical correlation is required.  The negative cut-off value for the D-Dimer is 0.50 mcg  FEU/mL for DVT and PE.      CBC & Differential [[id number]]  (Abnormal) Collected: 11/14/20 1412    Specimen: Blood Updated: 11/14/20 1439    Narrative:      The following orders were created for panel order CBC & Differential.  Procedure                               Abnormality         Status                     ---------                               -----------         ------                     CBC Auto Differential[[id number]]        Abnormal            Final result                 Please view results for these tests on the individual orders.    CBC Auto Differential [[id number]]  (Abnormal) Collected: 11/14/20 1412    Specimen: Blood Updated: 11/14/20 1439     WBC 6.48 10*3/mm3      RBC 4.88 10*6/mm3      Hemoglobin 13.7 g/dL      Hematocrit 44.3 %      MCV 90.8 fL      MCH 28.1 pg      MCHC 30.9 g/dL      RDW 12.8 %      RDW-SD 41.8 fl      MPV 8.8 fL      Platelets 355 10*3/mm3      Neutrophil % 73.6 %      Lymphocyte % 15.4 %      Monocyte % 8.5 %      Eosinophil % 1.7 %      Basophil % 0.5 %      Immature Grans % 0.3 %      Neutrophils, Absolute 4.77 10*3/mm3      Lymphocytes, Absolute 1.00 10*3/mm3      Monocytes, Absolute 0.55 10*3/mm3      Eosinophils, Absolute 0.11 10*3/mm3      Basophils, Absolute 0.03 10*3/mm3      Immature Grans, Absolute 0.02 10*3/mm3      nRBC 0.0 /100 WBC           Imaging Results (Most Recent)     Procedure Component Value Units Date/Time    CT Angiogram Chest [[id number]] Collected: 11/14/20 1619     Updated: 11/14/20 1622    Narrative:      PROCEDURE: CTA Chest    COMPARISON: No relevant comparison or correlation studies available at time of dictation.     INDICATIONS: PE suspected, low/intermediate prob, positive D-dimer;Acidosis;Acute kidney failure, unspecified  ;Fever, lethargy, anorexia x's about 2 days per care giver.  Elevated D-dimer.; Pt  Is mentally handicapped from [employer].;No chest  surgeries.    TECHNIQUE:     CTA of the chest is performed with IV contrast.  Multiplanar MIP and 3-D reconstructions  images are performed on separate workstation under concurrent radiologist supervision per protocol and reviewed.  Radiation dose reduction techniques included automated exposure control or exposure modulation based on body size. Count of known CT and cardiac nuc med studies performed in previous 12 months: 1.          FINDINGS: Examination hindered due to patient's contracted state and mild kyphosis and arms being within the gantry. There is also some respiratory motion hindering assessment for small pulmonary nodules.   Study optimized for evaluation of the pulmonary arteries for pulmonary embolus.    No filling defects seen within the pulmonary arterial tree to indicate acute pulmonary embolus.  Lungs are grossly clear, no focal consolidation seen.  A couple of small peripheral subpleural pulmonary nodule suggested, one of the largest in the left lower lobe estimated at approximately 4 mm average diameter. No focal infiltrate seen. Debris noted  within the trachea. Esophageal wall prominence is nonspecific. Marked atrophy of the left kidney with large upper pole renal calculus measuring 10 mm seen with cystic changes and decreased enhancement compared to the right kidney.    No gross acute bony abnormality seen.         Impression:      1. Study limited due to artifact but no evidence of PE.      2. Questionable small pulmonary nodules in this 64-year-old gentleman. Unfortunately, there is respiratory motion artifact. Recommend 6 month follow-up chest CT.  3. Marked atrophy of the left kidney with cystic changes and large upper pole calculus.       Signer Name: Marycarmen Rivera MD   Signed: 11/14/2020 4:19 PM   Workstation Name: [id number]    [facility]        reviewed    ECG/EMG Results (most recent)     Procedure Component Value Units Date/Time    ECG 12 Lead [[id number]] Collected: 11/14/20 1359     Updated: 11/14/20 1747     QT Interval 337 ms      Narrative:      HEART RATE= 99  bpm  RR Interval= 604  ms  OK Interval= 121  ms  P Horizontal Axis= -2  deg  P Front Axis= 59  deg  QRSD Interval= 87  ms  QT Interval= 337  ms  QRS Axis= -49  deg  T Wave Axis= 60  deg  - NORMAL ECG -  Sinus rhythm  When compared with ECG of 04-Dec-2018 12:21:54,  No significant change  Electronically Signed By: Burke Garcia ([facility]) 14-Nov-2020 17:45:03  Date and Time of Study: 2020-11-14 13:59:52        reviewed    Assessment/Plan   Active Hospital Problems    Diagnosis  POA   • Lactic acidosis [E87.2]  Yes      Resolved Hospital Problems   No resolved problems to display.       Lactis acidosis- resolved  - unclear etiology   - s/p 1L LR  - continue IV fluids for now     Elevated troponin   - pt denies any chest pain  - reported history of CAD  - will check echocardiogram    ? Pulmonary nodule  - needs repeat Chest CT in 6 months     Dementia with developmental delay/ anxiety  - continue donepezil, memantine, escitalopram, Seroquel     HTN  - continue losartan, statin     GERD  - continue ppi    DVT Prophylaxis  - enoxaparin           I discussed the patient's findings and my recommendations with patient        [place] DO Zac  11/14/20  20:35 EST

## 2020-11-15 NOTE — PROGRESS NOTES
[facility] HOSPITALIST TEAM   PROGRESS NOTE      Patient Care Team:  Hyacinth Elizabeth MD as PCP - General (Internal Medicine)    Patient seen at bedside    Hospitalist Team      Patient Care Team:  Provider, No Known as PCP - General          Subjective      Presenting History and Chief Complaints:      Chief Complaint:      Cough and decreased appetite    Admission History:    Mr. Arian Persaud is a 64 year old  male who has hx of Mental Retardation and he is a resident of a NH. He was brought to ED yesterday by his cregiver with c/o of cough and decreased appetite.    Interval History and ROS:     Patient States Patient has MR and is noncommunicative  Patient Complaints: Patient has MR and is noncommunicative  Patient Denies:  Patient has MR and is noncommunicative  History taken from: Nurse      Objective    Vital Signs  Temp:  [98 °F (36.7 °C)-98.4 °F (36.9 °C)] 98.4 °F (36.9 °C)  Heart Rate:  [] 94  Resp:  [16-18] 16  BP: (107-153)/(67-84) 139/81    Flowsheet Rows      First Filed Value   Admission Height  --   Admission Weight  49.9 kg (110 lb) Documented at 11/14/2020 1745              PHYSICAL EXAMINATION:    Physical Exam   VS: As above    Gen: pt alert, comfortably lying in bed, having no pain or difficulty breathing   HEENT: Normocephalic. PERRL, no scleral icterus.   CV: RRR, S1+ and S2+. No murmur, rubs or gallops appreciated.   Pul: Clear to auscultation, no rales or rhonchi or rubs.    Abdm: bowel sounds present, abdomen soft, non-distended.  Extr: no edema, cyanosis or clubbing    MSK: Muscle tone increased muscle strength diminished   Neuro: pt is alert but noncommunicative. Increased muscle tone   Skin: Warm and dry. Not diaphoretic         Results Review:     I reviewed the patient's new clinical results.    Lab Results (last 24 hours)     Procedure Component Value Units Date/Time    Procalcitonin [[id number]]  (Normal) Collected: 11/15/20 0333    Specimen:  Blood Updated: 11/15/20 0557     Procalcitonin 0.04 ng/mL     Narrative:      Results may be falsely decreased if patient taking Biotin.     Comprehensive Metabolic Panel [[id number]] Collected: 11/15/20 0333    Specimen: Blood Updated: 11/15/20 0530     Glucose 84 mg/dL      BUN 21 mg/dL      Creatinine 1.08 mg/dL      Sodium 141 mmol/L      Potassium 4.3 mmol/L      Chloride 106 mmol/L      CO2 23.0 mmol/L      Calcium 9.4 mg/dL      Total Protein 6.1 g/dL      Albumin 3.80 g/dL      ALT (SGPT) 7 U/L      AST (SGOT) 16 U/L      Alkaline Phosphatase 73 U/L      Total Bilirubin 0.4 mg/dL      eGFR Non African Amer 69 mL/min/1.73      Globulin 2.3 gm/dL      A/G Ratio 1.7 g/dL      BUN/Creatinine Ratio 19.4     Anion Gap 12.0 mmol/L     Narrative:      GFR Normal >60  Chronic Kidney Disease <60  Kidney Failure <15      Lipid Panel [[id number]]  (Abnormal) Collected: 11/15/20 0333    Specimen: Blood Updated: 11/15/20 0530     Total Cholesterol 110 mg/dL      Triglycerides 63 mg/dL      HDL Cholesterol 38 mg/dL      LDL Cholesterol  58 mg/dL      VLDL Cholesterol 14 mg/dL      LDL/HDL Ratio 1.56    Narrative:      Cholesterol Reference Ranges  (U.S. Department of Health and Human Services ATP III Classifications)    Desirable          <200 mg/dL  Borderline High    200-239 mg/dL  High Risk          >240 mg/dL      Triglyceride Reference Ranges  (U.S. Department of Health and Human Services ATP III Classifications)    Normal           <150 mg/dL  Borderline High  150-199 mg/dL  High             200-499 mg/dL  Very High        >500 mg/dL    HDL Reference Ranges  (U.S. Department of Health and Human Services ATP III Classifcations)    Low     <40 mg/dl (major risk factor for CHD)  High    >60 mg/dl ('negative' risk factor for CHD)        LDL Reference Ranges  (U.S. Department of Health and Human Services ATP III Classifcations)    Optimal          <100 mg/dL  Near Optimal     100-129 mg/dL  Borderline High  130-159  mg/dL  High             160-189 mg/dL  Very High        >189 mg/dL    Magnesium [[id number]]  (Normal) Collected: 11/15/20 0333    Specimen: Blood Updated: 11/15/20 0530     Magnesium 2.1 mg/dL     Phosphorus [[id number]]  (Normal) Collected: 11/15/20 0333    Specimen: Blood Updated: 11/15/20 0530     Phosphorus 2.8 mg/dL     CBC Auto Differential [[id number]]  (Abnormal) Collected: 11/15/20 0333    Specimen: Blood Updated: 11/15/20 0458     WBC 3.94 10*3/mm3      RBC 4.26 10*6/mm3      Hemoglobin 12.1 g/dL      Hematocrit 38.6 %      MCV 90.6 fL      MCH 28.4 pg      MCHC 31.3 g/dL      RDW 12.6 %      RDW-SD 41.5 fl      MPV 8.4 fL      Platelets 328 10*3/mm3      Neutrophil % 64.2 %      Lymphocyte % 22.1 %      Monocyte % 10.4 %      Eosinophil % 2.3 %      Basophil % 1.0 %      Immature Grans % 0.0 %      Neutrophils, Absolute 2.53 10*3/mm3      Lymphocytes, Absolute 0.87 10*3/mm3      Monocytes, Absolute 0.41 10*3/mm3      Eosinophils, Absolute 0.09 10*3/mm3      Basophils, Absolute 0.04 10*3/mm3      Immature Grans, Absolute 0.00 10*3/mm3     Lactic Acid, Plasma [[id number]]  (Normal) Collected: 11/15/20 0333    Specimen: Blood Updated: 11/15/20 0407     Lactate 0.5 mmol/L     Troponin [[id number]]  (Abnormal) Collected: 11/14/20 1823    Specimen: Blood Updated: 11/14/20 1853     Troponin T 0.059 ng/mL     Narrative:      Troponin T Reference Range:  <= 0.03 ng/mL-   Negative for AMI  >0.03 ng/mL-     Abnormal for myocardial necrosis.  Clinicians would have to utilize clinical acumen, EKG, Troponin and serial changes to determine if it is an Acute Myocardial Infarction or myocardial injury due to an underlying chronic condition.       Results may be falsely decreased if patient taking Biotin.      Lactic Acid, Reflex [[id number]]  (Normal) Collected: 11/14/20 1828    Specimen: Blood Updated: 11/14/20 1846     Lactate 0.9 mmol/L     Lactic Acid, Reflex Timer (This will reflex a repeat order 3-3:15 hours after  ordered.) [[id number]] Collected: 11/14/20 1412    Specimen: Blood Updated: 11/14/20 1815     Hold Tube Hold for add-ons.     Comment: Auto resulted.       Troponin [[id number]]  (Abnormal) Collected: 11/14/20 1623    Specimen: Blood Updated: 11/14/20 1649     Troponin T 0.063 ng/mL     Narrative:      Troponin T Reference Range:  <= 0.03 ng/mL-   Negative for AMI  >0.03 ng/mL-     Abnormal for myocardial necrosis.  Clinicians would have to utilize clinical acumen, EKG, Troponin and serial changes to determine if it is an Acute Myocardial Infarction or myocardial injury due to an underlying chronic condition.       Results may be falsely decreased if patient taking Biotin.      aPTT [[id number]]  (Normal) Collected: 11/14/20 1412    Specimen: Blood Updated: 11/14/20 1532     PTT 25.1 seconds     Narrative:      PTT = The equivalent PTT values for the therapeutic range of heparin levels at 0.1 to 0.7 U/ml are 53 to 110 seconds.      Protime-INR [[id number]]  (Abnormal) Collected: 11/14/20 1412    Specimen: Blood Updated: 11/14/20 1532     Protime 14.1 Seconds      INR 1.12    Narrative:      Therapeutic Ranges for INR: 2.0-3.0 (PT 20-30)                              2.5-3.5 (PT 25-34)    Urinalysis With Culture If Indicated - Urine, Clean Catch [[id number]]  (Normal) Collected: 11/14/20 1519    Specimen: Urine, Clean Catch Updated: 11/14/20 1528     Color, UA Yellow     Appearance, UA Clear     pH, UA 7.0     Specific Gravity, UA 1.025     Glucose, UA Negative     Ketones, UA Negative     Bilirubin, UA Negative     Blood, UA Negative     Protein, UA Negative     Leuk Esterase, UA Negative     Nitrite, UA Negative     Urobilinogen, UA 1.0 E.U./dL    Narrative:      Urine microscopic not indicated.    Lactate Dehydrogenase [[id number]]  (Abnormal) Collected: 11/14/20 1412    Specimen: Blood Updated: 11/14/20 1510      U/L      Comment: Specimen hemolyzed.  Results may be affected.       Troponin [[id number]]   (Abnormal) Collected: 11/14/20 1412    Specimen: Blood Updated: 11/14/20 1510     Troponin T 0.065 ng/mL     Narrative:      Troponin T Reference Range:  <= 0.03 ng/mL-   Negative for AMI  >0.03 ng/mL-     Abnormal for myocardial necrosis.  Clinicians would have to utilize clinical acumen, EKG, Troponin and serial changes to determine if it is an Acute Myocardial Infarction or myocardial injury due to an underlying chronic condition.       Results may be falsely decreased if patient taking Biotin.      COVID-19,[facility]-HOUSE,Nasal Swab No Transport Media 3-4 HR TAT - Swab, Nasal Cavity [[id number]]  (Normal) Collected: 11/14/20 1416    Specimen: Swab from Nasal Cavity Updated: 11/14/20 1509     COVID19 Not Detected    Narrative:      Fact sheet for providers: https://www.fda.gov/media/844453/download     Fact sheet for patients: https://www.fda.gov/media/822335/download    Comprehensive Metabolic Panel [[id number]]  (Abnormal) Collected: 11/14/20 1412    Specimen: Blood Updated: 11/14/20 1507     Glucose 112 mg/dL      BUN 25 mg/dL      Creatinine 1.39 mg/dL      Sodium 141 mmol/L      Potassium 4.5 mmol/L      Comment: Slight hemolysis detected by analyzer. Results may be affected.        Chloride 103 mmol/L      CO2 26.1 mmol/L      Calcium 10.6 mg/dL      Total Protein 7.3 g/dL      Albumin 4.30 g/dL      ALT (SGPT) 8 U/L      AST (SGOT) 22 U/L      Alkaline Phosphatase 87 U/L      Total Bilirubin 0.5 mg/dL      eGFR Non African Amer 52 mL/min/1.73      Globulin 3.0 gm/dL      A/G Ratio 1.4 g/dL      BUN/Creatinine Ratio 18.0     Anion Gap 11.9 mmol/L     Narrative:      GFR Normal >60  Chronic Kidney Disease <60  Kidney Failure <15      Ferritin [[id number]]  (Normal) Collected: 11/14/20 1412    Specimen: Blood Updated: 11/14/20 1507     Ferritin 103.00 ng/mL     Narrative:      Results may be falsely decreased if patient taking Biotin.      Procalcitonin [[id number]]  (Normal) Collected: 11/14/20 1412     Specimen: Blood Updated: 11/14/20 1507     Procalcitonin 0.06 ng/mL     Narrative:      Results may be falsely decreased if patient taking Biotin.     Lactic Acid, Plasma [[id number]]  (Abnormal) Collected: 11/14/20 1412    Specimen: Blood Updated: 11/14/20 1505     Lactate 10.8 mmol/L     D-dimer, Quantitative [[id number]]  (Abnormal) Collected: 11/14/20 1412    Specimen: Blood Updated: 11/14/20 1455     D-Dimer, Quantitative 2.52 MCGFEU/mL     Narrative:      Can be elevated in, but is not diagnostic for deep vein thrombosis (DVT) or pulmonary embolis (PE).  It is also elevated in other medical conditions.  Clinical correlation is required.  The negative cut-off value for the D-Dimer is 0.50 mcg FEU/mL for DVT and PE.      CBC & Differential [[id number]]  (Abnormal) Collected: 11/14/20 1412    Specimen: Blood Updated: 11/14/20 1439    Narrative:      The following orders were created for panel order CBC & Differential.  Procedure                               Abnormality         Status                     ---------                               -----------         ------                     CBC Auto Differential[[id number]]        Abnormal            Final result                 Please view results for these tests on the individual orders.    CBC Auto Differential [[id number]]  (Abnormal) Collected: 11/14/20 1412    Specimen: Blood Updated: 11/14/20 1439     WBC 6.48 10*3/mm3      RBC 4.88 10*6/mm3      Hemoglobin 13.7 g/dL      Hematocrit 44.3 %      MCV 90.8 fL      MCH 28.1 pg      MCHC 30.9 g/dL      RDW 12.8 %      RDW-SD 41.8 fl      MPV 8.8 fL      Platelets 355 10*3/mm3      Neutrophil % 73.6 %      Lymphocyte % 15.4 %      Monocyte % 8.5 %      Eosinophil % 1.7 %      Basophil % 0.5 %      Immature Grans % 0.3 %      Neutrophils, Absolute 4.77 10*3/mm3      Lymphocytes, Absolute 1.00 10*3/mm3      Monocytes, Absolute 0.55 10*3/mm3      Eosinophils, Absolute 0.11 10*3/mm3      Basophils, Absolute 0.03 10*3/mm3       Immature Grans, Absolute 0.02 10*3/mm3      nRBC 0.0 /100 WBC           Imaging Results (Last 24 Hours)     Procedure Component Value Units Date/Time    CT Angiogram Chest [[id number]] Collected: 11/14/20 1619     Updated: 11/14/20 1622    Narrative:      PROCEDURE: CTA Chest    COMPARISON: No relevant comparison or correlation studies available at time of dictation.     INDICATIONS: PE suspected, low/intermediate prob, positive D-dimer;Acidosis;Acute kidney failure, unspecified  ;Fever, lethargy, anorexia x's about 2 days per care giver.  Elevated D-dimer.; Pt  Is mentally handicapped from [employer].;No chest  surgeries.    TECHNIQUE:     CTA of the chest is performed with IV contrast. Multiplanar MIP and 3-D reconstructions  images are performed on separate workstation under concurrent radiologist supervision per protocol and reviewed.  Radiation dose reduction techniques included automated exposure control or exposure modulation based on body size. Count of known CT and cardiac nuc med studies performed in previous 12 months: 1.          FINDINGS: Examination hindered due to patient's contracted state and mild kyphosis and arms being within the gantry. There is also some respiratory motion hindering assessment for small pulmonary nodules.   Study optimized for evaluation of the pulmonary arteries for pulmonary embolus.    No filling defects seen within the pulmonary arterial tree to indicate acute pulmonary embolus.  Lungs are grossly clear, no focal consolidation seen.  A couple of small peripheral subpleural pulmonary nodule suggested, one of the largest in the left lower lobe estimated at approximately 4 mm average diameter. No focal infiltrate seen. Debris noted  within the trachea. Esophageal wall prominence is nonspecific. Marked atrophy of the left kidney with large upper pole renal calculus measuring 10 mm seen with cystic changes and decreased enhancement compared to the right kidney.    No gross  acute bony abnormality seen.         Impression:      1. Study limited due to artifact but no evidence of PE.      2. Questionable small pulmonary nodules in this 64-year-old gentleman. Unfortunately, there is respiratory motion artifact. Recommend 6 month follow-up chest CT.  3. Marked atrophy of the left kidney with cystic changes and large upper pole calculus.       Signer Name: Marycarmen Rivera MD   Signed: 11/14/2020 4:19 PM   Workstation Name: [facility]    [facility]          Xray reviewed personally by physician.      ECG reviewed personally by physician  ECG/EMG Results (most recent)     Procedure Component Value Units Date/Time    ECG 12 Lead [[id number]] Collected: 11/14/20 1359     Updated: 11/14/20 1747     QT Interval 337 ms     Narrative:      HEART RATE= 99  bpm  RR Interval= 604  ms  ME Interval= 121  ms  P Horizontal Axis= -2  deg  P Front Axis= 59  deg  QRSD Interval= 87  ms  QT Interval= 337  ms  QRS Axis= -49  deg  T Wave Axis= 60  deg  - NORMAL ECG -  Sinus rhythm  When compared with ECG of 04-Dec-2018 12:21:54,  No significant change  Electronically Signed By: Burke Garcia ([facility]) 14-Nov-2020 17:45:03  Date and Time of Study: 2020-11-14 13:59:52          Medication Review:   I have reviewed the patient's current medication list    Current Facility-Administered Medications:   •  acetaminophen (TYLENOL) tablet 650 mg, 650 mg, Oral, Q6H PRN, Juan Reyes MD  •  docusate sodium (COLACE) capsule 100 mg, 100 mg, Oral, BID, Juan Reyes MD, 100 mg at 11/14/20 2044  •  donepezil (ARICEPT) tablet 5 mg, 5 mg, Oral, Nightly, Juan Reyes MD, 5 mg at 11/14/20 2044  •  enoxaparin (LOVENOX) syringe 40 mg, 40 mg, Subcutaneous, Q24H, Juan Reyes MD, 40 mg at 11/14/20 2044  •  escitalopram (LEXAPRO) tablet 20 mg, 20 mg, Oral, Daily, Juan Reyes MD  •  losartan (COZAAR) tablet 50 mg, 50 mg, Oral, Daily, Juan Reyes MD  •  memantine (NAMENDA) tablet 5 mg, 5 mg,  Oral, Q12H, Juan Reyes MD, 5 mg at 11/15/20 0942  •  multivitamin with minerals 1 tablet, 1 tablet, Oral, Daily, Juan Reyes MD  •  pantoprazole (PROTONIX) EC tablet 40 mg, 40 mg, Oral, QAM, Juan Reyes MD, 40 mg at 11/15/20 0636  •  pravastatin (PRAVACHOL) tablet 10 mg, 10 mg, Oral, Daily, Juan Reyes MD  •  QUEtiapine fumarate ER (SEROquel XR) tablet 150 mg, 150 mg, Oral, Nightly, Juan Reyes MD, 150 mg at 11/14/20 2044  •  sodium chloride 0.9 % flush 10 mL, 10 mL, Intravenous, PRN, Juan Reyes MD  •  sodium chloride 0.9 % flush 10 mL, 10 mL, Intravenous, Q12H, Juan Reyes MD, 10 mL at 11/14/20 2045  •  sodium chloride 0.9 % infusion 40 mL, 40 mL, Intravenous, PRN, Juan Reyes MD  •  sodium chloride 0.9 % infusion, 125 mL/hr, Intravenous, Continuous, Juan Reyes MD, Last Rate: 125 mL/hr at 11/15/20 0808, 125 mL/hr at 11/15/20 0808      Assessment/Plan           PLAN:    -Labs and diagnostic tests reviewed: YES    -Diagnostic tests reviewed:YES    -Consultations: N/A    -Any new recommendations: To continue current management    -New Labs ordered: CBC, CMP    -New diagnostic tests ordered: N/A    -Any changes in medications:  MEDICATION CHANGES:   New Medications added: AS per order sheet   Medication Dose changed:   Medications deleted:    -Placement issues: N/A    -Patient is clinically and hemodynamically stable    -To continue current management and supportive care    -Will follow patient closely    -Nothing new to add for right now    -Discharge planning issues: Patient should be able to go back home once ready for discharge      DIAGNOSES:      PRIMARY DIAGNOSES:          Lactis acidosis- resolved  - unclear etiology   - s/p 1L LR  - continue IV fluids for now   All labs OK today     Elevated troponin   - pt denies any chest pain  - reported history of CAD  - will check echocardiogram and EKG  Cardiology consult requested, although elevated troponin is most likely  secondary to worsening renal function  EKG sinus rhythm with no acute changes    Elevated DDimer: DDimer 2.52. CT Angio chest negative for PE     ? Pulmonary nodule  - needs repeat Chest CT in 6 months      Dementia with developmental delay/ anxiety  - continue donepezil, memantine, escitalopram, Seroquel      HTN  - continue losartan, statin      GERD  - continue ppi      CKD: Serum creatinine 1.39 with GFR 52 (G3). CT scan chest showed: Marked atrophy of the left kidney with cystic changes and large upper pole calculus.      Anthropometric Analysis: BMI:  ?      CODE Status: FULL CODE/ DNR    Tobacco use: N/A    Alcohol intake: N/A    Illegal Drug use: N/A    DVT Prophylaxis: Inj Enoxaparin and SCDs          ?     SECONDARY DIAGNOSES:  ?     As above      SURGICAL DIAGNOSES:      As per Problem List      TODAY'S DISCHARGE:        N/A          Plan for disposition: Patient should be able to go back to Home/NH once ready for discharge    Juan Reyes MD  11/15/20  11:24 EST            Juan Reyes M.D.; MS; FACP; MPH; COLEEN  Internal Medicine/ Hospitalist          Time:  30  minutes

## 2020-11-15 NOTE — PLAN OF CARE
Goal Outcome Evaluation:  Plan of Care Reviewed With: patient  Progress: improving  Outcome Summary: VSS. No complaints of pain or discomfot. Rested during the night.

## 2020-11-16 ENCOUNTER — APPOINTMENT (OUTPATIENT)
Dept: CARDIOLOGY | Facility: HOSPITAL | Age: 65
End: 2020-11-16

## 2020-11-16 LAB
BH CV ECHO MEAS - AO MAX PG: 5 MMHG
BH CV ECHO MEAS - AO MEAN PG (FULL): 1 MMHG
BH CV ECHO MEAS - AO MEAN PG: 3 MMHG
BH CV ECHO MEAS - AO ROOT AREA (BSA CORRECTED): 2
BH CV ECHO MEAS - AO ROOT AREA: 7.1 CM^2
BH CV ECHO MEAS - AO ROOT DIAM: 3 CM
BH CV ECHO MEAS - AO V2 MAX: 112 CM/SEC
BH CV ECHO MEAS - AO V2 MEAN: 82.2 CM/SEC
BH CV ECHO MEAS - AO V2 VTI: 21.5 CM
BH CV ECHO MEAS - ASC AORTA: 2.7 CM
BH CV ECHO MEAS - AVA(I,A): 2.7 CM^2
BH CV ECHO MEAS - AVA(I,D): 2.7 CM^2
BH CV ECHO MEAS - BSA(HAYCOCK): 1.5 M^2
BH CV ECHO MEAS - BSA: 1.5 M^2
BH CV ECHO MEAS - BZI_BMI: 18.3 KILOGRAMS/M^2
BH CV ECHO MEAS - BZI_METRIC_HEIGHT: 165.1 CM
BH CV ECHO MEAS - BZI_METRIC_WEIGHT: 49.9 KG
BH CV ECHO MEAS - EDV(CUBED): 66.9 ML
BH CV ECHO MEAS - EDV(MOD-SP2): 54.1 ML
BH CV ECHO MEAS - EDV(MOD-SP4): 73.6 ML
BH CV ECHO MEAS - EDV(TEICH): 72.5 ML
BH CV ECHO MEAS - EF(CUBED): 77.5 %
BH CV ECHO MEAS - EF(MOD-BP): 73.1 %
BH CV ECHO MEAS - EF(MOD-SP2): 77.6 %
BH CV ECHO MEAS - EF(MOD-SP4): 70.2 %
BH CV ECHO MEAS - EF(TEICH): 70.1 %
BH CV ECHO MEAS - ESV(CUBED): 15.1 ML
BH CV ECHO MEAS - ESV(MOD-SP2): 12.1 ML
BH CV ECHO MEAS - ESV(MOD-SP4): 21.9 ML
BH CV ECHO MEAS - ESV(TEICH): 21.7 ML
BH CV ECHO MEAS - FS: 39.2 %
BH CV ECHO MEAS - IVS/LVPW: 0.97
BH CV ECHO MEAS - IVSD: 0.58 CM
BH CV ECHO MEAS - LAT PEAK E' VEL: 9.4 CM/SEC
BH CV ECHO MEAS - LV DIASTOLIC VOL/BSA (35-75): 48 ML/M^2
BH CV ECHO MEAS - LV MASS(C)D: 65 GRAMS
BH CV ECHO MEAS - LV MASS(C)DI: 42.3 GRAMS/M^2
BH CV ECHO MEAS - LV MAX PG: 4 MMHG
BH CV ECHO MEAS - LV MEAN PG: 2 MMHG
BH CV ECHO MEAS - LV SYSTOLIC VOL/BSA (12-30): 14.3 ML/M^2
BH CV ECHO MEAS - LV V1 MAX: 96 CM/SEC
BH CV ECHO MEAS - LV V1 MEAN: 63.8 CM/SEC
BH CV ECHO MEAS - LV V1 VTI: 20.2 CM
BH CV ECHO MEAS - LVIDD: 4.1 CM
BH CV ECHO MEAS - LVIDS: 2.5 CM
BH CV ECHO MEAS - LVLD AP2: 6.3 CM
BH CV ECHO MEAS - LVLD AP4: 6.6 CM
BH CV ECHO MEAS - LVLS AP2: 4.5 CM
BH CV ECHO MEAS - LVLS AP4: 5.1 CM
BH CV ECHO MEAS - LVOT AREA (M): 2.8 CM^2
BH CV ECHO MEAS - LVOT AREA: 2.8 CM^2
BH CV ECHO MEAS - LVOT DIAM: 1.9 CM
BH CV ECHO MEAS - LVPWD: 0.6 CM
BH CV ECHO MEAS - MED PEAK E' VEL: 8.7 CM/SEC
BH CV ECHO MEAS - MV A MAX VEL: 117 CM/SEC
BH CV ECHO MEAS - MV DEC SLOPE: 484 CM/SEC^2
BH CV ECHO MEAS - MV DEC TIME: 166 SEC
BH CV ECHO MEAS - MV E MAX VEL: 105 CM/SEC
BH CV ECHO MEAS - MV E/A: 0.9
BH CV ECHO MEAS - MV MEAN PG: 3 MMHG
BH CV ECHO MEAS - MV P1/2T MAX VEL: 96.2 CM/SEC
BH CV ECHO MEAS - MV P1/2T: 58.2 MSEC
BH CV ECHO MEAS - MV V2 MEAN: 84.9 CM/SEC
BH CV ECHO MEAS - MV V2 VTI: 22.1 CM
BH CV ECHO MEAS - MVA P1/2T LCG: 2.3 CM^2
BH CV ECHO MEAS - MVA(P1/2T): 3.8 CM^2
BH CV ECHO MEAS - MVA(VTI): 2.6 CM^2
BH CV ECHO MEAS - RAP SYSTOLE: 3 MMHG
BH CV ECHO MEAS - RVSP: 24 MMHG
BH CV ECHO MEAS - SI(AO): 99.1 ML/M^2
BH CV ECHO MEAS - SI(CUBED): 33.8 ML/M^2
BH CV ECHO MEAS - SI(LVOT): 37.3 ML/M^2
BH CV ECHO MEAS - SI(MOD-SP2): 27.4 ML/M^2
BH CV ECHO MEAS - SI(MOD-SP4): 33.7 ML/M^2
BH CV ECHO MEAS - SI(TEICH): 33.1 ML/M^2
BH CV ECHO MEAS - SV(AO): 152 ML
BH CV ECHO MEAS - SV(CUBED): 51.9 ML
BH CV ECHO MEAS - SV(LVOT): 57.3 ML
BH CV ECHO MEAS - SV(MOD-SP2): 42 ML
BH CV ECHO MEAS - SV(MOD-SP4): 51.7 ML
BH CV ECHO MEAS - SV(TEICH): 50.9 ML
BH CV ECHO MEAS - TAPSE (>1.6): 2 CM
BH CV ECHO MEAS - TR MAX VEL: 227.5 CM/SEC
BH CV ECHO MEASUREMENTS AVERAGE E/E' RATIO: 11.6
BH CV XLRA - RV BASE: 3.1 CM
LEFT ATRIUM VOLUME INDEX: 17 ML/M2
MAXIMAL PREDICTED HEART RATE: 158 BPM
QT INTERVAL: 366 MS
STRESS TARGET HR: 134 BPM

## 2020-11-16 PROCEDURE — 93005 ELECTROCARDIOGRAM TRACING: CPT | Performed by: INTERNAL MEDICINE

## 2020-11-16 PROCEDURE — 93306 TTE W/DOPPLER COMPLETE: CPT | Performed by: INTERNAL MEDICINE

## 2020-11-16 PROCEDURE — 94799 UNLISTED PULMONARY SVC/PX: CPT

## 2020-11-16 PROCEDURE — 25010000002 ENOXAPARIN PER 10 MG: Performed by: INTERNAL MEDICINE

## 2020-11-16 PROCEDURE — 93306 TTE W/DOPPLER COMPLETE: CPT

## 2020-11-16 PROCEDURE — 99233 SBSQ HOSP IP/OBS HIGH 50: CPT | Performed by: INTERNAL MEDICINE

## 2020-11-16 PROCEDURE — 93010 ELECTROCARDIOGRAM REPORT: CPT | Performed by: INTERNAL MEDICINE

## 2020-11-16 RX ADMIN — MEMANTINE HYDROCHLORIDE 5 MG: 5 TABLET ORAL at 08:16

## 2020-11-16 RX ADMIN — QUETIAPINE FUMARATE 150 MG: 50 TABLET, EXTENDED RELEASE ORAL at 20:12

## 2020-11-16 RX ADMIN — MEMANTINE HYDROCHLORIDE 5 MG: 5 TABLET ORAL at 20:12

## 2020-11-16 RX ADMIN — ENOXAPARIN SODIUM 40 MG: 40 INJECTION SUBCUTANEOUS at 18:23

## 2020-11-16 RX ADMIN — DOCUSATE SODIUM 100 MG: 100 CAPSULE ORAL at 08:16

## 2020-11-16 RX ADMIN — PANTOPRAZOLE SODIUM 40 MG: 40 TABLET, DELAYED RELEASE ORAL at 08:16

## 2020-11-16 RX ADMIN — DONEPEZIL HYDROCHLORIDE 5 MG: 5 TABLET ORAL at 20:12

## 2020-11-16 RX ADMIN — ESCITALOPRAM OXALATE 20 MG: 10 TABLET, FILM COATED ORAL at 08:16

## 2020-11-16 RX ADMIN — LOSARTAN POTASSIUM 50 MG: 50 TABLET, FILM COATED ORAL at 08:16

## 2020-11-16 RX ADMIN — SODIUM CHLORIDE 125 ML/HR: 9 INJECTION, SOLUTION INTRAVENOUS at 04:58

## 2020-11-16 RX ADMIN — DOCUSATE SODIUM 100 MG: 100 CAPSULE ORAL at 20:12

## 2020-11-16 RX ADMIN — PRAVASTATIN SODIUM 10 MG: 20 TABLET ORAL at 08:16

## 2020-11-16 RX ADMIN — MULTIPLE VITAMINS W/ MINERALS TAB 1 TABLET: TAB at 08:16

## 2020-11-16 NOTE — PLAN OF CARE
Goal Outcome Evaluation:  Plan of Care Reviewed With: patient  Progress: no change  Outcome Summary: refused PO meds last night. continue to monitor

## 2020-11-16 NOTE — PLAN OF CARE
Goal Outcome Evaluation:  Plan of Care Reviewed With: patient  Progress: no change  Outcome Summary: Refused to eat breakfast and lunch. Refused to take oral medications this morning. SCDs applied. Frequent position changes. continue IVFs

## 2020-11-17 LAB
ALBUMIN SERPL-MCNC: 2.9 G/DL (ref 3.5–5.2)
ALBUMIN/GLOB SERPL: 1.2 G/DL
ALP SERPL-CCNC: 61 U/L (ref 39–117)
ALT SERPL W P-5'-P-CCNC: 6 U/L (ref 1–41)
ANION GAP SERPL CALCULATED.3IONS-SCNC: 12 MMOL/L (ref 5–15)
AST SERPL-CCNC: 18 U/L (ref 1–40)
BASOPHILS # BLD AUTO: 0.03 10*3/MM3 (ref 0–0.2)
BASOPHILS NFR BLD AUTO: 0.6 % (ref 0–1.5)
BILIRUB SERPL-MCNC: 0.4 MG/DL (ref 0–1.2)
BUN SERPL-MCNC: 18 MG/DL (ref 8–23)
BUN/CREAT SERPL: 20.7 (ref 7–25)
CALCIUM SPEC-SCNC: 8.6 MG/DL (ref 8.6–10.5)
CHLORIDE SERPL-SCNC: 111 MMOL/L (ref 98–107)
CO2 SERPL-SCNC: 19 MMOL/L (ref 22–29)
CREAT SERPL-MCNC: 0.87 MG/DL (ref 0.76–1.27)
DEPRECATED RDW RBC AUTO: 41.6 FL (ref 37–54)
EOSINOPHIL # BLD AUTO: 0.16 10*3/MM3 (ref 0–0.4)
EOSINOPHIL NFR BLD AUTO: 3.3 % (ref 0.3–6.2)
ERYTHROCYTE [DISTWIDTH] IN BLOOD BY AUTOMATED COUNT: 12.9 % (ref 12.3–15.4)
GFR SERPL CREATININE-BSD FRML MDRD: 89 ML/MIN/1.73
GLOBULIN UR ELPH-MCNC: 2.5 GM/DL
GLUCOSE BLDC GLUCOMTR-MCNC: 60 MG/DL (ref 70–130)
GLUCOSE SERPL-MCNC: 58 MG/DL (ref 65–99)
HBA1C MFR BLD: 5.22 % (ref 4.8–5.6)
HCT VFR BLD AUTO: 35.7 % (ref 37.5–51)
HGB BLD-MCNC: 11.2 G/DL (ref 13–17.7)
IMM GRANULOCYTES # BLD AUTO: 0.01 10*3/MM3 (ref 0–0.05)
IMM GRANULOCYTES NFR BLD AUTO: 0.2 % (ref 0–0.5)
LYMPHOCYTES # BLD AUTO: 0.89 10*3/MM3 (ref 0.7–3.1)
LYMPHOCYTES NFR BLD AUTO: 18.6 % (ref 19.6–45.3)
MAGNESIUM SERPL-MCNC: 1.8 MG/DL (ref 1.6–2.4)
MCH RBC QN AUTO: 28.4 PG (ref 26.6–33)
MCHC RBC AUTO-ENTMCNC: 31.4 G/DL (ref 31.5–35.7)
MCV RBC AUTO: 90.4 FL (ref 79–97)
MONOCYTES # BLD AUTO: 0.42 10*3/MM3 (ref 0.1–0.9)
MONOCYTES NFR BLD AUTO: 8.8 % (ref 5–12)
NEUTROPHILS NFR BLD AUTO: 3.28 10*3/MM3 (ref 1.7–7)
NEUTROPHILS NFR BLD AUTO: 68.5 % (ref 42.7–76)
NRBC BLD AUTO-RTO: 0 /100 WBC (ref 0–0.2)
PHOSPHATE SERPL-MCNC: 2.2 MG/DL (ref 2.5–4.5)
PLATELET # BLD AUTO: 257 10*3/MM3 (ref 140–450)
PMV BLD AUTO: 8.5 FL (ref 6–12)
POTASSIUM SERPL-SCNC: 3.9 MMOL/L (ref 3.5–5.2)
PROCALCITONIN SERPL-MCNC: 0.07 NG/ML (ref 0–0.25)
PROT SERPL-MCNC: 5.4 G/DL (ref 6–8.5)
RBC # BLD AUTO: 3.95 10*6/MM3 (ref 4.14–5.8)
SODIUM SERPL-SCNC: 142 MMOL/L (ref 136–145)
T4 FREE SERPL-MCNC: 1.07 NG/DL (ref 0.93–1.7)
TROPONIN T SERPL-MCNC: 0.02 NG/ML (ref 0–0.03)
TSH SERPL DL<=0.05 MIU/L-ACNC: 4.47 UIU/ML (ref 0.27–4.2)
WBC # BLD AUTO: 4.79 10*3/MM3 (ref 3.4–10.8)

## 2020-11-17 PROCEDURE — 84443 ASSAY THYROID STIM HORMONE: CPT | Performed by: INTERNAL MEDICINE

## 2020-11-17 PROCEDURE — 84145 PROCALCITONIN (PCT): CPT | Performed by: INTERNAL MEDICINE

## 2020-11-17 PROCEDURE — 25010000002 ENOXAPARIN PER 10 MG: Performed by: INTERNAL MEDICINE

## 2020-11-17 PROCEDURE — 83036 HEMOGLOBIN GLYCOSYLATED A1C: CPT | Performed by: INTERNAL MEDICINE

## 2020-11-17 PROCEDURE — 99232 SBSQ HOSP IP/OBS MODERATE 35: CPT | Performed by: INTERNAL MEDICINE

## 2020-11-17 PROCEDURE — 84484 ASSAY OF TROPONIN QUANT: CPT | Performed by: INTERNAL MEDICINE

## 2020-11-17 PROCEDURE — 94799 UNLISTED PULMONARY SVC/PX: CPT

## 2020-11-17 PROCEDURE — 80053 COMPREHEN METABOLIC PANEL: CPT | Performed by: INTERNAL MEDICINE

## 2020-11-17 PROCEDURE — 83735 ASSAY OF MAGNESIUM: CPT | Performed by: INTERNAL MEDICINE

## 2020-11-17 PROCEDURE — 84100 ASSAY OF PHOSPHORUS: CPT | Performed by: INTERNAL MEDICINE

## 2020-11-17 PROCEDURE — 84439 ASSAY OF FREE THYROXINE: CPT | Performed by: INTERNAL MEDICINE

## 2020-11-17 PROCEDURE — 85025 COMPLETE CBC W/AUTO DIFF WBC: CPT | Performed by: INTERNAL MEDICINE

## 2020-11-17 PROCEDURE — 82962 GLUCOSE BLOOD TEST: CPT

## 2020-11-17 RX ORDER — DEXTROSE AND SODIUM CHLORIDE 5; .9 G/100ML; G/100ML
125 INJECTION, SOLUTION INTRAVENOUS CONTINUOUS
Status: DISCONTINUED | OUTPATIENT
Start: 2020-11-17 | End: 2020-11-18

## 2020-11-17 RX ADMIN — QUETIAPINE FUMARATE 150 MG: 50 TABLET, EXTENDED RELEASE ORAL at 20:24

## 2020-11-17 RX ADMIN — LOSARTAN POTASSIUM 50 MG: 50 TABLET, FILM COATED ORAL at 09:12

## 2020-11-17 RX ADMIN — DOCUSATE SODIUM 100 MG: 100 CAPSULE ORAL at 09:12

## 2020-11-17 RX ADMIN — PRAVASTATIN SODIUM 10 MG: 20 TABLET ORAL at 09:12

## 2020-11-17 RX ADMIN — DOCUSATE SODIUM 100 MG: 100 CAPSULE ORAL at 20:25

## 2020-11-17 RX ADMIN — DEXTROSE AND SODIUM CHLORIDE 125 ML/HR: 5; 900 INJECTION, SOLUTION INTRAVENOUS at 08:13

## 2020-11-17 RX ADMIN — MULTIPLE VITAMINS W/ MINERALS TAB 1 TABLET: TAB at 09:12

## 2020-11-17 RX ADMIN — DONEPEZIL HYDROCHLORIDE 5 MG: 5 TABLET ORAL at 20:25

## 2020-11-17 RX ADMIN — MEMANTINE HYDROCHLORIDE 5 MG: 5 TABLET ORAL at 20:25

## 2020-11-17 RX ADMIN — PANTOPRAZOLE SODIUM 40 MG: 40 TABLET, DELAYED RELEASE ORAL at 09:12

## 2020-11-17 RX ADMIN — ESCITALOPRAM OXALATE 20 MG: 10 TABLET, FILM COATED ORAL at 09:12

## 2020-11-17 RX ADMIN — ENOXAPARIN SODIUM 40 MG: 40 INJECTION SUBCUTANEOUS at 20:28

## 2020-11-17 RX ADMIN — SODIUM CHLORIDE, PRESERVATIVE FREE 10 ML: 5 INJECTION INTRAVENOUS at 09:13

## 2020-11-17 RX ADMIN — MEMANTINE HYDROCHLORIDE 5 MG: 5 TABLET ORAL at 09:12

## 2020-11-17 RX ADMIN — POTASSIUM & SODIUM PHOSPHATES POWDER PACK 280-160-250 MG 1 PACKET: 280-160-250 PACK at 20:24

## 2020-11-17 RX ADMIN — DEXTROSE AND SODIUM CHLORIDE 125 ML/HR: 5; 900 INJECTION, SOLUTION INTRAVENOUS at 16:49

## 2020-11-17 RX ADMIN — POTASSIUM & SODIUM PHOSPHATES POWDER PACK 280-160-250 MG 1 PACKET: 280-160-250 PACK at 14:16

## 2020-11-17 NOTE — PLAN OF CARE
Goal Outcome Evaluation:  Plan of Care Reviewed With: patient, caregiver  Progress: no change  Outcome Summary: caregiver in tohelp with meds. PT/OT eval put in. Ensure ordered. Contracture brace is to be put on tomorrow morning after bath and then off for 2 hours then on for 2 hours and so on. but should always be off at HS. They said that patient will ambulate with assist x2 and will sit up in chair.

## 2020-11-17 NOTE — PROGRESS NOTES
"Adult Nutrition  Assessment/PES    Patient Name:  Arian Persaud  YOB: 1955  MRN: [id number]  Admit Date:  11/14/2020    Assessment Date:  11/16/2020    Comments:  Will follow tolerance and intake of meals.     Reason for Assessment     Row Name 11/16/20 2245          Reason for Assessment    Diagnosis  -- lactic acidosis, congenital cognitive defect with dementia     Identified At Risk by Screening Criteria  MST SCORE 2+         Nutrition/Diet History     Row Name 11/16/20 2246          Nutrition/Diet History    Factors Affecting Nutritional Intake  -- per nursing, no appetite, no sitters available, pt sleeping when visited         Anthropometrics     Row Name 11/16/20 2255          Anthropometrics    Height  -- 165.1cm (65\")     Weight  -- 110# 11.15.20        Body Mass Index (BMI)    BMI Assessment  BMI 17-18.4: protein-energy malnutrition grade I         Labs/Tests/Procedures/Meds     Row Name 11/16/20 2307          Labs/Procedures/Meds    Lab Results Reviewed  reviewed        Medications    Pertinent Medications Reviewed  reviewed     Pertinent Medications Comments  MVI with minerals, IV fluids at 125 ml/hour         Physical Findings     Row Name 11/16/20 2308          Physical Findings    Overall Physical Appearance  -- pt sleeping soundly when visited by RD earlier in day         Estimated/Assessed Needs     Row Name 11/16/20 2309 11/16/20 2255       Calculation Measurements    Height  --  -- 165.1cm (65\")       Estimated/Assessed Needs    Additional Documentation  Norma Geiger Equation (Group);Protein Requirements (Group);Fluid Requirements (Group);Calorie Requirements (Group)  --       Calorie Requirements    Estimated Calorie Need Method  Lu Geiger 1.4-1.5 activity factors  --    Estimated Calorie Requirement Comment  1,780-1,840  --       Protein Requirements    Est Protein Requirement Amount (gms/kg)  1.5 gm protein 75 grams  --       Fluid Requirements    Estimated Fluid " Requirement Method  RDA Method 1,251-1,269  --        Nutrition Prescription Ordered     Row Name 11/16/20 2310          Nutrition Prescription PO    Current PO Diet  Regular         Evaluation of Received Nutrient/Fluid Intake     Row Name 11/16/20 2311          Fluid Intake Evaluation    Oral Fluid (mL)  -- insufficient data     IV Fluid (mL)  -- IV fluids exceed estimated fluid requirements        PO Evaluation    Number of Meals  2     % PO Intake  25%               Problem/Interventions:  Problem 1     Row Name 11/16/20 2312          Nutrition Diagnoses Problem 1    Problem 1  Predicted Suboptimal Intake     Etiology (related to)  Factors Affecting Nutrition     Signs/Symptoms (evidenced by)  Report/Observation;PO Intake         Problem 2     Row Name 11/16/20 2313          Nutrition Diagnoses Problem 2    Problem 2  Underweight     Etiology (related to)  Factors Affecting Nutrition     Signs/Symptoms (evidenced by)  BMI     BMI  18 - 18.9             Intervention Goal     Row Name 11/16/20 2313          Intervention Goal    PO  Establish PO;PO intake (%)     PO Intake %  50 % or greater of meals     Weight  No significant weight loss         Nutrition Intervention     Row Name 11/16/20 2316          Nutrition Intervention    RD/Tech Action  Follow Tx progress           Education/Evaluation     Row Name 11/16/20 2316          Education    Education  Education not appropriate at this time        Monitor/Evaluation    Monitor  I&O;PO intake;Pertinent labs;Weight;Skin status           Electronically signed by:  Xi Wall RD  11/16/20 23:17 EST

## 2020-11-17 NOTE — PROGRESS NOTES
SERVICE: [facility] HOSPITALIST    CHIEF COMPLAINT: Not feeling well    SUBJECTIVE:    Patient has a congenital cognitive defect, with dementia.  By report lives in a group home, no caregiver at bedside.  Gives appropriate enough history, states that he is feeling better, but does not feel very well.  Reports having chills, and cough, but no abdominal pain or dysuria.    Patient presented with a pretty high lactate that improved with fluids, not given any antibiotics here, med rec appears to indicate that he was on antibiotics prior to admission.    Cardiology saw and signed off due to flat troponin, and normal echo.    OBJECTIVE:    /94 (BP Location: Left arm, Patient Position: Lying)   Pulse 103   Temp 98.7 °F (37.1 °C) (Axillary)   Resp 18   Wt 49.9 kg (110 lb)   SpO2 95%   BMI 18.30 kg/m²     MEDS/LABS REVIEWED AND ORDERED    docusate sodium, 100 mg, Oral, BID  donepezil, 5 mg, Oral, Nightly  enoxaparin, 40 mg, Subcutaneous, Q24H  escitalopram, 20 mg, Oral, Daily  losartan, 50 mg, Oral, Daily  memantine, 5 mg, Oral, Q12H  multivitamin with minerals, 1 tablet, Oral, Daily  pantoprazole, 40 mg, Oral, QAM  pravastatin, 10 mg, Oral, Daily  QUEtiapine XR, 150 mg, Oral, Nightly  sodium chloride, 10 mL, Intravenous, Q12H        Physical Exam  Constitutional:       General: He is not in acute distress.     Appearance: He is ill-appearing.      Comments: Lethargic and tired with crusting around his mouth, will wake up and answer questions, appears acutely ill but not in acute distress   Eyes:      Pupils: Pupils are equal, round, and reactive to light.   Cardiovascular:      Rate and Rhythm: Regular rhythm. Tachycardia present.      Heart sounds: Normal heart sounds. No murmur.   Pulmonary:      Effort: Pulmonary effort is normal. No respiratory distress.      Breath sounds: No wheezing, rhonchi or rales.   Abdominal:      General: Abdomen is flat. There is no distension.      Palpations:  Abdomen is soft.      Tenderness: There is no abdominal tenderness. There is no guarding.   Musculoskeletal:      Comments: B/l trace pitting edema of the le   Skin:     General: Skin is warm and dry.   Neurological:      General: No focal deficit present.      Mental Status: He is disoriented.   Psychiatric:      Comments: Deferred mood, flattened constricted affect         LAB/DIAGNOSTICS:    Lab Results (last 24 hours)     ** No results found for the last 24 hours. **        Results for orders placed during the hospital encounter of 11/14/20   Adult Transthoracic Echo Complete W/ Cont if Necessary Per Protocol    Narrative · Left ventricular ejection fraction appears to be 61 - 65%. Left   ventricular systolic function is normal.  · Left ventricular diastolic function was normal.  · No significant valvular stenosis or regurgitation noted.  · Estimated right ventricular systolic pressure from tricuspid   regurgitation is normal (<35 mmHg). Calculated right ventricular systolic   pressure from tricuspid regurgitation is 24 mmHg.        No radiology results for the last day      ASSESSMENT/PLAN:    Lactic acidosis w/ ISH  -Clinically suspect sepsis, but UA negative, chest CT limited by motion but grossly no signs of pneumonia, and white count has been normal.a left shift  -Lactate was 10 on admission and improved with fluids  - Cr improving, but labs not checked this AM  - Pt appears acutely ill, but no caregiver at bedside, appears to give appropriate history    Essential hypertension  - BP's creeping up today now 150's  -Already back on his home losartan    Baseline Cognitive Deficit  - No CLL caregivers at bedside  -Continued on home meds    Type II NSTEMI  - Cards saw, flat trop, echo normal, feel due to underlying etiology but no one seems to know what that is    Pulmonary nodule  -Needs follow-up chest CT in 6 months    Disposition  -No caregiver at bedside, discharge back to [facility], vitals are  improving, will recheck labs in AM, if still lethagric, will attempt to get understanding of pt's baseline

## 2020-11-18 LAB
ANION GAP SERPL CALCULATED.3IONS-SCNC: 8.2 MMOL/L (ref 5–15)
BASOPHILS # BLD AUTO: 0.03 10*3/MM3 (ref 0–0.2)
BASOPHILS NFR BLD AUTO: 0.6 % (ref 0–1.5)
BUN SERPL-MCNC: 11 MG/DL (ref 8–23)
BUN/CREAT SERPL: 13.1 (ref 7–25)
CALCIUM SPEC-SCNC: 8.5 MG/DL (ref 8.6–10.5)
CHLORIDE SERPL-SCNC: 111 MMOL/L (ref 98–107)
CO2 SERPL-SCNC: 21.8 MMOL/L (ref 22–29)
CREAT SERPL-MCNC: 0.84 MG/DL (ref 0.76–1.27)
DEPRECATED RDW RBC AUTO: 41.2 FL (ref 37–54)
EOSINOPHIL # BLD AUTO: 0.23 10*3/MM3 (ref 0–0.4)
EOSINOPHIL NFR BLD AUTO: 4.4 % (ref 0.3–6.2)
ERYTHROCYTE [DISTWIDTH] IN BLOOD BY AUTOMATED COUNT: 12.8 % (ref 12.3–15.4)
GFR SERPL CREATININE-BSD FRML MDRD: 93 ML/MIN/1.73
GLUCOSE SERPL-MCNC: 136 MG/DL (ref 65–99)
HCT VFR BLD AUTO: 35.7 % (ref 37.5–51)
HGB BLD-MCNC: 11.6 G/DL (ref 13–17.7)
IMM GRANULOCYTES # BLD AUTO: 0.01 10*3/MM3 (ref 0–0.05)
IMM GRANULOCYTES NFR BLD AUTO: 0.2 % (ref 0–0.5)
LYMPHOCYTES # BLD AUTO: 0.96 10*3/MM3 (ref 0.7–3.1)
LYMPHOCYTES NFR BLD AUTO: 18.2 % (ref 19.6–45.3)
MAGNESIUM SERPL-MCNC: 1.7 MG/DL (ref 1.6–2.4)
MCH RBC QN AUTO: 28.6 PG (ref 26.6–33)
MCHC RBC AUTO-ENTMCNC: 32.5 G/DL (ref 31.5–35.7)
MCV RBC AUTO: 87.9 FL (ref 79–97)
MONOCYTES # BLD AUTO: 0.5 10*3/MM3 (ref 0.1–0.9)
MONOCYTES NFR BLD AUTO: 9.5 % (ref 5–12)
NEUTROPHILS NFR BLD AUTO: 3.55 10*3/MM3 (ref 1.7–7)
NEUTROPHILS NFR BLD AUTO: 67.1 % (ref 42.7–76)
PHOSPHATE SERPL-MCNC: 2.3 MG/DL (ref 2.5–4.5)
PLATELET # BLD AUTO: 236 10*3/MM3 (ref 140–450)
PMV BLD AUTO: 8.5 FL (ref 6–12)
POTASSIUM SERPL-SCNC: 3.4 MMOL/L (ref 3.5–5.2)
PROCALCITONIN SERPL-MCNC: 0.06 NG/ML (ref 0–0.25)
RBC # BLD AUTO: 4.06 10*6/MM3 (ref 4.14–5.8)
SODIUM SERPL-SCNC: 141 MMOL/L (ref 136–145)
WBC # BLD AUTO: 5.28 10*3/MM3 (ref 3.4–10.8)

## 2020-11-18 PROCEDURE — 83735 ASSAY OF MAGNESIUM: CPT | Performed by: INTERNAL MEDICINE

## 2020-11-18 PROCEDURE — 97162 PT EVAL MOD COMPLEX 30 MIN: CPT

## 2020-11-18 PROCEDURE — 99232 SBSQ HOSP IP/OBS MODERATE 35: CPT | Performed by: INTERNAL MEDICINE

## 2020-11-18 PROCEDURE — 97166 OT EVAL MOD COMPLEX 45 MIN: CPT

## 2020-11-18 PROCEDURE — 25010000002 ENOXAPARIN PER 10 MG: Performed by: INTERNAL MEDICINE

## 2020-11-18 PROCEDURE — 84100 ASSAY OF PHOSPHORUS: CPT | Performed by: INTERNAL MEDICINE

## 2020-11-18 PROCEDURE — 85025 COMPLETE CBC W/AUTO DIFF WBC: CPT | Performed by: INTERNAL MEDICINE

## 2020-11-18 PROCEDURE — 84145 PROCALCITONIN (PCT): CPT | Performed by: INTERNAL MEDICINE

## 2020-11-18 PROCEDURE — 80048 BASIC METABOLIC PNL TOTAL CA: CPT | Performed by: INTERNAL MEDICINE

## 2020-11-18 RX ORDER — AMPICILLIN AND SULBACTAM 2; 1 G/1; G/1
INJECTION, POWDER, FOR SOLUTION INTRAMUSCULAR; INTRAVENOUS
Status: DISPENSED
Start: 2020-11-18 | End: 2020-11-19

## 2020-11-18 RX ORDER — SODIUM CHLORIDE 9 MG/ML
INJECTION, SOLUTION INTRAVENOUS
Status: DISPENSED
Start: 2020-11-18 | End: 2020-11-19

## 2020-11-18 RX ADMIN — SODIUM CHLORIDE, PRESERVATIVE FREE 10 ML: 5 INJECTION INTRAVENOUS at 21:52

## 2020-11-18 RX ADMIN — QUETIAPINE FUMARATE 150 MG: 50 TABLET, EXTENDED RELEASE ORAL at 21:50

## 2020-11-18 RX ADMIN — MULTIPLE VITAMINS W/ MINERALS TAB 1 TABLET: TAB at 10:49

## 2020-11-18 RX ADMIN — SODIUM CHLORIDE, PRESERVATIVE FREE 10 ML: 5 INJECTION INTRAVENOUS at 10:50

## 2020-11-18 RX ADMIN — MEMANTINE HYDROCHLORIDE 5 MG: 5 TABLET ORAL at 21:50

## 2020-11-18 RX ADMIN — MEMANTINE HYDROCHLORIDE 5 MG: 5 TABLET ORAL at 10:49

## 2020-11-18 RX ADMIN — DOCUSATE SODIUM 100 MG: 100 CAPSULE ORAL at 10:49

## 2020-11-18 RX ADMIN — ESCITALOPRAM OXALATE 20 MG: 10 TABLET, FILM COATED ORAL at 10:49

## 2020-11-18 RX ADMIN — ENOXAPARIN SODIUM 40 MG: 40 INJECTION SUBCUTANEOUS at 21:59

## 2020-11-18 RX ADMIN — LOSARTAN POTASSIUM 50 MG: 50 TABLET, FILM COATED ORAL at 10:49

## 2020-11-18 RX ADMIN — DOCUSATE SODIUM 100 MG: 100 CAPSULE ORAL at 21:50

## 2020-11-18 RX ADMIN — AMPICILLIN SODIUM AND SULBACTAM SODIUM 3 G: 2; 1 INJECTION, POWDER, FOR SOLUTION INTRAMUSCULAR; INTRAVENOUS at 22:58

## 2020-11-18 RX ADMIN — PRAVASTATIN SODIUM 10 MG: 20 TABLET ORAL at 10:56

## 2020-11-18 RX ADMIN — DONEPEZIL HYDROCHLORIDE 5 MG: 5 TABLET ORAL at 21:50

## 2020-11-18 RX ADMIN — PANTOPRAZOLE SODIUM 40 MG: 40 TABLET, DELAYED RELEASE ORAL at 10:48

## 2020-11-18 RX ADMIN — DEXTROSE AND SODIUM CHLORIDE 125 ML/HR: 5; 900 INJECTION, SOLUTION INTRAVENOUS at 00:58

## 2020-11-18 RX ADMIN — POTASSIUM PHOSPHATE, MONOBASIC AND POTASSIUM PHOSPHATE, DIBASIC 15 MMOL: 224; 236 INJECTION, SOLUTION, CONCENTRATE INTRAVENOUS at 11:59

## 2020-11-18 NOTE — PLAN OF CARE
"  Problem: Adult Inpatient Plan of Care  Goal: Plan of Care Review  Recent Flowsheet Documentation  Taken 11/18/2020 0841 by Glory Casper OTR  Plan of Care Reviewed With: patient  Outcome Summary: OT evaluation completed. Patient oriented to name only but did state he was in a hosptial. Attempted to assess patient's UE AROM however patient unable to follow any commands for testing. PROM of shoulders was limited (approximately 70 degrees) and elbows were - degrees passively. Patient did not initiate holding a cup when placed in hand and PROM of hands was limited by tone (approximately 1/2 range). Patient was dependent X 2 for attempted sit to stand from recliner. Per staff report patient more comfortable with familiar caregivers and \"did more for them yesterday when they were here.\" Recommend caregivers continue to encourage self feeding and perform AROM/PROM daily.      "

## 2020-11-18 NOTE — PLAN OF CARE
Goal Outcome Evaluation:  Plan of Care Reviewed With: patient  Progress: no change  Outcome Summary: phosphorus low replaced per protocol.poor apetite. will continue to monitor

## 2020-11-18 NOTE — PLAN OF CARE
Goal Outcome Evaluation:  Plan of Care Reviewed With: patient  Progress: no change  Outcome Summary: Patient VSS, no signs of discomfort or pain noted this shift. Patient took meds with applesauce. Contracture braces off tonight. Resting in bed quietly at this time.

## 2020-11-18 NOTE — THERAPY DISCHARGE NOTE
"Acute Care - Physical Therapy Initial Evaluation/Discharge   Lucina Ulloa     Patient Name: Arian Persaud  : 1955  MRN: [id number]  Today's Date: 2020   Onset of Illness/Injury or Date of Surgery: 20     Referring Physician: Dr. Cummings      Admit Date: 2020    Visit Dx:    ICD-10-CM ICD-9-CM   1. Lactic acidosis  E87.2 276.2   2. Acute kidney injury (CMS/HCC)  N17.9 584.9     Patient Active Problem List   Diagnosis   • Sepsis (CMS/HCC)   • Lactic acidosis   • Mental retardation   • Essential hypertension   • GERD (gastroesophageal reflux disease)   • Elevated troponin   • Dementia (CMS/HCC)     Past Medical History:   Diagnosis Date   • Coronary artery disease    • Injury of back    • Kidney stones    • Mental retardation      Past Surgical History:   Procedure Laterality Date   • KIDNEY SURGERY     • TONSILLECTOMY            PT Assessment (last 12 hours)      PT Evaluation and Treatment     Row Name 20 0840          Physical Therapy Time and Intention    Subjective Information  no complaints  -BP     Document Type  evaluation  -BP     Mode of Treatment  physical therapy  -BP     Patient Effort  fair  -BP     Comment  Patient very difficult to understand. Patient appeared to be in pain with end range stretching of B knees into extension but when asked if he is in pain patient states \"no.\" Patient appears tired throughout evaluation, keeping eyes closed the majoirty of the time.   -BP     Row Name 20 0840          General Information    Patient Profile Reviewed  yes  -BP     Onset of Illness/Injury or Date of Surgery  20  -BP     Referring Physician  Dr. Cummings   -BP     Patient Observations  lethargic;cooperative  -BP     Patient/Family/Caregiver Comments/Observations  Patient sitting in bedside chair. Patient just transferred from bed to chair with assist x 2 from CNA's. Per CNA transfer was dependent. Agreeable to evaluation.   -BP     Prior Level of Function  -- see " pertinent history   -BP     Equipment Currently Used at Home  feeding device per chart review, w/c and rolling walker   -BP     Pertinent History of Current Functional Problem  Patient presents to ED due to worsening cough and weakness. Patient with a developmental display and resides in a group home where he is provided 24/7 care. Caregivers not present during evaluation. All history obtained from chart review and staff. Per chart review, since onset of illness, patient has been a dependent assist of 2 for transfers into a w/c. Per staff and chart review, patient did walk with a walker at one time however time frame unknown as to how long ago he did this, the distance or how much assist was required. Patient with baseline knee flexion contractures.  Unknown if contractures are at baseline or worse since admission. Plan is for patient to return to group home at discharge per .   -BP     Existing Precautions/Restrictions  fall LE contractures.   -BP     Limitations/Impairments  other (see comments);safety/cognitive contractures  -BP     Risks Reviewed  patient:;increased discomfort  -BP     Benefits Reviewed  patient:;improve function  -BP     Barriers to Rehab  previous functional deficit;medically complex;contractures;cognitive status  -BP     Row Name 11/18/20 0840          Previous Level of Function/Home Environm    Household Ambulation, Premorbid Functional Level  needs assistance for safe performance;needs adaptive equipment for safe performance  -BP     Community Ambulation, Premorbid Functional Level  unable to perform  -BP     Row Name 11/18/20 0840          Living Environment    Current Living Arrangements  group home  -BP     Lives With  -- caregivers from group home  -BP     Row Name 11/18/20 0898          Cognition    Orientation Status (Cognition)  oriented to;place reports he is in the hospital. Does not recall birthday.   -BP     Follows Commands (Cognition)  follows one-step  "commands;25-49% accuracy;increased processing time needed;initiation impaired;physical/tactile prompts required;repetition of directions required;verbal cues/prompting required  -     Personal Safety Interventions  gait belt;nonskid shoes/slippers when out of bed  -[facility] Name 11/18/20 0840          Pain    Additional Documentation  Pain Scale: Numbers Pre/Post-Treatment (Group)  -BP     Row Name 11/18/20 0840          Pain Scale: FACES Pre/Post-Treatment    Pre/Posttreatment Pain Comment  Patient stated \"no\" when asked about pain however appears to be in pain with end range stretching of LE's   -[facility] Name 11/18/20 0840          Range of Motion Comprehensive    Comment, General Range of Motion  Patient able to actively DF/PF ankles. He is able to flex bilateral hips in sitting. Patients B knee flexion limited, lacks approximately 45 degrees from full extension. Patient with baseline knee flexion contractures.   -BP     Row Name 11/18/20 0840          Strength Comprehensive (MMT)    Comment, General Manual Muscle Testing (MMT) Assessment  Unable to strength test due to contractures   -BP     Row Name 11/18/20 0840          Bed Mobility    Comment (Bed Mobility)  deferred, patient up in chair.   -BP     Row Name 11/18/20 0840          Transfers    Transfers  sit-stand transfer;stand-sit transfer  -     Comment (Transfers)  Attempted sit to/from stand x 2 from chair. Patient dependent on each trial. Unable to fully extend knees and minimally clears bottom. Patient is dependent for anterior weight shift and scooting in prep for standing.   -BP     Sit-Stand [place] (Transfers)  dependent (less than 25% patient effort);2 person assist;verbal cues  -     Stand-Sit [place] (Transfers)  dependent (less than 25% patient effort);2 person assist;verbal cues  -BP     Row Name 11/18/20 0840          Sit-Stand Transfer    Assistive Device (Sit-Stand Transfers)  walker, front-wheeled  -     Row Name " 11/18/20 0840          Stand-Sit Transfer    Assistive Device (Stand-Sit Transfers)  walker, front-wheeled  -BP     Row Name 11/18/20 0840          Plan of Care Review    Plan of Care Reviewed With  patient  -BP     Outcome Summary  PT Evaluation complete: Patient is oriented to name and location only. He follows approximately 25% of one step commands. Patient able to DF/PF bilateral ankles actively with significant cues and B knees lacks approximately 45 degrees from full extension. Patient unable to provide full history or prior level, information obtained from chart review and staff. Patient with baseline contractures and recently requires assist of two for transfers with use of w/c for mobility. Patient is dependent for anterior weight shift and scooting forward in prep for standing. He is dependent x 2 to attempt standing, unable to extend knees and minimally clears bottom from chair. Per staff, patients mobility is improved when caregivers are present. Recommned continued mobility with nursing staff and caregivers. Recommend home health PT upon return to group home. No further inpatient skilled PT recommendations at this time.   -BP     Row Name 11/18/20 0840          Positioning and Restraints    Pre-Treatment Position  sitting in chair/recliner  -BP     Post Treatment Position  chair  -BP     In Chair  call light within reach;encouraged to call for assist;reclined;notified nsg  -BP     Row Name 11/18/20 0840          Therapy Assessment/Plan (PT)    Criteria for Skilled Interventions Met (PT)  other (see comments) Patient at baseline   -BP     Row Name 11/18/20 0840          PT Evaluation Complexity    History, PT Evaluation Complexity  3 or more personal factors and/or comorbidities  -BP     Examination of Body Systems (PT Eval Complexity)  total of 3 or more elements  -BP     Clinical Presentation (PT Evaluation Complexity)  evolving  -BP     Clinical Decision Making (PT Evaluation Complexity)  moderate  complexity  -BP     Overall Complexity (PT Evaluation Complexity)  moderate complexity  -BP     Row Name 11/18/20 0840          Therapy Plan Review/Discharge Plan (PT)    Therapy Plan Review (PT)  patient;participants included;evaluation/treatment results reviewed;care plan/treatment goals reviewed;risks/benefits reviewed;current/potential barriers reviewed  -BP     Patient/Family Concerns, Anticipated Discharge Disposition (PT)  recommend continued 24/7 care from staff as well as home health physical therapy if possible.   -BP       User Key  (r) = Recorded By, (t) = Taken By, (c) = Cosigned By    Initials Name Provider Type    BP Isabela Liao, PT Physical Therapist          Physical Therapy Education                 Title: PT OT SLP Therapies (Resolved)     Topic: Physical Therapy (Resolved)     Point: Mobility training (Resolved)     Learning Progress Summary           Patient Acceptance, E,TB, NR by  at 11/18/2020 1247                               User Key     Initials Effective Dates Name Provider Type Discipline    BP 04/03/18 -  Isabela Liao, ALINE Physical Therapist PT                PT Recommendation and Plan  Anticipated Discharge Disposition (PT): group home  Therapy Frequency (PT): evaluation only  Plan of Care Reviewed With: patient  Outcome Summary: PT Evaluation complete: Patient is oriented to name and location only. He follows approximately 25% of one step commands. Patient able to DF/PF bilateral ankles actively with significant cues and B knees lacks approximately 45 degrees from full extension. Patient unable to provide full history or prior level, information obtained from chart review and staff. Patient with baseline contractures and recently requires assist of two for transfers with use of w/c for mobility. Patient is dependent for anterior weight shift and scooting forward in prep for standing. He is dependent x 2 to attempt standing, unable to extend knees and minimally clears  bottom from chair. Per staff, patients mobility is improved when caregivers are present. Recommned continued mobility with nursing staff and caregivers. Recommend home health PT upon return to group home. No further inpatient skilled PT recommendations at this time.     Outcome Measures     Row Name 11/18/20 1100 11/18/20 0840          How much help from another person do you currently need...    Turning from your back to your side while in flat bed without using bedrails?  --  1  -BP     Moving from lying on back to sitting on the side of a flat bed without bedrails?  --  1  -BP     Moving to and from a bed to a chair (including a wheelchair)?  --  1  -BP     Standing up from a chair using your arms (e.g., wheelchair, bedside chair)?  --  1  -BP     Climbing 3-5 steps with a railing?  --  1  -BP     To walk in hospital room?  --  1  -BP     AM-PAC 6 Clicks Score (PT)  --  6  -BP        How much help from another is currently needed...    Putting on and taking off regular lower body clothing?  1  -EN  --     Bathing (including washing, rinsing, and drying)  1  -EN  --     Toileting (which includes using toilet bed pan or urinal)  1  -EN  --     Putting on and taking off regular upper body clothing  1  -EN  --     Taking care of personal grooming (such as brushing teeth)  1  -EN  --     Eating meals  1  -EN  --     AM-PAC 6 Clicks Score (OT)  6  -EN  --        Functional Assessment    Outcome Measure Options  AM-PAC 6 Clicks Daily Activity (OT)  -EN  AM-PAC 6 Clicks Basic Mobility (PT)  -BP       User Key  (r) = Recorded By, (t) = Taken By, (c) = Cosigned By    Initials Name Provider Type    Glory Zhang, OTR Occupational Therapist    BP Isabela Liao, PT Physical Therapist           Time Calculation:   PT Charges     Row Name 11/18/20 1249             Time Calculation    Start Time  0840  -BP        User Key  (r) = Recorded By, (t) = Taken By, (c) = Cosigned By    Initials Name Provider Type    BP  Isabela Liao, PT Physical Therapist        Therapy Charges for Today     Code Description Service Date Service Provider Modifiers Qty    45114676212 HC PT EVAL MOD COMPLEXITY 2 11/18/2020 Isabela Liao, PT GP 1          PT G-Codes  Outcome Measure Options: AM-PAC 6 Clicks Daily Activity (OT)  AM-PAC 6 Clicks Score (PT): 6  AM-PAC 6 Clicks Score (OT): 6    PT Discharge Summary  Anticipated Discharge Disposition (PT): group home  Reason for Discharge: At baseline function    Isabela Liao, PT  11/18/2020

## 2020-11-18 NOTE — PLAN OF CARE
Problem: Adult Inpatient Plan of Care  Goal: Plan of Care Review  Recent Flowsheet Documentation  Taken 11/18/2020 0840 by Isabela Liao, PT  Plan of Care Reviewed With: patient  Outcome Summary: PT Evaluation complete: Patient is oriented to name and location only. He follows approximately 25% of one step commands. Patient able to DF/PF bilateral ankles actively with significant cues and B knees lacks approximately 45 degrees from full extension. Patient unable to provide full history or prior level, information obtained from chart review and staff. Patient with baseline contractures and recently requires assist of two for transfers with use of w/c for mobility. Patient is dependent for anterior weight shift and scooting forward in prep for standing. He is dependent x 2 to attempt standing, unable to extend knees and minimally clears bottom from chair. Per staff, patients mobility is improved when caregivers are present. Recommend continued mobility with nursing staff and caregivers. Recommend home health PT upon return to group home. No further inpatient skilled PT recommendations at this time.

## 2020-11-18 NOTE — PROGRESS NOTES
SERVICE: [facility] HOSPITALIST    CHIEF COMPLAINT: AMS    SUBJECTIVE:    Long discussion with his to long-term caregivers at bedside today.  Stated patient had been admitted to [facility] earlier this month.  That they had concerns about acute aspiration on top of a chronic aspiration.    That he appears acutely sicker today than he did yesterday, with some signs of delirium.  They both have been concerned about a decline in the patient, and within the last month or 2 have gotten his state appointed guardian to make him DNR.  They do not believe that a feeding tube would be best for the patient.    They are very involved in his care at [facility], and state they feel like he is their grandfather.    Discussed that we did not know the reason for his lactic acidosis when he came in, but based on what they said sounded like he had an aspiration pneumonitis, and we will monitor him for development of a aspiration pneumonia.  We have changed his diet in order to help him not further aspirate.  That he may be aspirating more than his baseline due to acute illness.    Review of records from [facility] indicates that the patient was admitted from 10/30-11/5 for left-sided pyelonephritis, where he was treated with meropenem while hospitalized and discharged with fosfomycin for 3 days, should have finished this at 11/8.  He was admitted here 11/14.    Unable to obtain review of systems due to patient's baseline mental status    OBJECTIVE:    /85 (BP Location: Left arm, Patient Position: Lying)   Pulse 91   Temp 97.9 °F (36.6 °C) (Axillary)   Resp 16   Wt 49.9 kg (110 lb)   SpO2 97%   BMI 18.30 kg/m²     MEDS/LABS REVIEWED AND ORDERED    docusate sodium, 100 mg, Oral, BID  donepezil, 5 mg, Oral, Nightly  enoxaparin, 40 mg, Subcutaneous, Q24H  escitalopram, 20 mg, Oral, Daily  losartan, 50 mg, Oral, Daily  memantine, 5 mg, Oral, Q12H  multivitamin with minerals, 1 tablet, Oral, Daily  pantoprazole, 40  mg, Oral, QAM  pravastatin, 10 mg, Oral, Daily  QUEtiapine XR, 150 mg, Oral, Nightly  sodium chloride, 10 mL, Intravenous, Q12H        Physical Exam  Vitals signs and nursing note reviewed.   Constitutional:       General: He is not in acute distress.     Appearance: He is ill-appearing. He is not toxic-appearing.      Comments: Patient not as interactive today, sitting up in chair while caregivers from [employer] attempt to feed patient   Eyes:      Pupils: Pupils are equal, round, and reactive to light.   Cardiovascular:      Rate and Rhythm: Normal rate and regular rhythm.      Heart sounds: No murmur.   Pulmonary:      Effort: Pulmonary effort is normal. No respiratory distress.      Breath sounds: No wheezing or rales.      Comments: Diminished throughout  Abdominal:      General: Abdomen is flat. Bowel sounds are normal. There is no distension.      Palpations: Abdomen is soft.      Tenderness: There is no abdominal tenderness. There is no guarding.   Musculoskeletal:      Comments: Bilateral trace pitting edema   Neurological:      Mental Status: He is disoriented.      Comments: No new focal neuro deficits, but below baseline cognitive and functional status         LAB/DIAGNOSTICS:    Lab Results (last 24 hours)     Procedure Component Value Units Date/Time    Hemoglobin A1c [[id number]]  (Normal) Collected: 11/17/20 0443    Specimen: Blood Updated: 11/17/20 1208     Hemoglobin A1C 5.22 %     Narrative:      Hemoglobin A1C Ranges:    Increased Risk for Diabetes  5.7% to 6.4%  Diabetes                     >= 6.5%  Diabetic Goal                < 7.0%    TSH [[id number]]  (Abnormal) Collected: 11/17/20 0443    Specimen: Blood Updated: 11/17/20 0927     TSH 4.470 uIU/mL     T4, Free [[id number]]  (Normal) Collected: 11/17/20 0443    Specimen: Blood Updated: 11/17/20 0927     Free T4 1.07 ng/dL     Narrative:      Results may be falsely increased if patient taking Biotin.      POC Glucose Once [[id number]]   (Abnormal) Collected: 11/17/20 0720    Specimen: Blood Updated: 11/17/20 0727     Glucose 60 mg/dL     Troponin [[id number]]  (Normal) Collected: 11/17/20 0443    Specimen: Blood Updated: 11/17/20 0608     Troponin T 0.019 ng/mL     Narrative:      Troponin T Reference Range:  <= 0.03 ng/mL-   Negative for AMI  >0.03 ng/mL-     Abnormal for myocardial necrosis.  Clinicians would have to utilize clinical acumen, EKG, Troponin and serial changes to determine if it is an Acute Myocardial Infarction or myocardial injury due to an underlying chronic condition.       Results may be falsely decreased if patient taking Biotin.      Comprehensive Metabolic Panel [[id number]]  (Abnormal) Collected: 11/17/20 0443    Specimen: Blood Updated: 11/17/20 0606     Glucose 58 mg/dL      BUN 18 mg/dL      Creatinine 0.87 mg/dL      Sodium 142 mmol/L      Potassium 3.9 mmol/L      Chloride 111 mmol/L      CO2 19.0 mmol/L      Calcium 8.6 mg/dL      Total Protein 5.4 g/dL      Albumin 2.90 g/dL      ALT (SGPT) 6 U/L      AST (SGOT) 18 U/L      Alkaline Phosphatase 61 U/L      Total Bilirubin 0.4 mg/dL      eGFR Non African Amer 89 mL/min/1.73      Globulin 2.5 gm/dL      A/G Ratio 1.2 g/dL      BUN/Creatinine Ratio 20.7     Anion Gap 12.0 mmol/L     Narrative:      GFR Normal >60  Chronic Kidney Disease <60  Kidney Failure <15      Phosphorus [[id number]]  (Abnormal) Collected: 11/17/20 0443    Specimen: Blood Updated: 11/17/20 0600     Phosphorus 2.2 mg/dL     Magnesium [[id number]]  (Normal) Collected: 11/17/20 0443    Specimen: Blood Updated: 11/17/20 0600     Magnesium 1.8 mg/dL     CBC & Differential [[id number]]  (Abnormal) Collected: 11/17/20 0443    Specimen: Blood Updated: 11/17/20 0532    Narrative:      The following orders were created for panel order CBC & Differential.  Procedure                               Abnormality         Status                     ---------                               -----------         ------                      CBC Auto Differential[[id number]]        Abnormal            Final result                 Please view results for these tests on the individual orders.    CBC Auto Differential [[id number]]  (Abnormal) Collected: 11/17/20 0443    Specimen: Blood Updated: 11/17/20 0532     WBC 4.79 10*3/mm3      RBC 3.95 10*6/mm3      Hemoglobin 11.2 g/dL      Hematocrit 35.7 %      MCV 90.4 fL      MCH 28.4 pg      MCHC 31.4 g/dL      RDW 12.9 %      RDW-SD 41.6 fl      MPV 8.5 fL      Platelets 257 10*3/mm3      Neutrophil % 68.5 %      Lymphocyte % 18.6 %      Monocyte % 8.8 %      Eosinophil % 3.3 %      Basophil % 0.6 %      Immature Grans % 0.2 %      Neutrophils, Absolute 3.28 10*3/mm3      Lymphocytes, Absolute 0.89 10*3/mm3      Monocytes, Absolute 0.42 10*3/mm3      Eosinophils, Absolute 0.16 10*3/mm3      Basophils, Absolute 0.03 10*3/mm3      Immature Grans, Absolute 0.01 10*3/mm3      nRBC 0.0 /100 WBC         Results for orders placed during the hospital encounter of 11/14/20   Adult Transthoracic Echo Complete W/ Cont if Necessary Per Protocol    Narrative · Left ventricular ejection fraction appears to be 61 - 65%. Left   ventricular systolic function is normal.  · Left ventricular diastolic function was normal.  · No significant valvular stenosis or regurgitation noted.  · Estimated right ventricular systolic pressure from tricuspid   regurgitation is normal (<35 mmHg). Calculated right ventricular systolic   pressure from tricuspid regurgitation is 24 mmHg.        No radiology results for the last day      ASSESSMENT/PLAN:    Lactic acidosis w/ ISH due to aspiration pneumonitis  - UA negative, chest CT limited by motion but grossly no signs of pneumonia, and white count has been normal.a left shift  - SLP following, modified diet  -Lactate was 10 on admission and improved with fluids  - ISH POA now resolved  - MS worse today, but clinically not as toxic appearing as yesterday  - Check Procal in AM, if  still negative, unlikely pna, and therefore MS may be delirium and not sign of worsening illness  - No abx unless sign of infection      Essential hypertension  - BP's creeping up today now 150's  -Already back on his home losartan     Baseline Cognitive Deficit  - Jaky caregivers at bedside   -Continued on home meds     Type II NSTEMI  - Cards saw, flat trop, echo normal, due to acute illness     Pulmonary nodule  -Needs follow-up chest CT in 6 months    Hypophos  - Replaced, recheck in AM    > 35 minutes spent in floor time with the patient, reviewing records, and discussing patient's history and care with caregivers at bedside

## 2020-11-18 NOTE — THERAPY DISCHARGE NOTE
Acute Care - Occupational Therapy Initial Evaluation/Discharge   [facility]     Patient Name: Arian Persaud  : 1955  MRN: [id number]  Today's Date: 2020  Onset of Illness/Injury or Date of Surgery: 20  Date of Referral to OT: 20  Referring Physician: Dr. Cummings      Admit Date: 2020       ICD-10-CM ICD-9-CM   1. Lactic acidosis  E87.2 276.2   2. Acute kidney injury (CMS/HCC)  N17.9 584.9     Patient Active Problem List   Diagnosis   • Sepsis (CMS/HCC)   • Lactic acidosis   • Mental retardation   • Essential hypertension   • GERD (gastroesophageal reflux disease)   • Elevated troponin   • Dementia (CMS/HCC)     Past Medical History:   Diagnosis Date   • Coronary artery disease    • Injury of back    • Kidney stones    • Mental retardation      Past Surgical History:   Procedure Laterality Date   • KIDNEY SURGERY     • TONSILLECTOMY            OT ASSESSMENT FLOWSHEET (last 12 hours)      OT Evaluation and Treatment     Row Name 20 0841                   OT Time and Intention    Subjective Information  no complaints  -EN        Document Type  evaluation  -EN        Mode of Treatment  occupational therapy  -EN        Patient Effort  fair  -EN        Comment  Patient very difficult to understand and kept eyes closed thoughout evaluation.   -EN           General Information    Patient Profile Reviewed  yes  -EN        Onset of Illness/Injury or Date of Surgery  20  -EN        Referring Physician  Dr. Cummings  -EN        Patient/Family/Caregiver Comments/Observations  Patient sitting in bedside chair, CNAs in room and reported they lifted him into the chair.   -EN        Prior Level of Function  -- see pertinent history  -EN        Equipment Currently Used at Home  feeding device per chart review, w/c and RW  -EN        Pertinent History of Current Functional Problem  Patient presented to the ED due to worsening cough and weakness. Patient with developmental delays and resides  "in a group home where he has 24/7 care. Caregivers not present for eval but per chart revies and staff report patient required 2 people for transfers into a w/c. Patient is dependent for bathing and dressing however feeds self.   -EN        Existing Precautions/Restrictions  fall contractures  -EN        Limitations/Impairments  safety/cognitive;other (see comments) contratures  -EN        Risks Reviewed  patient:;increased discomfort  -EN        Benefits Reviewed  patient:;improve function  -EN        Barriers to Rehab  previous functional deficit;medically complex;cognitive status;contractures  -EN           Previous Level of Function/Home Environm    BADLs, Premorbid Functional Level  unable to perform;other (see comments) per chart D for ADLs (except feeding)  -EN        Household Ambulation, Premorbid Functional Level  needs assistance for safe performance;needs assistive device for safe performance;needs adaptive equipment for safe performance  -EN           Living Environment    Current Living Arrangements  group home  -EN        Lives With  other (see comments) caregivers in a group home  -EN           Cognition    Orientation Status (Cognition)  oriented to;person;place knew he was in hosptial, did not know birthdaty  -EN        Follows Commands (Cognition)  follows one-step commands;25-49% accuracy;increased processing time needed;initiation impaired;physical/tactile prompts required;repetition of directions required;verbal cues/prompting required  -EN        Personal Safety Interventions  gait belt;nonskid shoes/slippers when out of bed  -EN           Pain Assessment    Additional Documentation  Pain Scale: FACES Pre/Post-Treatment (Group)  -EN           Pain Scale: FACES Pre/Post-Treatment    Pre/Posttreatment Pain Comment  patient stated \"no \" when asked if he had pain however when passively ranging UE/LEs made a face as if it was painful.  -EN           Range of Motion Comprehensive    Comment, General " "Range of Motion  Patient did not attempt AROM when asked for UEs. PROM of B shoulders approximately 70 degrees. Elbow extensioin approximatley -50 degrees, flexion WFL, Wrists 3/4 PROM, Hands 1/2 PROM (held fingers in full extension)  -EN           Bed Mobility    Comment (Bed Mobility)  deferred, up in chair  -EN           Functional Mobility    Functional Mobility- Comment  unable to perform  -EN           Transfer Assessment/Treatment    Comment (Transfers)  Attempted sit to stand, patient was Dependent X 2 and was uanblet to fully extend knees.   -EN           Activities of Daily Living    BADL Assessment/Intervention  feeding;lower body dressing;other (see comments) Dependent for bathing/dressing at baseline  -EN           Self-Feeding Assessment/Training    Comment (Feeding)  Patient would not follow commands for feeding, placed cup in patient's hand however patient did not attempt to bring cup to mouth.   -EN           Plan of Care Review    Plan of Care Reviewed With  patient  -EN        Outcome Summary  OT evaluation completed. Patient oriented to name only but did state he was in a hosptial. Attempted to assess patient's UE AROM however patient unable to follow any commands for testing. PROM of shoulders was limited (approximately 70 degrees) and elbows were - degrees passively. Patient did not initiate holding a cup when placed in hand and PROM of hands was limited by tone (approximately 1/2 range). Patient was dependent X 2 for attempted sit to stand from recliner. Per staff report patient more comfortable with familiar caregivers and \"did more for them yesterday when they were here.\" Recommend caregivers continue to encourage self feeding and perform AROM/PROM daily.   -EN           Therapy Assessment/Plan (OT)    Date of Referral to OT  11/18/20  -EN        Therapy Frequency (OT)  evaluation only  -EN           Therapy Plan Review/Discharge Plan (OT)    Therapy Plan Review (OT)  " "evaluation/treatment results reviewed  -EN          User Key  (r) = Recorded By, (t) = Taken By, (c) = Cosigned By    Initials Name Effective Dates    Glory Zhang OTR 07/05/20 -           Occupational Therapy Education                 Title: PT OT SLP Therapies (Resolved)     Topic: Occupational Therapy (Resolved)     Point: ADL training (Resolved)     Description:   Instruct learner(s) on proper safety adaptation and remediation techniques during self care or transfers.   Instruct in proper use of assistive devices.              Learning Progress Summary           Patient Acceptance, E, NL by EN at 11/18/2020 1150                               User Key     Initials Effective Dates Name Provider Type Discipline    EN 07/05/20 -  Glory Casper OTR Occupational Therapist OT                OT Recommendation and Plan  Therapy Frequency (OT): evaluation only  Plan of Care Review  Plan of Care Reviewed With: patient  Outcome Summary: OT evaluation completed. Patient oriented to name only but did state he was in a hosptial. Attempted to assess patient's UE AROM however patient unable to follow any commands for testing. PROM of shoulders was limited (approximately 70 degrees) and elbows were - degrees passively. Patient did not initiate holding a cup when placed in hand and PROM of hands was limited by tone (approximately 1/2 range). Patient was dependent X 2 for attempted sit to stand from recliner. Per staff report patient more comfortable with familiar caregivers and \"did more for them yesterday when they were here.\" Recommend caregivers continue to encourage self feeding and perform AROM/PROM daily.   Plan of Care Reviewed With: patient  Outcome Summary: OT evaluation completed. Patient oriented to name only but did state he was in a hosptial. Attempted to assess patient's UE AROM however patient unable to follow any commands for testing. PROM of shoulders was limited (approximately 70 " "degrees) and elbows were - degrees passively. Patient did not initiate holding a cup when placed in hand and PROM of hands was limited by tone (approximately 1/2 range). Patient was dependent X 2 for attempted sit to stand from recliner. Per staff report patient more comfortable with familiar caregivers and \"did more for them yesterday when they were here.\" Recommend caregivers continue to encourage self feeding and perform AROM/PROM daily.          Outcome Measures     Row Name 11/18/20 1100             How much help from another is currently needed...    Putting on and taking off regular lower body clothing?  1  -EN      Bathing (including washing, rinsing, and drying)  1  -EN      Toileting (which includes using toilet bed pan or urinal)  1  -EN      Putting on and taking off regular upper body clothing  1  -EN      Taking care of personal grooming (such as brushing teeth)  1  -EN      Eating meals  1  -EN      AM-PAC 6 Clicks Score (OT)  6  -EN         Functional Assessment    Outcome Measure Options  AM-PAC 6 Clicks Daily Activity (OT)  -EN        User Key  (r) = Recorded By, (t) = Taken By, (c) = Cosigned By    Initials Name Provider Type    EN Glory Casper OTR Occupational Therapist          Time Calculation:   Time Calculation- OT     Row Name 11/18/20 1153             Time Calculation- OT    OT Start Time  0840  -EN      OT Stop Time  0905  -EN      OT Time Calculation (min)  25 min  -EN        User Key  (r) = Recorded By, (t) = Taken By, (c) = Cosigned By    Initials Name Provider Type    EN Glory Casper OTR Occupational Therapist          Therapy Charges for Today     Code Description Service Date Service Provider Modifiers Qty    [id number]  OT EVAL MOD COMPLEXITY 2 11/18/2020 Glory Casper OTR GO 1                    MATT Ross  11/18/2020  "

## 2020-11-19 LAB
ANION GAP SERPL CALCULATED.3IONS-SCNC: 10.4 MMOL/L (ref 5–15)
BASOPHILS # BLD AUTO: 0.02 10*3/MM3 (ref 0–0.2)
BASOPHILS NFR BLD AUTO: 0.4 % (ref 0–1.5)
BUN SERPL-MCNC: 10 MG/DL (ref 8–23)
BUN/CREAT SERPL: 11.9 (ref 7–25)
CALCIUM SPEC-SCNC: 9.1 MG/DL (ref 8.6–10.5)
CHLORIDE SERPL-SCNC: 109 MMOL/L (ref 98–107)
CO2 SERPL-SCNC: 22.6 MMOL/L (ref 22–29)
CREAT SERPL-MCNC: 0.84 MG/DL (ref 0.76–1.27)
DEPRECATED RDW RBC AUTO: 40.7 FL (ref 37–54)
EOSINOPHIL # BLD AUTO: 0.23 10*3/MM3 (ref 0–0.4)
EOSINOPHIL NFR BLD AUTO: 4.7 % (ref 0.3–6.2)
ERYTHROCYTE [DISTWIDTH] IN BLOOD BY AUTOMATED COUNT: 12.7 % (ref 12.3–15.4)
GFR SERPL CREATININE-BSD FRML MDRD: 93 ML/MIN/1.73
GLUCOSE SERPL-MCNC: 93 MG/DL (ref 65–99)
HCT VFR BLD AUTO: 40.9 % (ref 37.5–51)
HGB BLD-MCNC: 13.3 G/DL (ref 13–17.7)
IMM GRANULOCYTES # BLD AUTO: 0 10*3/MM3 (ref 0–0.05)
IMM GRANULOCYTES NFR BLD AUTO: 0 % (ref 0–0.5)
LYMPHOCYTES # BLD AUTO: 1.05 10*3/MM3 (ref 0.7–3.1)
LYMPHOCYTES NFR BLD AUTO: 21.5 % (ref 19.6–45.3)
MAGNESIUM SERPL-MCNC: 1.8 MG/DL (ref 1.6–2.4)
MCH RBC QN AUTO: 28.4 PG (ref 26.6–33)
MCHC RBC AUTO-ENTMCNC: 32.5 G/DL (ref 31.5–35.7)
MCV RBC AUTO: 87.4 FL (ref 79–97)
MONOCYTES # BLD AUTO: 0.43 10*3/MM3 (ref 0.1–0.9)
MONOCYTES NFR BLD AUTO: 8.8 % (ref 5–12)
NEUTROPHILS NFR BLD AUTO: 3.16 10*3/MM3 (ref 1.7–7)
NEUTROPHILS NFR BLD AUTO: 64.6 % (ref 42.7–76)
PHOSPHATE SERPL-MCNC: 2.7 MG/DL (ref 2.5–4.5)
PLATELET # BLD AUTO: 245 10*3/MM3 (ref 140–450)
PMV BLD AUTO: 8.3 FL (ref 6–12)
POTASSIUM SERPL-SCNC: 3.6 MMOL/L (ref 3.5–5.2)
PROCALCITONIN SERPL-MCNC: 0.06 NG/ML (ref 0–0.25)
RBC # BLD AUTO: 4.68 10*6/MM3 (ref 4.14–5.8)
SODIUM SERPL-SCNC: 142 MMOL/L (ref 136–145)
WBC # BLD AUTO: 4.89 10*3/MM3 (ref 3.4–10.8)

## 2020-11-19 PROCEDURE — 25010000003 AMPICILLIN-SULBACTAM PER 1.5 G: Performed by: INTERNAL MEDICINE

## 2020-11-19 PROCEDURE — 25010000003 AMPICILLIN-SULBACTAM PER 1.5 G

## 2020-11-19 PROCEDURE — 25010000002 ENOXAPARIN PER 10 MG: Performed by: INTERNAL MEDICINE

## 2020-11-19 PROCEDURE — 99232 SBSQ HOSP IP/OBS MODERATE 35: CPT | Performed by: INTERNAL MEDICINE

## 2020-11-19 PROCEDURE — 85025 COMPLETE CBC W/AUTO DIFF WBC: CPT | Performed by: INTERNAL MEDICINE

## 2020-11-19 PROCEDURE — 84145 PROCALCITONIN (PCT): CPT | Performed by: INTERNAL MEDICINE

## 2020-11-19 PROCEDURE — 80048 BASIC METABOLIC PNL TOTAL CA: CPT | Performed by: INTERNAL MEDICINE

## 2020-11-19 PROCEDURE — 83735 ASSAY OF MAGNESIUM: CPT | Performed by: INTERNAL MEDICINE

## 2020-11-19 PROCEDURE — 92610 EVALUATE SWALLOWING FUNCTION: CPT

## 2020-11-19 PROCEDURE — 84100 ASSAY OF PHOSPHORUS: CPT | Performed by: INTERNAL MEDICINE

## 2020-11-19 RX ORDER — SODIUM CHLORIDE 9 MG/ML
INJECTION, SOLUTION INTRAVENOUS
Status: COMPLETED
Start: 2020-11-19 | End: 2020-11-19

## 2020-11-19 RX ORDER — AMPICILLIN AND SULBACTAM 2; 1 G/1; G/1
INJECTION, POWDER, FOR SOLUTION INTRAMUSCULAR; INTRAVENOUS
Status: COMPLETED
Start: 2020-11-19 | End: 2020-11-19

## 2020-11-19 RX ADMIN — AMPICILLIN SODIUM AND SULBACTAM SODIUM 3 G: 2; 1 INJECTION, POWDER, FOR SOLUTION INTRAMUSCULAR; INTRAVENOUS at 04:37

## 2020-11-19 RX ADMIN — PRAVASTATIN SODIUM 10 MG: 20 TABLET ORAL at 08:35

## 2020-11-19 RX ADMIN — AMPICILLIN SODIUM AND SULBACTAM SODIUM 3 G: 2; 1 INJECTION, POWDER, FOR SOLUTION INTRAMUSCULAR; INTRAVENOUS at 16:56

## 2020-11-19 RX ADMIN — QUETIAPINE FUMARATE 150 MG: 50 TABLET, EXTENDED RELEASE ORAL at 20:41

## 2020-11-19 RX ADMIN — AMPICILLIN SODIUM AND SULBACTAM SODIUM 3 G: 2; 1 INJECTION, POWDER, FOR SOLUTION INTRAMUSCULAR; INTRAVENOUS at 11:07

## 2020-11-19 RX ADMIN — LOSARTAN POTASSIUM 50 MG: 50 TABLET, FILM COATED ORAL at 08:34

## 2020-11-19 RX ADMIN — ESCITALOPRAM OXALATE 20 MG: 10 TABLET, FILM COATED ORAL at 08:34

## 2020-11-19 RX ADMIN — PANTOPRAZOLE SODIUM 40 MG: 40 TABLET, DELAYED RELEASE ORAL at 06:06

## 2020-11-19 RX ADMIN — ENOXAPARIN SODIUM 40 MG: 40 INJECTION SUBCUTANEOUS at 18:15

## 2020-11-19 RX ADMIN — SODIUM CHLORIDE, PRESERVATIVE FREE 10 ML: 5 INJECTION INTRAVENOUS at 08:35

## 2020-11-19 RX ADMIN — SODIUM CHLORIDE 100 ML: 900 INJECTION INTRAVENOUS at 04:37

## 2020-11-19 RX ADMIN — DOCUSATE SODIUM 100 MG: 100 CAPSULE ORAL at 08:35

## 2020-11-19 RX ADMIN — DOCUSATE SODIUM 100 MG: 100 CAPSULE ORAL at 20:41

## 2020-11-19 RX ADMIN — MEMANTINE HYDROCHLORIDE 5 MG: 5 TABLET ORAL at 20:41

## 2020-11-19 RX ADMIN — AMPICILLIN SODIUM AND SULBACTAM SODIUM 3 G: 2; 1 INJECTION, POWDER, FOR SOLUTION INTRAMUSCULAR; INTRAVENOUS at 23:02

## 2020-11-19 RX ADMIN — MULTIPLE VITAMINS W/ MINERALS TAB 1 TABLET: TAB at 08:34

## 2020-11-19 RX ADMIN — SODIUM CHLORIDE, PRESERVATIVE FREE 10 ML: 5 INJECTION INTRAVENOUS at 20:42

## 2020-11-19 RX ADMIN — DONEPEZIL HYDROCHLORIDE 5 MG: 5 TABLET ORAL at 20:41

## 2020-11-19 RX ADMIN — MEMANTINE HYDROCHLORIDE 5 MG: 5 TABLET ORAL at 08:35

## 2020-11-19 RX ADMIN — AMPICILLIN SODIUM AND SULBACTAM SODIUM 3 G: 2; 1 INJECTION, POWDER, FOR SOLUTION INTRAMUSCULAR; INTRAVENOUS at 04:36

## 2020-11-19 NOTE — NURSING NOTE
Continued Stay Note  JOANN Nelson     Patient Name: Arian Persaud  MRN: [id number]  Today's Date: 11/19/2020    Admit Date: 11/14/2020    Discharge Plan     Row Name 11/19/20 1700       Plan    Plan  to be determined    Plan Comments  Per request by Dr Sweeney, call to [facility] to see if they can accept patient/provide transportation back to [facility] today. Spoke with Brenda who states she will check with her manager and call back. Return call rec'd from Sanam Tripp who has spoken with the facility , and informs CM that they do not feel it is safe for patient to return to the facility, his team (therapist, doctors, staff) has a team meeting scheduled for Monday to discuss his plan of care.They are not able to accept patient at this time and cannot move forward with a different plan of care until after the team meeting on Monday 11/23/20. She states they feel he needs SNF level of care, or they have attempted to get [facility] for the patient. Call placed to  Rosette Mei [phone]. She repeats that they will not accept patient back at this time and will discuss his plan of care at the team meeting on Monday. CM informs that patient is medically ready for dc TODAY and it is unacceptable to keep him in acute care until Monday and mandate that moving forward with either LTC placement of [facility] care cannot happen until after the team meeting.  She states that [facility] CM could begin to look for placement and they will discuss it in the team meeting. Clarified that the [facility] wants [facility]'s CM to seek placement for the patient.Rosette/ states that [facility] has her permission to seek placement for the patient. CM states that no placement will occur without first talking to the patients state guardian, Beny Simon.  has tried multiple times to reach Mr Simon without success. Rosette provides an additional contact # for Beny  "Alfred and attempts to get him on the phone to join the conversation - no answer. Asked why this has not been mentioned prior to patient being ready for dc, CM was told that the plan was to return to the [facility] and they would arrange transportation. Rosette states no one had discussed with her, \" I don't know who told you that, but they did not have the authority to say anything\" . Name of care giver that discussed patients baseline status and plan to retun to the group home given. Noted that Rosette/ has known the patient is in the hospital and she did not contact CM, nursing staff or MD regarding a different plan of care for the patient. Case reviewed with Natty Raya/mgr. Call to Beny Simon/guardian - voice message requesting return call left. Dr Cummings updated. CM will contact patients guardian and begin search for placement.        Discharge Codes    No documentation.             Bill Trujillo RN    "

## 2020-11-19 NOTE — PLAN OF CARE
Problem: Adult Inpatient Plan of Care  Goal: Plan of Care Review  Outcome: Ongoing, Progressing  Flowsheets (Taken 11/19/2020 1212)  Outcome Summary: SLP: Pt with limited participation in swallow eval. Able to get him to drink from a straw w/no clinical s/s of aspiration noted. Oral holding as pt did not want to take any po. Attempted puree w/limited intake and again oral holding. Small diced pear given with oral holding and then pt swallowed whole. Very limited exam, but no clinical s/s of aspiration on thins. Recommend to upgrade diet to thins, but keep puree due to no attempt to chew and swallowing of foods whole. Will follow while in hospital, but recommend further assessment as outpatient with SLP when caregivers present as pt may be more cooperative with exam.

## 2020-11-19 NOTE — PROGRESS NOTES
SERVICE: [facility] HOSPITALIST    CHIEF COMPLAINT: Aspiration PNA    SUBJECTIVE:    Patient is slightly more responsive today, caregivers are not at bedside.  However remains very lethargic.    Notes from last night reviewed, no acute events overnight    Further history and review of systems are unable to obtain    OBJECTIVE:    /71 (BP Location: Left arm, Patient Position: Lying)   Pulse 95   Temp 100.5 °F (38.1 °C) (Axillary)   Resp 20   Wt 49.9 kg (110 lb)   SpO2 100%   BMI 18.30 kg/m²     MEDS/LABS REVIEWED AND ORDERED    docusate sodium, 100 mg, Oral, BID  donepezil, 5 mg, Oral, Nightly  enoxaparin, 40 mg, Subcutaneous, Q24H  escitalopram, 20 mg, Oral, Daily  losartan, 50 mg, Oral, Daily  memantine, 5 mg, Oral, Q12H  multivitamin with minerals, 1 tablet, Oral, Daily  pantoprazole, 40 mg, Oral, QAM  pravastatin, 10 mg, Oral, Daily  QUEtiapine XR, 150 mg, Oral, Nightly  sodium chloride, 10 mL, Intravenous, Q12H        Physical Exam  Vitals signs and nursing note reviewed.   Constitutional:       General: He is not in acute distress.     Appearance: He is not toxic-appearing or diaphoretic.   Eyes:      Pupils: Pupils are equal, round, and reactive to light.   Cardiovascular:      Rate and Rhythm: Normal rate and regular rhythm.      Heart sounds: Normal heart sounds.   Pulmonary:      Effort: Pulmonary effort is normal.      Breath sounds: Normal breath sounds.   Abdominal:      General: Bowel sounds are normal. There is no distension.      Palpations: Abdomen is soft.      Tenderness: There is no abdominal tenderness.   Musculoskeletal:      Right lower leg: No edema.      Left lower leg: No edema.   Skin:     General: Skin is warm and dry.   Neurological:      General: No focal deficit present.      Mental Status: He is disoriented.      Cranial Nerves: No cranial nerve deficit.         LAB/DIAGNOSTICS:    Lab Results (last 24 hours)     Procedure Component Value Units Date/Time     Basic Metabolic Panel [[id number]]  (Abnormal) Collected: 11/18/20 0555    Specimen: Blood Updated: 11/18/20 0903     Glucose 136 mg/dL      BUN 11 mg/dL      Creatinine 0.84 mg/dL      Sodium 141 mmol/L      Potassium 3.4 mmol/L      Chloride 111 mmol/L      CO2 21.8 mmol/L      Calcium 8.5 mg/dL      eGFR Non African Amer 93 mL/min/1.73      BUN/Creatinine Ratio 13.1     Anion Gap 8.2 mmol/L     Narrative:      GFR Normal >60  Chronic Kidney Disease <60  Kidney Failure <15      CBC & Differential [[id number]]  (Abnormal) Collected: 11/18/20 0555    Specimen: Blood Updated: 11/18/20 0856    Narrative:      The following orders were created for panel order CBC & Differential.  Procedure                               Abnormality         Status                     ---------                               -----------         ------                     CBC Auto Differential[[id number]]        Abnormal            Final result                 Please view results for these tests on the individual orders.    CBC Auto Differential [[id number]]  (Abnormal) Collected: 11/18/20 0555    Specimen: Blood Updated: 11/18/20 0856     WBC 5.28 10*3/mm3      RBC 4.06 10*6/mm3      Hemoglobin 11.6 g/dL      Hematocrit 35.7 %      MCV 87.9 fL      MCH 28.6 pg      MCHC 32.5 g/dL      RDW 12.8 %      RDW-SD 41.2 fl      MPV 8.5 fL      Platelets 236 10*3/mm3      Neutrophil % 67.1 %      Lymphocyte % 18.2 %      Monocyte % 9.5 %      Eosinophil % 4.4 %      Basophil % 0.6 %      Immature Grans % 0.2 %      Neutrophils, Absolute 3.55 10*3/mm3      Lymphocytes, Absolute 0.96 10*3/mm3      Monocytes, Absolute 0.50 10*3/mm3      Eosinophils, Absolute 0.23 10*3/mm3      Basophils, Absolute 0.03 10*3/mm3      Immature Grans, Absolute 0.01 10*3/mm3     Procalcitonin [[id number]]  (Normal) Collected: 11/18/20 0555    Specimen: Blood Updated: 11/18/20 0638     Procalcitonin 0.06 ng/mL     Narrative:      Results may be falsely decreased if patient  taking Biotin.     Phosphorus [[id number]]  (Abnormal) Collected: 11/18/20 0555    Specimen: Blood Updated: 11/18/20 0629     Phosphorus 2.3 mg/dL     Magnesium [[id number]]  (Normal) Collected: 11/18/20 0555    Specimen: Blood Updated: 11/18/20 0629     Magnesium 1.7 mg/dL         Results for orders placed during the hospital encounter of 11/14/20   Adult Transthoracic Echo Complete W/ Cont if Necessary Per Protocol    Narrative · Left ventricular ejection fraction appears to be 61 - 65%. Left   ventricular systolic function is normal.  · Left ventricular diastolic function was normal.  · No significant valvular stenosis or regurgitation noted.  · Estimated right ventricular systolic pressure from tricuspid   regurgitation is normal (<35 mmHg). Calculated right ventricular systolic   pressure from tricuspid regurgitation is 24 mmHg.        No radiology results for the last day      ASSESSMENT/PLAN:    Lactic acidosis w/ ISH due to aspiration pneumonitis  - UA negative, chest CT limited by motion but grossly no signs of pneumonia, and white count has been normal.a left shift  - SLP following, modified diet  -Lactate was 10 on admission and improved with fluids  - ISH POA now resolved  - procal still negative, no abx without signs of infection  - Continue diet modifications, SLP available tomorrow      Essential hypertension  - BP's have become uncontrolled, Already back on his home losartan     Baseline Cognitive Deficit  - [place] caregivers at bedside   -Continued on home meds     Type II NSTEMI  - Cards saw, flat trop, echo normal, due to acute illness     Pulmonary nodule  -Needs follow-up chest CT in 6 months

## 2020-11-19 NOTE — THERAPY EVALUATION
Acute Care - Speech Language Pathology   Swallow Initial Evaluation  [place]     Patient Name: Arian Persaud  : 1955  MRN: [id number]  Today's Date: 2020  Onset of Illness/Injury or Date of Surgery: 20     Referring Physician: Dr. Cummings      Admit Date: 2020    Visit Dx:     ICD-10-CM ICD-9-CM   1. Lactic acidosis  E87.2 276.2   2. Acute kidney injury (CMS/HCC)  N17.9 584.9     Patient Active Problem List   Diagnosis   • Sepsis (CMS/HCC)   • Lactic acidosis   • Mental retardation   • Essential hypertension   • GERD (gastroesophageal reflux disease)   • Elevated troponin   • Dementia (CMS/HCC)     Past Medical History:   Diagnosis Date   • Coronary artery disease    • Injury of back    • Kidney stones    • Mental retardation      Past Surgical History:   Procedure Laterality Date   • KIDNEY SURGERY     • TONSILLECTOMY          SWALLOW EVALUATION (last 72 hours)      SLP Adult Swallow Evaluation     Row Name 20 1206       Rehab Evaluation    Document Type  evaluation  -AD    Total Evaluation Minutes, SLP  41  -AD    Subjective Information  complains of;pain legs when positioned upright in bed; appears to resolve  -AD    Patient Observations  alert;agree to therapy;poorly cooperative  -AD    Patient/Family/Caregiver Comments/Observations  Pt in bed leaning to left side. SLP repositioned to near 90 degrees with pillow placed under left leg in order to maintain position on his back.   -AD    Care Plan Review  evaluation/treatment results reviewed;risks/benefits reviewed;current/potential barriers reviewed;patient/other agree to care plan pt does not demonstrate understanding.  -AD    Patient Effort  fair  -AD    Comment  Pt appears to be anxious in new situation/around new people. No caregivers present at this time. He refuses to take po at times but able to coax and get minimal amounts of thins, puree and one minced piece of pear.   -AD    Symptoms Noted During/After Treatment   increased pain  -AD    Symptoms Noted, Comment  with upright positioning; appears to resolve after being upright a few minutes.  -AD       General Information    Patient Profile Reviewed  yes  -AD    Pertinent History Of Current Problem  Pt is a 65 y/o resident of [facility] (formerly [facility]). He was admitted for high lactic acid and ISH. Recent hospitalization for pyelonephritis. Concerns for aspiration reported and swallow eval ordered. Pt on prior diet of soft bite size and thins. Currently placed on puree with nectar thick liquids here. Hx significant for CAD, kidney stones, intellectual disability, tonsillectomy.  -AD    Current Method of Nutrition  pureed;nectar/syrup-thick liquids  -AD    Precautions/Limitations, Vision  other (see comments) unale to assess  -AD    Precautions/Limitations, Hearing  WFL;for purposes of eval  -AD    Prior Level of Function-Communication  other (see comments);cognitive-linguistic impairment intellectual disability  -AD    Prior Level of Function-Swallowing  mechanical soft textures;thin liquids as best as can be gleaned from chart review  -AD    Plans/Goals Discussed with  patient does not agree or disagree  -AD    Barriers to Rehab  cognitive status;contractures  -AD    Patient's Goals for Discharge  patient did not state  -AD    Family Goals for Discharge  other (see comments) no caregivers present  -AD       Pain Scale: FACES Pre/Post-Treatment    Pre/Posttreatment Pain Comment  Pain with repositioning that appears to resolve with minimal time. Pt does not report this pain or give a level.   -AD       Oral Motor Structure and Function    Oral Lesions or Structural Abnormalities and/or variants  Appears to have a coating on his tongue but difficult to fully assess due to pt not allowing for full viewing  -AD    Dentition Assessment  other (see comments) unable to assess; no teeth seen  -AD    Secretion Management  WNL/WFL;other (see comments) no gross pooling noted  in mouth  -AD    Mucosal Quality  other (see comments) not dry appearing in front of mouth  -AD    Gag Response  other (see comments) unable to assess  -AD    Volitional Swallow  unable to elicit  -AD    Volitional Cough  unable to elicit  -AD       Oral Musculature and Cranial Nerve Assessment    Oral Motor General Assessment  unable to assess  -AD    Oral Motor, Comment  Pt does not allow for oral motor exam. He keeps his mouth tightly closed. Symmetrical lip closure noted w/adequate strength. Voice is very soft/decreased intensity. Difficult to undestand at times.   -AD       General Eating/Swallowing Observations    Respiratory Support Currently in Use  room air  -AD    Eating/Swallowing Skills  fed by SLP  -AD    Positioning During Eating  upright in bed;contracted;needs frequent re-positioning near 90 degrees w/legs contracted and left hand/arm  -AD    Utensils Used  spoon;straw  -AD    Consistencies Trialed  soft textures;chopped;pureed;thin liquids  -AD       Respiratory    Respiratory Status  WFL;room air;during swallowing/eating  -AD       Clinical Swallow Eval    Oral Prep Phase  impaired  -AD    Oral Transit  impaired  -AD    Oral Residue  WFL  -AD    Pharyngeal Phase  no overt signs/symptoms of pharyngeal impairment  -AD    Esophageal Phase  unremarkable  -AD    Clinical Swallow Evaluation Summary  Pt presents with a moderate oral and mild pharyngeal dysphagia w/o overt s/s of aspiration. Pt with limited participation in swallow eval. Able to get him to drink from a straw w/no clinical s/s of aspiration noted. Oral holding as pt did not want to take any po. Attempted puree w/limited intake and again oral holding. Small diced pear given with oral holding and then pt swallowed whole. Very limited exam, but no clinical s/s of aspiration on thins. Recommend to upgrade diet to thins, but keep puree due to no attempt to chew and swallowing of foods whole. Mild anterior loss noted on both left and right with  trials of thins and when pear given.   -AD       Oral Prep Concerns    Oral Prep Concerns  anterior loss;incomplete bolus preparation;oral holding  -AD    Oral Holding  thin;pudding;mechanical soft;all consistencies  -AD    Anterior Loss  thin;mixed consistencies pear juice  -AD    Incomplete Bolus Preparation  mechanical soft;mixed consistencies  -AD    Oral Prep Concerns, Comment  pt with mild anterior loss out of both right and left side, felt to be due to oral holding of liquids, puree and mechanical soft items. Feel holding is in oral cavity as pt does not want to take any po at this time.   -AD       Oral Transit Concerns    Oral Transit Concerns  increased oral transit time  -AD    Increased Oral Transit Time  thin;pudding;mixed consistencies  -AD    Oral Transit Concerns, Comment  Increased time due to decrease oral prep and oral holding.   -AD       Oral Residue Concerns    Oral Residue Concerns  other (see comments)  -AD    Oral Residue Concerns, Comment  No residue after the swallow, but pt requesting to remove pear bolus, but made no effort to spit it out or to open his mouth in order for SLP to remove. Eventually swallowed the piece of pear.   -AD       Clinical Impression    SLP Swallowing Diagnosis  moderate;oral dysphagia;mild;pharyngeal dysphagia  -AD    Functional Impact  risk of malnutrition;risk of dehydration  -AD    Rehab Potential/Prognosis, Swallowing  re-evaluate goals as necessary  -AD    Swallow Criteria for Skilled Therapeutic Interventions Met  demonstrates skilled criteria  -AD       Recommendations    Therapy Frequency (Swallow)  PRN  -AD    Predicted Duration Therapy Intervention (Days)  3 days  -AD    SLP Diet Recommendation  puree;thin liquids  -AD    Recommended Precautions and Strategies  upright posture during/after eating;small bites of food and sips of liquid;alternate between small bites of food and sips of liquid;check mouth frequently for oral residue/pocketing  -AD    Oral  Care Recommendations  Oral Care before breakfast, after meals and PRN  -AD    SLP Rec. for Method of Medication Administration  meds crushed;with pudding or applesauce;as tolerated  -AD    Monitor for Signs of Aspiration  no;notify SLP if any concerns  -AD    Anticipated Discharge Disposition (SLP)  [facility] ([facility])  -AD       Swallow Goals (SLP)    Oral Nutrition/Hydration Goal Selection (SLP)  oral nutrition/hydration, SLP goal 1  -AD       Oral Nutrition/Hydration Goal 1 (SLP)    Oral Nutrition/Hydration Goal 1, SLP  Pt will tolerate a puree diet with thin liquids w/o oral residue or clinical s/s of aspiration.   -AD    Time Frame (Oral Nutrition/Hydration Goal 1, SLP)  short term goal (STG);3 days  -AD    Barriers (Oral Nutrition/Hydration Goal 1, SLP)  cognitive status  -AD    Progress/Outcomes (Oral Nutrition/Hydration Goal 1, SLP)  other (see comments) New  -AD      User Key  (r) = Recorded By, (t) = Taken By, (c) = Cosigned By    Initials Name Effective Dates    Micaela Thacker MS CCC-SLP 08/09/20 -           EDUCATION  The patient has been educated in the following areas:   Dysphagia (Swallowing Impairment) Modified Diet Instruction. Pt does not demonstrate understanding at this time. No caregivers present.     SLP Recommendation and Plan  SLP Swallowing Diagnosis: moderate, oral dysphagia, mild, pharyngeal dysphagia  SLP Diet Recommendation: puree, thin liquids  Recommended Precautions and Strategies: upright posture during/after eating, small bites of food and sips of liquid, alternate between small bites of food and sips of liquid, check mouth frequently for oral residue/pocketing  SLP Rec. for Method of Medication Administration: meds crushed, with pudding or applesauce, as tolerated  Monitor for Signs of Aspiration: no, notify SLP if any concerns  Swallow Criteria for Skilled Therapeutic Interventions Met: demonstrates skilled criteria  Anticipated Discharge  Disposition (SLP): [facility]-based residential facility ([facility])  Rehab Potential/Prognosis, Swallowing: re-evaluate goals as necessary  Therapy Frequency (Swallow): PRN  Predicted Duration Therapy Intervention (Days): 3 days       Outcome Summary: SLP: Pt with limited participation in swallow eval. Able to get him to drink from a straw w/no clinical s/s of aspiration noted. Oral holding as pt did not want to take any po. Attempted puree w/limited intake and again oral holding. Small diced pear given with oral holding and then pt swallowed whole. Very limited exam, but no clinical s/s of aspiration on thins. Recommend to upgrade diet to thins, but keep puree due to no attempt to chew and swallowing of foods whole. Will follow while in hospital, but recommend further assessment as outpatient with SLP when caregivers present as pt may be more cooperative with exam.    SLP GOALS     Row Name 11/19/20 1206             Oral Nutrition/Hydration Goal 1 (SLP)    Oral Nutrition/Hydration Goal 1, SLP  Pt will tolerate a puree diet with thin liquids w/o oral residue or clinical s/s of aspiration.   -AD      Time Frame (Oral Nutrition/Hydration Goal 1, SLP)  short term goal (STG);3 days  -AD      Barriers (Oral Nutrition/Hydration Goal 1, SLP)  cognitive status  -AD      Progress/Outcomes (Oral Nutrition/Hydration Goal 1, SLP)  other (see comments) New  -AD        User Key  (r) = Recorded By, (t) = Taken By, (c) = Cosigned By    Initials Name Provider Type    AD Micaela Childers, MS CCC-SLP Speech and Language Pathologist             Time Calculation:   Time Calculation- SLP     Row Name 11/19/20 1608             Time Calculation- SLP    SLP Start Time  1125  -AD      SLP Stop Time  1206  -AD      SLP Time Calculation (min)  41 min  -AD      Total Timed Code Minutes- SLP  0 minute(s)  -AD      SLP Non-Billable Time (min)  0 min  -AD      TCU Minutes- SLP  0 min  -AD      SLP Received On  11/19/20  -AD        User Key  (r) =  Recorded By, (t) = Taken By, (c) = Cosigned By    Initials Name Provider Type    AD Micaela Childers, MS CCC-SLP Speech and Language Pathologist          Therapy Charges for Today     Code Description Service Date Service Provider Modifiers Qty    [id number]  ST EVAL ORAL PHARYNG SWALLOW 3 11/19/2020 Micaela Childers MS CCC-SLP GN 1               Micaela Childers MS CCC-SLP  11/19/2020

## 2020-11-19 NOTE — PLAN OF CARE
Goal Outcome Evaluation:  Plan of Care Reviewed With: patient  Progress: no change  Outcome Summary: Pt nonverbal.  No evidence of pain or discomfort.  Assist x 1-2 with bed mobility and positioning.  Noted with contractures to bilateral upper and lower extremeties.  Poor appetite.  Remains on pureed diet with NTL to prevent aspiration.  Heels elevated off bed.  Frequent turning and repositioning.  Brief in placed but pt is a heavy wetter, so incontinence care is frequent.  Must be fed meals.  Call light within reach.

## 2020-11-19 NOTE — PLAN OF CARE
Problem: Adult Inpatient Plan of Care  Goal: Plan of Care Review  Outcome: Ongoing, Progressing  Flowsheets  Taken 11/19/2020 0541 by Candace Malhotra RN  Progress: no change  Outcome Summary: vss, poor appetite, phosphorus replaced per protocol during day shift, will continue to monitor labs for replacement protocol, no c/o pain  Taken 11/18/2020 0841 by Glory Casper OTR  Plan of Care Reviewed With: patient   Goal Outcome Evaluation:  Plan of Care Reviewed With: patient  Progress: no change

## 2020-11-20 LAB
ANION GAP SERPL CALCULATED.3IONS-SCNC: 12 MMOL/L (ref 5–15)
BASOPHILS # BLD AUTO: 0.02 10*3/MM3 (ref 0–0.2)
BASOPHILS NFR BLD AUTO: 0.3 % (ref 0–1.5)
BUN SERPL-MCNC: 14 MG/DL (ref 8–23)
BUN/CREAT SERPL: 15.1 (ref 7–25)
CALCIUM SPEC-SCNC: 8.6 MG/DL (ref 8.6–10.5)
CHLORIDE SERPL-SCNC: 106 MMOL/L (ref 98–107)
CO2 SERPL-SCNC: 24 MMOL/L (ref 22–29)
CREAT SERPL-MCNC: 0.93 MG/DL (ref 0.76–1.27)
DEPRECATED RDW RBC AUTO: 41.4 FL (ref 37–54)
EOSINOPHIL # BLD AUTO: 0.19 10*3/MM3 (ref 0–0.4)
EOSINOPHIL NFR BLD AUTO: 3.3 % (ref 0.3–6.2)
ERYTHROCYTE [DISTWIDTH] IN BLOOD BY AUTOMATED COUNT: 13 % (ref 12.3–15.4)
GFR SERPL CREATININE-BSD FRML MDRD: 82 ML/MIN/1.73
GLUCOSE SERPL-MCNC: 69 MG/DL (ref 65–99)
HCT VFR BLD AUTO: 39.6 % (ref 37.5–51)
HGB BLD-MCNC: 12.6 G/DL (ref 13–17.7)
IMM GRANULOCYTES # BLD AUTO: 0.01 10*3/MM3 (ref 0–0.05)
IMM GRANULOCYTES NFR BLD AUTO: 0.2 % (ref 0–0.5)
LYMPHOCYTES # BLD AUTO: 0.9 10*3/MM3 (ref 0.7–3.1)
LYMPHOCYTES NFR BLD AUTO: 15.7 % (ref 19.6–45.3)
MCH RBC QN AUTO: 28.7 PG (ref 26.6–33)
MCHC RBC AUTO-ENTMCNC: 31.8 G/DL (ref 31.5–35.7)
MCV RBC AUTO: 90.2 FL (ref 79–97)
MONOCYTES # BLD AUTO: 0.49 10*3/MM3 (ref 0.1–0.9)
MONOCYTES NFR BLD AUTO: 8.6 % (ref 5–12)
NEUTROPHILS NFR BLD AUTO: 4.12 10*3/MM3 (ref 1.7–7)
NEUTROPHILS NFR BLD AUTO: 71.9 % (ref 42.7–76)
NRBC BLD AUTO-RTO: 0 /100 WBC (ref 0–0.2)
PLATELET # BLD AUTO: 240 10*3/MM3 (ref 140–450)
PMV BLD AUTO: 8.4 FL (ref 6–12)
POTASSIUM SERPL-SCNC: 3.9 MMOL/L (ref 3.5–5.2)
RBC # BLD AUTO: 4.39 10*6/MM3 (ref 4.14–5.8)
SODIUM SERPL-SCNC: 142 MMOL/L (ref 136–145)
WBC # BLD AUTO: 5.73 10*3/MM3 (ref 3.4–10.8)

## 2020-11-20 PROCEDURE — 99231 SBSQ HOSP IP/OBS SF/LOW 25: CPT | Performed by: HOSPITALIST

## 2020-11-20 PROCEDURE — 85025 COMPLETE CBC W/AUTO DIFF WBC: CPT | Performed by: INTERNAL MEDICINE

## 2020-11-20 PROCEDURE — 80048 BASIC METABOLIC PNL TOTAL CA: CPT | Performed by: INTERNAL MEDICINE

## 2020-11-20 PROCEDURE — 25010000003 AMPICILLIN-SULBACTAM PER 1.5 G: Performed by: INTERNAL MEDICINE

## 2020-11-20 PROCEDURE — 25010000002 ENOXAPARIN PER 10 MG: Performed by: INTERNAL MEDICINE

## 2020-11-20 RX ADMIN — DOCUSATE SODIUM 100 MG: 100 CAPSULE ORAL at 22:32

## 2020-11-20 RX ADMIN — PRAVASTATIN SODIUM 10 MG: 20 TABLET ORAL at 08:49

## 2020-11-20 RX ADMIN — SODIUM CHLORIDE, PRESERVATIVE FREE 10 ML: 5 INJECTION INTRAVENOUS at 08:50

## 2020-11-20 RX ADMIN — MULTIPLE VITAMINS W/ MINERALS TAB 1 TABLET: TAB at 08:49

## 2020-11-20 RX ADMIN — DOCUSATE SODIUM 100 MG: 100 CAPSULE ORAL at 08:49

## 2020-11-20 RX ADMIN — AMPICILLIN SODIUM AND SULBACTAM SODIUM 3 G: 2; 1 INJECTION, POWDER, FOR SOLUTION INTRAMUSCULAR; INTRAVENOUS at 04:18

## 2020-11-20 RX ADMIN — AMPICILLIN SODIUM AND SULBACTAM SODIUM 3 G: 2; 1 INJECTION, POWDER, FOR SOLUTION INTRAMUSCULAR; INTRAVENOUS at 17:38

## 2020-11-20 RX ADMIN — SODIUM CHLORIDE, PRESERVATIVE FREE 10 ML: 5 INJECTION INTRAVENOUS at 22:33

## 2020-11-20 RX ADMIN — DONEPEZIL HYDROCHLORIDE 5 MG: 5 TABLET ORAL at 22:31

## 2020-11-20 RX ADMIN — ESCITALOPRAM OXALATE 20 MG: 10 TABLET, FILM COATED ORAL at 08:49

## 2020-11-20 RX ADMIN — PANTOPRAZOLE SODIUM 40 MG: 40 TABLET, DELAYED RELEASE ORAL at 06:09

## 2020-11-20 RX ADMIN — MEMANTINE HYDROCHLORIDE 5 MG: 5 TABLET ORAL at 22:31

## 2020-11-20 RX ADMIN — AMPICILLIN SODIUM AND SULBACTAM SODIUM 3 G: 2; 1 INJECTION, POWDER, FOR SOLUTION INTRAMUSCULAR; INTRAVENOUS at 11:53

## 2020-11-20 RX ADMIN — LOSARTAN POTASSIUM 50 MG: 50 TABLET, FILM COATED ORAL at 08:49

## 2020-11-20 RX ADMIN — MEMANTINE HYDROCHLORIDE 5 MG: 5 TABLET ORAL at 08:49

## 2020-11-20 RX ADMIN — QUETIAPINE FUMARATE 150 MG: 50 TABLET, EXTENDED RELEASE ORAL at 22:32

## 2020-11-20 NOTE — NURSING NOTE
Continued Stay Note  JOANN Nelson     Patient Name: Arian Persaud  MRN: [id number]  Today's Date: 11/20/2020    Admit Date: 11/14/2020    Discharge Plan    Follow up call to Drew Lopez/guardianship supervisor to determine plan of care. He states CM may seek placement for patient at [facility]/LTC facilities in the area including [place] if necessary. ELYSEM Jasmeet requesting return call for referral. Spoke to Hillary/[place], they have no male beds available. Spoke with [facility] they have no LTC medicaid beds available. Spoke with [facility] - they have medicaid beds available - referral given, she will review and return call - await determination.       Discharge Codes    No documentation.             Bill Trujillo RN

## 2020-11-20 NOTE — PROGRESS NOTES
SERVICE: [facility] HOSPITALIST    CHIEF COMPLAINT: Aspiration PNA    SUBJECTIVE:    Patient spiked low-grade fever overnight, and was started on Unasyn.  He appears much better better today clinically then he had the day prior (where he only shown small improvement from 11/17).  Though he is not as interactive as he was on 11/16.    Unable to obtain further history or review of systems due to patient's mental status    OBJECTIVE:    /88 (BP Location: Right arm, Patient Position: Lying)   Pulse 108   Temp 98.3 °F (36.8 °C) (Axillary)   Resp 24   Wt 49.9 kg (110 lb)   SpO2 90%   BMI 18.30 kg/m²     MEDS/LABS REVIEWED AND ORDERED    ampicillin-sulbactam, 3 g, Intravenous, Q6H  docusate sodium, 100 mg, Oral, BID  donepezil, 5 mg, Oral, Nightly  enoxaparin, 40 mg, Subcutaneous, Q24H  escitalopram, 20 mg, Oral, Daily  losartan, 50 mg, Oral, Daily  memantine, 5 mg, Oral, Q12H  multivitamin with minerals, 1 tablet, Oral, Daily  pantoprazole, 40 mg, Oral, QAM  pravastatin, 10 mg, Oral, Daily  QUEtiapine XR, 150 mg, Oral, Nightly  sodium chloride, 10 mL, Intravenous, Q12H        Physical Exam  Vitals signs and nursing note reviewed.   Constitutional:       General: He is not in acute distress.     Appearance: He is ill-appearing. He is not toxic-appearing or diaphoretic.      Comments: Improved mental status, wakes up to voice attempts to answer questions, but still not quite at his baseline   Eyes:      Pupils: Pupils are equal, round, and reactive to light.   Cardiovascular:      Rate and Rhythm: Normal rate and regular rhythm.   Pulmonary:      Effort: Pulmonary effort is normal. No respiratory distress.      Breath sounds: No wheezing, rhonchi or rales.      Comments: Diminished throughout  Abdominal:      General: Abdomen is flat. Bowel sounds are normal. There is no distension.      Tenderness: There is no abdominal tenderness. There is no guarding.   Musculoskeletal:      Right lower leg:  No edema.      Left lower leg: No edema.   Skin:     General: Skin is warm and dry.   Neurological:      General: No focal deficit present.      Comments: Cerebral palsy   Psychiatric:         Mood and Affect: Mood normal.         Behavior: Behavior normal.         LAB/DIAGNOSTICS:    Lab Results (last 24 hours)     Procedure Component Value Units Date/Time    Procalcitonin [[id number]]  (Normal) Collected: 11/19/20 0650    Specimen: Blood Updated: 11/19/20 0827     Procalcitonin 0.06 ng/mL     Narrative:      Results may be falsely decreased if patient taking Biotin.     Basic Metabolic Panel [[id number]]  (Abnormal) Collected: 11/19/20 0650    Specimen: Blood Updated: 11/19/20 0728     Glucose 93 mg/dL      BUN 10 mg/dL      Creatinine 0.84 mg/dL      Sodium 142 mmol/L      Potassium 3.6 mmol/L      Chloride 109 mmol/L      CO2 22.6 mmol/L      Calcium 9.1 mg/dL      eGFR Non African Amer 93 mL/min/1.73      BUN/Creatinine Ratio 11.9     Anion Gap 10.4 mmol/L     Narrative:      GFR Normal >60  Chronic Kidney Disease <60  Kidney Failure <15      Magnesium [[id number]]  (Normal) Collected: 11/19/20 0650    Specimen: Blood Updated: 11/19/20 0728     Magnesium 1.8 mg/dL     Phosphorus [[id number]]  (Normal) Collected: 11/19/20 0650    Specimen: Blood Updated: 11/19/20 0728     Phosphorus 2.7 mg/dL     CBC & Differential [[id number]]  (Normal) Collected: 11/19/20 0650    Specimen: Blood Updated: 11/19/20 0709    Narrative:      The following orders were created for panel order CBC & Differential.  Procedure                               Abnormality         Status                     ---------                               -----------         ------                     CBC Auto Differential[[id number]]        Normal              Final result                 Please view results for these tests on the individual orders.    CBC Auto Differential [[id number]]  (Normal) Collected: 11/19/20 0650    Specimen: Blood Updated: 11/19/20  0709     WBC 4.89 10*3/mm3      RBC 4.68 10*6/mm3      Hemoglobin 13.3 g/dL      Hematocrit 40.9 %      MCV 87.4 fL      MCH 28.4 pg      MCHC 32.5 g/dL      RDW 12.7 %      RDW-SD 40.7 fl      MPV 8.3 fL      Platelets 245 10*3/mm3      Neutrophil % 64.6 %      Lymphocyte % 21.5 %      Monocyte % 8.8 %      Eosinophil % 4.7 %      Basophil % 0.4 %      Immature Grans % 0.0 %      Neutrophils, Absolute 3.16 10*3/mm3      Lymphocytes, Absolute 1.05 10*3/mm3      Monocytes, Absolute 0.43 10*3/mm3      Eosinophils, Absolute 0.23 10*3/mm3      Basophils, Absolute 0.02 10*3/mm3      Immature Grans, Absolute 0.00 10*3/mm3         Results for orders placed during the hospital encounter of 11/14/20   Adult Transthoracic Echo Complete W/ Cont if Necessary Per Protocol    Narrative · Left ventricular ejection fraction appears to be 61 - 65%. Left   ventricular systolic function is normal.  · Left ventricular diastolic function was normal.  · No significant valvular stenosis or regurgitation noted.  · Estimated right ventricular systolic pressure from tricuspid   regurgitation is normal (<35 mmHg). Calculated right ventricular systolic   pressure from tricuspid regurgitation is 24 mmHg.        No radiology results for the last day      ASSESSMENT/PLAN:    Lactic acidosis w/ ISH due to aspiration pneumonitis  - -Lactate was 10 on admission and improved with fluids  - ISH POA now resolved  - UA negative, chest CT limited by motion but grossly no signs of pneumonia, and white count has been normal.a left shift  -Nutrition modified diet earlier this admission, SLP evaluated today and upgraded, need further assessment as outpatient, and possibly [facility], SLP will continue to follow  - procal still negative, temp of 100.5 spiked last night, started on Unasyn, appears improved today, unclear if that is due to the additional or if natural progression of disease        Essential hypertension  - BP's remain moderately uncontrolled, Already  back on his home losartan   -No further needs for now but will monitor     Baseline Cognitive Deficit  -Continued on home meds     Type II NSTEMI, resolved  - Cards saw earlier in admission, flat trop, echo normal, due to acute illness     Pulmonary nodule  -Needs follow-up chest CT in 6 months    Dispo: He is medically stable for discharge back to his medical care facility, however care facility states that they feel he needs hospice coarseness, and that they will not make a decision on this until Monday, otherwise we will have to obtain SNF placement for him ourselves, see 's note from a today,

## 2020-11-20 NOTE — CONSULTS
Adult Nutrition  Assessment/PES    Patient Name:  Arian Persaud  YOB: 1955  MRN: [id number]  Admit Date:  11/14/2020    Assessment Date:  11/20/2020    Recommend: Clarify liquid order with SLP possible think mentioned yesterday, RN notified.  Pt appears to meet criteria for malnutrition based on poor intake during stay, wt loss over past few months, muscle wasting, and some fat loss.   Clarify care goals re nutrition support, pt not meeting needs likely at present.   It is noted pt doesn't eat well at first in new place but does improve     Reason for Assessment     Row Name 11/20/20 1454          Reason for Assessment    Reason For Assessment  follow-up protocol     Diagnosis  infection/sepsis aspiration pneumonitis, cognitive deficeits, contractures         Nutrition/Diet History     Row Name 11/20/20 1515 11/20/20 2350       Nutrition/Diet History    Typical Food/Fluid Intake  Spoke w RN who reports pt responding & talking some today. Pt pleasant and answers yes to most questions.  Spoke w RN who reports pt can hold spoon but cannot get to mouth. Pt requires assist with feeding. Pt ate small amount for her with some boost this am, CNA finished feeding. Noted staff attempting to get in touch with guardian re: placement etc.          Labs/Tests/Procedures/Meds     Row Name 11/20/20 9392          Labs/Procedures/Meds    Lab Results Reviewed  reviewed     Lab Results Comments  BMP wnl        Diagnostic Tests/Procedures    Diagnostic Test/Procedure Reviewed  reviewed        Medications    Pertinent Medications Reviewed  reviewed     Pertinent Medications Comments  MVT         Physical Findings     Row Name 11/20/20 3856          Physical Findings    Overall Physical Appearance  other (see comments) B Upper & B lower contractures           Nutrition Prescription Ordered     Row Name 11/20/20 1679          Nutrition Prescription PO    Current PO Diet  Pureed     Fluid Consistency  Nectar/syrup thick      Supplement  Boost Plus (Ensure Enlive, Ensure Plus)     Supplement Frequency  3 times a day         Evaluation of Received Nutrient/Fluid Intake     Row Name 11/20/20 1455          Fluid Intake Evaluation    Oral Fluid (mL)  190 ave x 3,11%        PO Evaluation    % PO Intake  0-25% of meals ( ave 20% x 5 meals)           Malnutrition Severity Assessment     Row Name 11/20/20 1517          Malnutrition Severity Assessment    Malnutrition Type  Chronic Disease - Related Malnutrition        Insufficient Energy Intake     Insufficient Energy Intake   < or equal to 50% of est. energy requirement for > or equal to 5d)        Unintentional Weight Loss     Unintentional Weight Loss   Weight loss of 7.5% in three months 12% in 4 months        Muscle Loss    Alta Region  Moderate - slight depression     Clavicle Bone Region  Moderate - some protrusion in females, visible in males     Acromion Bone Region  Moderate - acromion may slightly protrude     Posterior Calf Region  -- mushy, contracted        Fat Loss    Orbital Region   Moderate -  somewhat hollowness, slightly dark circles     Upper Arm Region  -- contracted difficult to assess        Criteria Met (Must meet criteria for severity in at least 2 of these categories: M Wasting, Fat Loss, Fluid, Secondary Signs, Wt. Status, Intake)    Patient meets criteria for   Moderate (non-severe) Malnutrition           Problem/Interventions:  Problem 1     Row Name 11/20/20 1456          Nutrition Diagnoses Problem 1    Problem 1  Predicted Suboptimal Intake     Etiology (related to)  Factors Affecting Nutrition     Signs/Symptoms (evidenced by)  Report/Observation;PO Intake         Problem 2     Row Name 11/20/20 1457          Nutrition Diagnoses Problem 2    Problem 2  Underweight     Etiology (related to)  Factors Affecting Nutrition     Signs/Symptoms (evidenced by)  BMI     BMI  18 - 18.9         Problem 3     Row Name 11/20/20 1524          Nutrition Diagnoses Problem 3     Problem 3  Malnutrition     Etiology (related to)  Medical Diagnosis;Factors Affecting Nutrition     Signs/Symptoms (evidenced by)  Report/Observation malnutrition severity assessment           Intervention Goal     Row Name 11/20/20 1457          Intervention Goal    PO  Increase intake;PO intake (%)     PO Intake %  50 % or greater of meals/supplements     Weight  No significant weight loss         Nutrition Intervention     Row Name 11/20/20 1457          Nutrition Intervention    RD/Tech Action  Follow Tx progress           Education/Evaluation     Row Name 11/20/20 1457          Education    Education  Education not appropriate at this time        Monitor/Evaluation    Monitor  Per protocol;I&O;PO intake;Supplement intake;Pertinent labs;Weight           Electronically signed by:  Wen Maxwell RD  11/20/20 15:24 EST

## 2020-11-20 NOTE — NURSING NOTE
Continued Stay Note   Lucina Ulloa     Patient Name: Arian Persaud  MRN: [id number]  Today's Date: 11/20/2020    Admit Date: 11/14/2020    Discharge Plan     Row Name 11/20/20 1412       Plan    Plan Comments  Spoke with Miri at [facility] concerning admission.  She will eval and acll back but feels like they probably will not take due to developmental delays.  Natty called Monico at [facility] and they will eval for admission.  Will continue to follow    Row Name 11/20/20 1137       Plan    Plan  to be determined    Plan Comments  Unable to reach patients guardian, Beny Simon. Call to [facility] Guardianship office who referred me to Power Agudelo/teodora([phone]) for consent to initialte placement of patient. Spoke with Power Agudelo, situation explained. He states he does not feel the [facility] can refuse to accept patient back, but referred me to Drew Lopez/supervisor ([phone]) to verify. Conversation documented byPower Agudelo and emailed to Beny De La Torre and to this CM. Spoke with Drew Lopez/supervisor - he asks what services they are stating they cannot provide to patient, what is his diagnossis and who I spoke with at [facility]. Provided contact information for Rosette Mei/ @ [facility]. Plan of care per PT/OT/SLP and most recent progress note from Dr. Cummings sent to [email or web address]. CM will continue to follow.        Discharge Codes    No documentation.             Joan Stephens RN

## 2020-11-20 NOTE — NURSING NOTE
Continued Stay Note  JOANN Nelson     Patient Name: Arian Persaud  MRN: [id number]  Today's Date: 11/20/2020    Admit Date: 11/14/2020    Discharge Plan     Row Name 11/20/20 1137       Plan    Plan  to be determined    Plan Comments  Unable to reach patients guardian, Beny Simon. Call to [facility] Guardianship office who referred me to Power Agudelo/teodora([phone]) for consent to initialte placement of patient. Spoke with Power Agudelo, situation explained. He states he does not feel the [facility] can refuse to accept patient back, but referred me to Drew Lopez/supervisor ([phone]) to verify. Conversation documented byPower Agudelo and emailed to Beny De La Torre and to this CM. Spoke with Drew Lopez/supervisor - he asks what services they are stating they cannot provide to patient, what is his diagnossis and who I spoke with at [facility]. Provided contact information for Rosette Mei/ @ [facility]. Plan of care per PT/OT/SLP and most recent progress note from Dr. Cummings sent to [email or web address]. CM will continue to follow.        Discharge Codes    No documentation.             Bill Trujillo RN

## 2020-11-20 NOTE — PLAN OF CARE
Goal Outcome Evaluation:  Plan of Care Reviewed With: patient  Progress: no change  Outcome Summary: Pt occasionaly verbal this shift. Continues on pureed diet and nectar thick liquids. Turned and repositioned frequently. Palak care provided with each incontinent episode. No sign of pain/discomfort. 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 Heels floated. Call light within reach, safety measures in place.

## 2020-11-20 NOTE — PLAN OF CARE
Goal Outcome Evaluation:  Plan of Care Reviewed With: patient  Progress: no change  Outcome Summary: Pt denies any pain and shows no s/s of pain this shift.  Pt has been verbal at times with nurse this shift.  Tolerating pureed diet but still does not have a healthy appetite.  According to caregivers, this is normal for the pt when he is out of his normal environment.  Pt is incontinent of B/B and wears a brief.  Zinc is being utilized for excoriation.  Turned and repositioning around the clock.  Noted with contractures to BUE and BLE.  Continues of offload heels.  Up to chair at this time watching tv.  Continues on IV ATB and tolerating well.  Call light within reach.

## 2020-11-21 PROBLEM — E44.0 MODERATE MALNUTRITION (HCC): Status: ACTIVE | Noted: 2020-11-21

## 2020-11-21 LAB
ANION GAP SERPL CALCULATED.3IONS-SCNC: 9 MMOL/L (ref 5–15)
BASOPHILS # BLD AUTO: 0.02 10*3/MM3 (ref 0–0.2)
BASOPHILS NFR BLD AUTO: 0.4 % (ref 0–1.5)
BUN SERPL-MCNC: 19 MG/DL (ref 8–23)
BUN/CREAT SERPL: 19.4 (ref 7–25)
CALCIUM SPEC-SCNC: 8.8 MG/DL (ref 8.6–10.5)
CHLORIDE SERPL-SCNC: 106 MMOL/L (ref 98–107)
CO2 SERPL-SCNC: 27 MMOL/L (ref 22–29)
CREAT SERPL-MCNC: 0.98 MG/DL (ref 0.76–1.27)
DEPRECATED RDW RBC AUTO: 41.4 FL (ref 37–54)
EOSINOPHIL # BLD AUTO: 0.26 10*3/MM3 (ref 0–0.4)
EOSINOPHIL NFR BLD AUTO: 5 % (ref 0.3–6.2)
ERYTHROCYTE [DISTWIDTH] IN BLOOD BY AUTOMATED COUNT: 12.9 % (ref 12.3–15.4)
GFR SERPL CREATININE-BSD FRML MDRD: 78 ML/MIN/1.73
GLUCOSE SERPL-MCNC: 75 MG/DL (ref 65–99)
HCT VFR BLD AUTO: 36.6 % (ref 37.5–51)
HGB BLD-MCNC: 11.8 G/DL (ref 13–17.7)
IMM GRANULOCYTES # BLD AUTO: 0 10*3/MM3 (ref 0–0.05)
IMM GRANULOCYTES NFR BLD AUTO: 0 % (ref 0–0.5)
LYMPHOCYTES # BLD AUTO: 0.99 10*3/MM3 (ref 0.7–3.1)
LYMPHOCYTES NFR BLD AUTO: 19 % (ref 19.6–45.3)
MCH RBC QN AUTO: 28.3 PG (ref 26.6–33)
MCHC RBC AUTO-ENTMCNC: 32.2 G/DL (ref 31.5–35.7)
MCV RBC AUTO: 87.8 FL (ref 79–97)
MONOCYTES # BLD AUTO: 0.5 10*3/MM3 (ref 0.1–0.9)
MONOCYTES NFR BLD AUTO: 9.6 % (ref 5–12)
NEUTROPHILS NFR BLD AUTO: 3.43 10*3/MM3 (ref 1.7–7)
NEUTROPHILS NFR BLD AUTO: 66 % (ref 42.7–76)
PLATELET # BLD AUTO: 232 10*3/MM3 (ref 140–450)
PMV BLD AUTO: 8.4 FL (ref 6–12)
POTASSIUM SERPL-SCNC: 3.5 MMOL/L (ref 3.5–5.2)
RBC # BLD AUTO: 4.17 10*6/MM3 (ref 4.14–5.8)
SODIUM SERPL-SCNC: 142 MMOL/L (ref 136–145)
WBC # BLD AUTO: 5.2 10*3/MM3 (ref 3.4–10.8)

## 2020-11-21 PROCEDURE — 85025 COMPLETE CBC W/AUTO DIFF WBC: CPT | Performed by: INTERNAL MEDICINE

## 2020-11-21 PROCEDURE — 25010000003 AMPICILLIN-SULBACTAM PER 1.5 G: Performed by: INTERNAL MEDICINE

## 2020-11-21 PROCEDURE — 99231 SBSQ HOSP IP/OBS SF/LOW 25: CPT | Performed by: HOSPITALIST

## 2020-11-21 PROCEDURE — 92526 ORAL FUNCTION THERAPY: CPT

## 2020-11-21 PROCEDURE — 80048 BASIC METABOLIC PNL TOTAL CA: CPT | Performed by: INTERNAL MEDICINE

## 2020-11-21 PROCEDURE — 94799 UNLISTED PULMONARY SVC/PX: CPT

## 2020-11-21 PROCEDURE — 25010000002 ENOXAPARIN PER 10 MG: Performed by: INTERNAL MEDICINE

## 2020-11-21 RX ORDER — ACETAMINOPHEN 650 MG/1
650 SUPPOSITORY RECTAL ONCE
Status: COMPLETED | OUTPATIENT
Start: 2020-11-21 | End: 2020-11-21

## 2020-11-21 RX ORDER — ACETAMINOPHEN 650 MG/1
SUPPOSITORY RECTAL
Status: DISPENSED
Start: 2020-11-21 | End: 2020-11-22

## 2020-11-21 RX ADMIN — SODIUM CHLORIDE, PRESERVATIVE FREE 10 ML: 5 INJECTION INTRAVENOUS at 22:16

## 2020-11-21 RX ADMIN — DOCUSATE SODIUM 100 MG: 100 CAPSULE ORAL at 22:16

## 2020-11-21 RX ADMIN — AMPICILLIN SODIUM AND SULBACTAM SODIUM 3 G: 2; 1 INJECTION, POWDER, FOR SOLUTION INTRAMUSCULAR; INTRAVENOUS at 01:01

## 2020-11-21 RX ADMIN — MULTIPLE VITAMINS W/ MINERALS TAB 1 TABLET: TAB at 09:14

## 2020-11-21 RX ADMIN — AMPICILLIN SODIUM AND SULBACTAM SODIUM 3 G: 2; 1 INJECTION, POWDER, FOR SOLUTION INTRAMUSCULAR; INTRAVENOUS at 11:38

## 2020-11-21 RX ADMIN — ACETAMINOPHEN 650 MG: 650 SUPPOSITORY RECTAL at 15:54

## 2020-11-21 RX ADMIN — AMPICILLIN SODIUM AND SULBACTAM SODIUM 3 G: 2; 1 INJECTION, POWDER, FOR SOLUTION INTRAMUSCULAR; INTRAVENOUS at 06:45

## 2020-11-21 RX ADMIN — QUETIAPINE FUMARATE 150 MG: 50 TABLET, EXTENDED RELEASE ORAL at 22:14

## 2020-11-21 RX ADMIN — DONEPEZIL HYDROCHLORIDE 5 MG: 5 TABLET ORAL at 22:15

## 2020-11-21 RX ADMIN — PRAVASTATIN SODIUM 10 MG: 20 TABLET ORAL at 09:14

## 2020-11-21 RX ADMIN — SODIUM CHLORIDE, PRESERVATIVE FREE 10 ML: 5 INJECTION INTRAVENOUS at 09:15

## 2020-11-21 RX ADMIN — MEMANTINE HYDROCHLORIDE 5 MG: 5 TABLET ORAL at 22:15

## 2020-11-21 RX ADMIN — MEMANTINE HYDROCHLORIDE 5 MG: 5 TABLET ORAL at 09:15

## 2020-11-21 RX ADMIN — ESCITALOPRAM OXALATE 20 MG: 10 TABLET, FILM COATED ORAL at 09:14

## 2020-11-21 RX ADMIN — ENOXAPARIN SODIUM 40 MG: 40 INJECTION SUBCUTANEOUS at 18:58

## 2020-11-21 RX ADMIN — PANTOPRAZOLE SODIUM 40 MG: 40 TABLET, DELAYED RELEASE ORAL at 09:14

## 2020-11-21 RX ADMIN — AMPICILLIN SODIUM AND SULBACTAM SODIUM 3 G: 2; 1 INJECTION, POWDER, FOR SOLUTION INTRAMUSCULAR; INTRAVENOUS at 22:17

## 2020-11-21 RX ADMIN — AMPICILLIN SODIUM AND SULBACTAM SODIUM 3 G: 2; 1 INJECTION, POWDER, FOR SOLUTION INTRAMUSCULAR; INTRAVENOUS at 15:54

## 2020-11-21 RX ADMIN — LOSARTAN POTASSIUM 50 MG: 50 TABLET, FILM COATED ORAL at 09:14

## 2020-11-21 NOTE — PROGRESS NOTES
"Hospitalist Team      Patient Care Team:  Hyacinth Elizabeth MD as PCP - General (Internal Medicine)        Chief Complaint:  Aspiration pneumonia    Subjective    Interval History and ROS:     No fever since midnight 11/19.  VS are stable.  He is on unasyn still.  Nursing states he has been verbal at times.  Incontinent of B/B. Zinc is being used for excoriation      Objective    Vital Signs  Temp:  [97.5 °F (36.4 °C)-99 °F (37.2 °C)] 99 °F (37.2 °C)  Heart Rate:  [84-97] 97  Resp:  [18-20] 18  BP: (125-156)/(69-86) 146/78  Oxygen Therapy  SpO2: 95 %  Pulse Oximetry Type: Continuous  Device (Oxygen Therapy): room air}  Flowsheet Rows      First Filed Value   Admission Height  -- [165.1 cm (65\")] Documented at 11/16/2020 1229   Admission Weight  49.9 kg (110 lb) Documented at 11/14/2020 1745          Body mass index is 18.3 kg/m².      Physical Exam:    Physical Exam  Cardiovascular:      Rate and Rhythm: Normal rate and regular rhythm.   Pulmonary:      Effort: Pulmonary effort is normal.      Breath sounds: Normal breath sounds.   Skin:     General: Skin is warm and dry.   Neurological:      Comments: Cognitive defect         Results Review:     I reviewed the patient's new clinical results.    Lab Results (last 24 hours)     Procedure Component Value Units Date/Time    Basic Metabolic Panel [[id number]]  (Normal) Collected: 11/20/20 0520    Specimen: Blood Updated: 11/20/20 0609     Glucose 69 mg/dL      BUN 14 mg/dL      Creatinine 0.93 mg/dL      Sodium 142 mmol/L      Potassium 3.9 mmol/L      Chloride 106 mmol/L      CO2 24.0 mmol/L      Calcium 8.6 mg/dL      eGFR Non African Amer 82 mL/min/1.73      BUN/Creatinine Ratio 15.1     Anion Gap 12.0 mmol/L     Narrative:      GFR Normal >60  Chronic Kidney Disease <60  Kidney Failure <15      CBC & Differential [[id number]]  (Abnormal) Collected: 11/20/20 0520    Specimen: Blood Updated: 11/20/20 0543    Narrative:      The following orders were created for " panel order CBC & Differential.  Procedure                               Abnormality         Status                     ---------                               -----------         ------                     CBC Auto Differential[[id number]]        Abnormal            Final result                 Please view results for these tests on the individual orders.    CBC Auto Differential [[id number]]  (Abnormal) Collected: 11/20/20 0520    Specimen: Blood Updated: 11/20/20 0543     WBC 5.73 10*3/mm3      RBC 4.39 10*6/mm3      Hemoglobin 12.6 g/dL      Hematocrit 39.6 %      MCV 90.2 fL      MCH 28.7 pg      MCHC 31.8 g/dL      RDW 13.0 %      RDW-SD 41.4 fl      MPV 8.4 fL      Platelets 240 10*3/mm3      Neutrophil % 71.9 %      Lymphocyte % 15.7 %      Monocyte % 8.6 %      Eosinophil % 3.3 %      Basophil % 0.3 %      Immature Grans % 0.2 %      Neutrophils, Absolute 4.12 10*3/mm3      Lymphocytes, Absolute 0.90 10*3/mm3      Monocytes, Absolute 0.49 10*3/mm3      Eosinophils, Absolute 0.19 10*3/mm3      Basophils, Absolute 0.02 10*3/mm3      Immature Grans, Absolute 0.01 10*3/mm3      nRBC 0.0 /100 WBC           Imaging Results (Last 24 Hours)     ** No results found for the last 24 hours. **          Xray not reviewed personally by physician.      ECG not reviewed personally by physician  ECG/EMG Results (most recent)     Procedure Component Value Units Date/Time    ECG 12 Lead [[id number]] Collected: 11/14/20 1359     Updated: 11/14/20 1747     QT Interval 337 ms     Narrative:      HEART RATE= 99  bpm  RR Interval= 604  ms  NJ Interval= 121  ms  P Horizontal Axis= -2  deg  P Front Axis= 59  deg  QRSD Interval= 87  ms  QT Interval= 337  ms  QRS Axis= -49  deg  T Wave Axis= 60  deg  - NORMAL ECG -  Sinus rhythm  When compared with ECG of 04-Dec-2018 12:21:54,  No significant change  Electronically Signed By: Burke Garcia ([facility]) 14-Nov-2020 17:45:03  Date and Time of Study: 2020-11-14 13:59:52    Adult Transthoracic  Echo Complete W/ Cont if Necessary Per Protocol [[id number]] Collected: 11/16/20 0745     Updated: 11/16/20 0853     BSA 1.5 m^2      IVSd 0.58 cm      LVIDd 4.1 cm      LVIDs 2.5 cm      LVPWd 0.6 cm      IVS/LVPW 0.97     FS 39.2 %      EDV(Teich) 72.5 ml      ESV(Teich) 21.7 ml      EF(Teich) 70.1 %      EDV(cubed) 66.9 ml      ESV(cubed) 15.1 ml      EF(cubed) 77.5 %      LV mass(C)d 65.0 grams      LV mass(C)dI 42.3 grams/m^2      SV(Teich) 50.9 ml      SI(Teich) 33.1 ml/m^2      SV(cubed) 51.9 ml      SI(cubed) 33.8 ml/m^2      Ao root diam 3.0 cm      Ao root area 7.1 cm^2      asc Aorta Diam 2.7 cm      LVOT diam 1.9 cm      LVOT area 2.8 cm^2      LVOT area(traced) 2.8 cm^2      LVLd ap4 6.6 cm      EDV(MOD-sp4) 73.6 ml      LVLs ap4 5.1 cm      ESV(MOD-sp4) 21.9 ml      EF(MOD-sp4) 70.2 %      LVLd ap2 6.3 cm      EDV(MOD-sp2) 54.1 ml      LVLs ap2 4.5 cm      ESV(MOD-sp2) 12.1 ml      EF(MOD-sp2) 77.6 %      SV(MOD-sp4) 51.7 ml      SI(MOD-sp4) 33.7 ml/m^2      SV(MOD-sp2) 42.0 ml      SI(MOD-sp2) 27.4 ml/m^2      Ao root area (BSA corrected) 2.0     LV Modi Vol (BSA corrected) 48.0 ml/m^2      LV Sys Vol (BSA corrected) 14.3 ml/m^2      TAPSE (>1.6) 2.0 cm      EF(MOD-bp) 73.1 %      MV E max royal 105.0 cm/sec      MV A max royal 117.0 cm/sec      MV E/A 0.9     MV V2 mean 84.9 cm/sec      MV mean PG 3.0 mmHg      MV V2 VTI 22.1 cm      MVA(VTI) 2.6 cm^2      MV P1/2t max royal 96.2 cm/sec      MV P1/2t 58.2 msec      MVA(P1/2t) 3.8 cm^2      MV dec slope 484.0 cm/sec^2      MV dec time 166 sec      Ao V2 mean 82.2 cm/sec      Ao mean PG 3.0 mmHg      Ao mean PG (full) 1.0 mmHg      Ao V2 VTI 21.5 cm      PRISCILLA(I,A) 2.7 cm^2      PRISCILLA(I,D) 2.7 cm^2      LV V1 mean PG 2.0 mmHg      LV V1 mean 63.8 cm/sec      LV V1 VTI 20.2 cm      SV(Ao) 152.0 ml      SI(Ao) 99.1 ml/m^2      SV(LVOT) 57.3 ml      SI(LVOT) 37.3 ml/m^2      TR max royal 227.5 cm/sec      MVA P1/2T LCG 2.3 cm^2      RV Base 3.1 cm      Lat Peak E' Royal  9.4 cm/sec      Med Peak E' Royal 8.7 cm/sec       CV ECHO GIO - BZI_BMI 18.3 kilograms/m^2      [facility] ECHO GIO - BSA(HAYCOCK) 1.5 m^2       CV ECHO GIO - BZI_METRIC_WEIGHT 49.9 kg       CV ECHO GIO - BZI_METRIC_HEIGHT 165.1 cm      Avg E/e' ratio 11.60     Target HR (85%) 134 bpm      Max. Pred. HR (100%) 158 bpm      LA Volume Index 17.0 mL/m2      Ao pk royal 112.0 cm/sec      LV V1 max PG 4.0 mmHg      LV V1 max 96.0 cm/sec      RAP systole 3 mmHg      RVSP(TR) 24 mmHg      Ao max PG 5 mmHg     Narrative:      · Left ventricular ejection fraction appears to be 61 - 65%. Left   ventricular systolic function is normal.  · Left ventricular diastolic function was normal.  · No significant valvular stenosis or regurgitation noted.  · Estimated right ventricular systolic pressure from tricuspid   regurgitation is normal (<35 mmHg). Calculated right ventricular systolic   pressure from tricuspid regurgitation is 24 mmHg.       ECG 12 Lead [[id number]] Collected: 11/16/20 0924     Updated: 11/16/20 1952     QT Interval 366 ms     Narrative:      HEART RATE= 97  bpm  RR Interval= 616  ms  MA Interval= 116  ms  P Horizontal Axis= 26  deg  P Front Axis= 81  deg  QRSD Interval= 76  ms  QT Interval= 366  ms  QRS Axis= -41  deg  T Wave Axis= 53  deg  - OTHERWISE NORMAL ECG -  Sinus rhythm  Left axis deviation  Low voltage, extremity leads  NO SIGNIFICANT CHANGE FROM PREVIOUS ECG  Electronically Signed By: Luis A Patel ([facility]) 16-Nov-2020 19:45:47  Date and Time of Study: 2020-11-16 09:24:45          Medication Review:   I have reviewed the patient's current medication list    Current Facility-Administered Medications:   •  acetaminophen (TYLENOL) tablet 650 mg, 650 mg, Oral, Q6H PRN, Juan Reyes MD  •  ampicillin-sulbactam 3 g/100 mL 0.9% NS (MBP), 3 g, Intravenous, Q6H, Elvi Cummings MD, Last Rate: 100 mL/hr at 11/20/20 1738, 3 g at 11/20/20 1738  •  docusate sodium (COLACE) capsule 100 mg, 100 mg, Oral,  BID, Juan Reyes MD, 100 mg at 11/20/20 0849  •  donepezil (ARICEPT) tablet 5 mg, 5 mg, Oral, Nightly, Juan Reyes MD, 5 mg at 11/19/20 2041  •  enoxaparin (LOVENOX) syringe 40 mg, 40 mg, Subcutaneous, Q24H, Juan Reyes MD, 40 mg at 11/19/20 1815  •  escitalopram (LEXAPRO) tablet 20 mg, 20 mg, Oral, Daily, Juan Reyes MD, 20 mg at 11/20/20 0849  •  losartan (COZAAR) tablet 50 mg, 50 mg, Oral, Daily, Juan Reyes MD, 50 mg at 11/20/20 0849  •  memantine (NAMENDA) tablet 5 mg, 5 mg, Oral, Q12H, Juan Reyes MD, 5 mg at 11/20/20 0849  •  multivitamin with minerals 1 tablet, 1 tablet, Oral, Daily, Juan Reyes MD, 1 tablet at 11/20/20 0849  •  pantoprazole (PROTONIX) EC tablet 40 mg, 40 mg, Oral, QAM, Juan Reyes MD, 40 mg at 11/20/20 0609  •  potassium phosphate 45 mmol in sodium chloride 0.9 % 500 mL infusion, 45 mmol, Intravenous, PRN **OR** potassium phosphate 30 mmol in sodium chloride 0.9 % 250 mL infusion, 30 mmol, Intravenous, PRN **OR** potassium phosphate 15 mmol in sodium chloride 0.9 % 100 mL infusion, 15 mmol, Intravenous, PRN, 15 mmol at 11/18/20 1159 **OR** sodium phosphates 45 mmol in sodium chloride 0.9 % 500 mL IVPB, 45 mmol, Intravenous, PRN **OR** sodium phosphates 30 mmol in sodium chloride 0.9 % 250 mL IVPB, 30 mmol, Intravenous, PRN **OR** sodium phosphates 15 mmol in sodium chloride 0.9 % 250 mL IVPB, 15 mmol, Intravenous, PRN, Elvi Cummings MD  •  pravastatin (PRAVACHOL) tablet 10 mg, 10 mg, Oral, Daily, Juan Reyes MD, 10 mg at 11/20/20 0849  •  QUEtiapine fumarate ER (SEROquel XR) tablet 150 mg, 150 mg, Oral, Nightly, Juan Reyes MD, 150 mg at 11/19/20 2041  •  sodium chloride 0.9 % flush 10 mL, 10 mL, Intravenous, PRN, Juan Reyes MD  •  sodium chloride 0.9 % flush 10 mL, 10 mL, Intravenous, Q12H, Juan Ryees MD, 10 mL at 11/20/20 0850  •  sodium chloride 0.9 % infusion 40 mL, 40 mL, Intravenous, PRN, Juan Reyes  MD      Assessment/Plan     Lactic acidosis     ISH    Aspiration Pneumonia    Fever    Essential Hypertension    Cognitive Defect    Type 2 NSTEMI    Pulmonary Nodule   Follow up with chest CT in 6 months    Resident of [place] and he is a wiley of the [facility]    are working with the [facility] guardian and her supervisor   They will let us know when and where this patient could be dispositioned.    Plan for disposition:  Placement is our goal.    Gin Mckinney DO  11/20/20  20:39 EST      Time: [id number]

## 2020-11-21 NOTE — THERAPY TREATMENT NOTE
Acute Care - Speech Language Pathology   Swallow Treatment Note  Lucina Ulloa     Patient Name: Arian Persaud  : 1955  MRN: [id number]  Today's Date: 2020  Onset of Illness/Injury or Date of Surgery: 20     Referring Physician: Dr. Cummings      Admit Date: 2020    Visit Dx:     ICD-10-CM ICD-9-CM   1. Lactic acidosis  E87.2 276.2   2. Acute kidney injury (CMS/HCC)  N17.9 584.9   3. Oropharyngeal dysphagia  R13.12 787.22     Patient Active Problem List   Diagnosis   • Sepsis (CMS/HCC)   • Lactic acidosis   • Mental retardation   • Essential hypertension   • GERD (gastroesophageal reflux disease)   • Elevated troponin   • Dementia (CMS/HCC)     Past Medical History:   Diagnosis Date   • Coronary artery disease    • Injury of back    • Kidney stones    • Mental retardation      Past Surgical History:   Procedure Laterality Date   • KIDNEY SURGERY     • TONSILLECTOMY          SWALLOW EVALUATION (last 72 hours)      SLP Adult Swallow Evaluation     Row Name 20 0942 20 1206                Rehab Evaluation    Document Type  therapy note (daily note)  -AD  evaluation  -AD       Total Evaluation Minutes, SLP  42  -AD  41  -AD       Subjective Information  no complaints  -AD  complains of;pain legs when positioned upright in bed; appears to resolve  -AD       Patient Observations  alert  -AD  alert;agree to therapy;poorly cooperative  -AD       Patient/Family/Caregiver Comments/Observations  Pt seen upright in chair during breakfast. Positioned with pillows to maintain upright due to leg contracturs. Very limited verbal, will answer yes/no at times.  -AD  Pt in bed leaning to left side. SLP repositioned to near 90 degrees with pillow placed under left leg in order to maintain position on his back.   -AD       Care Plan Review  care plan/treatment goals reviewed;risks/benefits reviewed;current/potential barriers reviewed  -AD  evaluation/treatment results reviewed;risks/benefits  reviewed;current/potential barriers reviewed;patient/other agree to care plan pt does not demonstrate understanding.  -AD       Care Plan Review, Other Participant(s)  other (see comments) RN/PCA  -AD  --       Patient Effort  fair  -AD  fair  -AD       Comment  Pt with limited po intake. Requires consistent cues to swallow and to take bites. Limited mouth opening.   -AD  Pt appears to be anxious in new situation/around new people. No caregivers present at this time. He refuses to take po at times but able to coax and get minimal amounts of thins, puree and one minced piece of pear.   -AD       Symptoms Noted During/After Treatment  none  -AD  increased pain  -AD       Symptoms Noted, Comment  --  with upright positioning; appears to resolve after being upright a few minutes.  -AD          General Information    Patient Profile Reviewed  --  yes  -AD       Pertinent History Of Current Problem  --  Pt is a 63 y/o resident of [facility] (formerly [facility] He was admitted for high lactic acid and ISH. Recent hospitalization for pyelonephritis. Concerns for aspiration reported and swallow eval ordered. Pt on prior diet of soft bite size and thins. Currently placed on puree with nectar thick liquids here. Hx significant for CAD, kidney stones, intellectual disability, tonsillectomy.  -AD       Current Method of Nutrition  --  pureed;nectar/syrup-thick liquids  -AD       Precautions/Limitations, Vision  --  other (see comments) unale to assess  -AD       Precautions/Limitations, Hearing  --  WFL;for purposes of eval  -AD       Prior Level of Function-Communication  --  other (see comments);cognitive-linguistic impairment intellectual disability  -AD       Prior Level of Function-Swallowing  --  mechanical soft textures;thin liquids as best as can be gleaned from chart review  -AD       Plans/Goals Discussed with  --  patient does not agree or disagree  -AD       Barriers to Rehab  --  cognitive  status;contractures  -AD       Patient's Goals for Discharge  --  patient did not state  -AD       Family Goals for Discharge  --  other (see comments) no caregivers present  -AD          Pain Scale: FACES Pre/Post-Treatment    Pre/Posttreatment Pain Comment  No pain indicated during this session. Pt does not verbalize any pain.   -AD  Pain with repositioning that appears to resolve with minimal time. Pt does not report this pain or give a level.   -AD          Oral Motor Structure and Function    Oral Lesions or Structural Abnormalities and/or variants  --  Appears to have a coating on his tongue but difficult to fully assess due to pt not allowing for full viewing  -AD       Dentition Assessment  --  other (see comments) unable to assess; no teeth seen  -AD       Secretion Management  --  WNL/WFL;other (see comments) no gross pooling noted in mouth  -AD       Mucosal Quality  --  other (see comments) not dry appearing in front of mouth  -AD       Gag Response  --  other (see comments) unable to assess  -AD       Volitional Swallow  --  unable to elicit  -AD       Volitional Cough  --  unable to elicit  -AD          Oral Musculature and Cranial Nerve Assessment    Oral Motor General Assessment  --  unable to assess  -AD       Oral Motor, Comment  --  Pt does not allow for oral motor exam. He keeps his mouth tightly closed. Symmetrical lip closure noted w/adequate strength. Voice is very soft/decreased intensity. Difficult to undestand at times.   -AD          General Eating/Swallowing Observations    Respiratory Support Currently in Use  --  room air  -AD       Eating/Swallowing Skills  --  fed by SLP  -AD       Positioning During Eating  --  upright in bed;contracted;needs frequent re-positioning near 90 degrees w/legs contracted and left hand/arm  -AD       Utensils Used  --  spoon;straw  -AD       Consistencies Trialed  --  soft textures;chopped;pureed;thin liquids  -AD          Respiratory    Respiratory Status   --  WFL;room air;during swallowing/eating  -AD          Clinical Swallow Eval    Oral Prep Phase  --  impaired  -AD       Oral Transit  --  impaired  -AD       Oral Residue  --  WFL  -AD       Pharyngeal Phase  --  no overt signs/symptoms of pharyngeal impairment  -AD       Esophageal Phase  --  unremarkable  -AD       Clinical Swallow Evaluation Summary  --  Pt presents with a moderate oral and mild pharyngeal dysphagia w/o overt s/s of aspiration. Pt with limited participation in swallow eval. Able to get him to drink from a straw w/no clinical s/s of aspiration noted. Oral holding as pt did not want to take any po. Attempted puree w/limited intake and again oral holding. Small diced pear given with oral holding and then pt swallowed whole. Very limited exam, but no clinical s/s of aspiration on thins. Recommend to upgrade diet to thins, but keep puree due to no attempt to chew and swallowing of foods whole. Mild anterior loss noted on both left and right with trials of thins and when pear given.   -AD          Oral Prep Concerns    Oral Prep Concerns  --  anterior loss;incomplete bolus preparation;oral holding  -AD       Oral Holding  --  thin;pudding;mechanical soft;all consistencies  -AD       Anterior Loss  --  thin;mixed consistencies pear juice  -AD       Incomplete Bolus Preparation  --  mechanical soft;mixed consistencies  -AD       Oral Prep Concerns, Comment  --  pt with mild anterior loss out of both right and left side, felt to be due to oral holding of liquids, puree and mechanical soft items. Feel holding is in oral cavity as pt does not want to take any po at this time.   -AD          Oral Transit Concerns    Oral Transit Concerns  --  increased oral transit time  -AD       Increased Oral Transit Time  --  thin;pudding;mixed consistencies  -AD       Oral Transit Concerns, Comment  --  Increased time due to decrease oral prep and oral holding.   -AD          Oral Residue Concerns    Oral Residue  Concerns  --  other (see comments)  -AD       Oral Residue Concerns, Comment  --  No residue after the swallow, but pt requesting to remove pear bolus, but made no effort to spit it out or to open his mouth in order for SLP to remove. Eventually swallowed the piece of pear.   -AD          Clinical Impression    Daily Summary of Progress (SLP)  unable to show any progress toward functional goals  -AD  --       SLP Swallowing Diagnosis  moderate;oral dysphagia;mild;pharyngeal dysphagia  -AD  moderate;oral dysphagia;mild;pharyngeal dysphagia  -AD       Functional Impact  risk of aspiration/pneumonia;risk of malnutrition;risk of dehydration  -AD  risk of malnutrition;risk of dehydration  -AD       Rehab Potential/Prognosis, Swallowing  re-evaluate goals as necessary  -AD  re-evaluate goals as necessary  -AD       Swallow Criteria for Skilled Therapeutic Interventions Met  demonstrates skilled criteria  -AD  demonstrates skilled criteria  -AD       Plan for Continued Treatment (SLP)  Pt continues with at least moderate oropharyngeal dysphagia. He is able to take small amounts of po from spoon with significant oral holding. Able to swallow with consistent verbal prompts. SLP with trial of water from small cup sip with immediate coughing. Trials discontinued and nectar thick liquids given. Pt only opens mouth slightly and small bites of puree and nectar thick liquids given. Minimal po taken over session with, again, consistent verbal prompts for oral clearance. Unable to view mouth for full clearance. Pt remainedin upright position after meal and able to perform 3 subsequent swallows following cues to clear oral cavity as best as he could. Will follow up during stay. Feel MBS is indicated but unsure of ability to complete due to oral holding and refusal to take po at times.   -AD  --          Recommendations    Therapy Frequency (Swallow)  PRN  -AD  PRN  -AD       Predicted Duration Therapy Intervention (Days)  until  discharge  -AD  3 days  -AD       SLP Diet Recommendation  puree;nectar thick liquids  -AD  puree;thin liquids  -AD       Recommended Diagnostics  VFSS ([facility] [place] if pt able to participate  -AD  --       Recommended Precautions and Strategies  upright posture during/after eating;small bites of food and sips of liquid;alternate between small bites of food and sips of liquid;check mouth frequently for oral residue/pocketing;1:1 supervision;fatigue precautions;general aspiration precautions  -AD  upright posture during/after eating;small bites of food and sips of liquid;alternate between small bites of food and sips of liquid;check mouth frequently for oral residue/pocketing  -AD       Oral Care Recommendations  Oral Care before breakfast, after meals and PRN  -AD  Oral Care before breakfast, after meals and PRN  -AD       SLP Rec. for Method of Medication Administration  meds crushed;with pudding or applesauce;as tolerated  -AD  meds crushed;with pudding or applesauce;as tolerated  -AD       Monitor for Signs of Aspiration  yes;cough;notify SLP if any concerns  -AD  no;notify SLP if any concerns  -AD       Anticipated Discharge Disposition (SLP)  extended care facility  -AD  community-based residential facility (CBRF)  -AD          Swallow Goals (SLP)    Oral Nutrition/Hydration Goal Selection (SLP)  --  oral nutrition/hydration, SLP goal 1  -AD          Oral Nutrition/Hydration Goal 1 (SLP)    Oral Nutrition/Hydration Goal 1, SLP  Pt will tolerate a puree diet with thin liquids w/o oral residue or clinical s/s of aspiration.   -AD  Pt will tolerate a puree diet with thin liquids w/o oral residue or clinical s/s of aspiration.   -AD       Time Frame (Oral Nutrition/Hydration Goal 1, SLP)  short term goal (STG);3 days  -AD  short term goal (STG);3 days  -AD       Barriers (Oral Nutrition/Hydration Goal 1, SLP)  cognitive status  -AD  cognitive status  -AD       Progress/Outcomes (Oral Nutrition/Hydration Goal 1, SLP)   unable to make needed progress;goal not met;goal revised this date will revise to nectar thick liquids and puree  -AD  other (see comments) New  -AD         User Key  (r) = Recorded By, (t) = Taken By, (c) = Cosigned By    Initials Name Effective Dates    Micaela Thacker, MS CCC-SLP 08/09/20 -           EDUCATION  The patient has been educated in the following areas:   Modified Diet Instruction. Pt is unable to demonstrate understanding of new learning.     SLP Recommendation and Plan  SLP Swallowing Diagnosis: moderate, oral dysphagia, mild, pharyngeal dysphagia  SLP Diet Recommendation: puree, nectar thick liquids  Recommended Precautions and Strategies: upright posture during/after eating, small bites of food and sips of liquid, alternate between small bites of food and sips of liquid, check mouth frequently for oral residue/pocketing, 1:1 supervision, fatigue precautions, general aspiration precautions  SLP Rec. for Method of Medication Administration: meds crushed, with pudding or applesauce, as tolerated  Monitor for Signs of Aspiration: yes, cough, notify SLP if any concerns  Recommended Diagnostics: VFSS ([facility] [place]if [place] able to participate)  Swallow Criteria for Skilled Therapeutic Interventions Met: demonstrates skilled criteria  Anticipated Discharge Disposition (SLP): extended care facility  Rehab Potential/Prognosis, Swallowing: re-evaluate goals as necessary  Therapy Frequency (Swallow): PRN  Predicted Duration Therapy Intervention (Days): until discharge     Daily Summary of Progress (SLP): unable to show any progress toward functional goals    Plan for Continued Treatment (SLP): Pt continues with at least moderate oropharyngeal dysphagia. He is able to take small amounts of po from spoon with significant oral holding. Able to swallow with consistent verbal prompts. SLP with trial of water from small cup sip with immediate coughing. Trials discontinued and nectar thick liquids given. Pt only opens  mouth slightly and small bites of puree and nectar thick liquids given. Minimal po taken over session with, again, consistent verbal prompts for oral clearance. Unable to view mouth for full clearance. Pt remainedin upright position after meal and able to perform 3 subsequent swallows following cues to clear oral cavity as best as he could. Will follow up during stay. Feel MBS is indicated but unsure of ability to complete due to oral holding and refusal to take po at times.               Plan of Care Reviewed With: patient, other (see comments)(RN, Rain)  Outcome Summary: SLP: Reassessed swallowing at breakfast. Pt with significant oral holding of food and liquids and requires consistent prompts to clear. He demonstrates coughing on one trial of thins. Recommend to continue with puree diet at this time and nectar thick liquids. Total supervision and feed meals. Will continue to follow. Would benefit from MBS, but unsure if able to complete due to severe holding of food. Will reassess on Monday.    SLP GOALS     Row Name 11/21/20 0942 11/19/20 1206          Oral Nutrition/Hydration Goal 1 (SLP)    Oral Nutrition/Hydration Goal 1, SLP  Pt will tolerate a puree diet with thin liquids w/o oral residue or clinical s/s of aspiration.   -AD  Pt will tolerate a puree diet with thin liquids w/o oral residue or clinical s/s of aspiration.   -AD     Time Frame (Oral Nutrition/Hydration Goal 1, SLP)  short term goal (STG);3 days  -AD  short term goal (STG);3 days  -AD     Barriers (Oral Nutrition/Hydration Goal 1, SLP)  cognitive status  -AD  cognitive status  -AD     Progress/Outcomes (Oral Nutrition/Hydration Goal 1, SLP)  unable to make needed progress;goal not met;goal revised this date will revise to nectar thick liquids and puree  -AD  other (see comments) New  -AD       User Key  (r) = Recorded By, (t) = Taken By, (c) = Cosigned By    Initials Name Provider Type    Micaela Thacker MS CCC-SLP Speech and Language  Pathologist             Time Calculation:   Time Calculation- SLP     Row Name 11/21/20 1245             Time Calculation- SLP    SLP Start Time  0900  -AD      SLP Stop Time  0942  -AD      SLP Time Calculation (min)  42 min  -AD      Total Timed Code Minutes- SLP  0 minute(s)  -AD      SLP Non-Billable Time (min)  0 min  -AD      TCU Minutes- SLP  0 min  -AD      SLP Received On  11/21/20  -AD        User Key  (r) = Recorded By, (t) = Taken By, (c) = Cosigned By    Initials Name Provider Type    AD Micaela Childers, MS CCC-SLP Speech and Language Pathologist          Therapy Charges for Today     Code Description Service Date Service Provider Modifiers Qty    [id number] HC ST TREATMENT SWALLOW 3 11/21/2020 Micaela Childers MS CCC-SLP GN 1               Micaela Childers MS CCC-SLP  11/21/2020

## 2020-11-21 NOTE — PLAN OF CARE
Goal Outcome Evaluation:  Plan of Care Reviewed With: patient  Progress: improving  Outcome Summary: Pt slept well throughout the night; has u/l contractures; poor speech; says he understands us but has a hard time answering.He only took 1/2 of his med tonight, takes meds crushed in A. sauce. Pt refused Lovenox

## 2020-11-21 NOTE — PROGRESS NOTES
"Hospitalist Team      Patient Care Team:  Hyacinth Elizabeth MD as PCP - General (Internal Medicine)        Chief Complaint:  Aspiration Pneumonitis    Subjective    Interval History and ROS:     Afebrile and VS stable.  He is on unasyn still.  Awaiting placement/ state and [facility].  Discussed with nursing and discharge planner      Objective    Vital Signs  Temp:  [97.5 °F (36.4 °C)-99 °F (37.2 °C)] 98.4 °F (36.9 °C)  Heart Rate:  [87-97] 87  Resp:  [18-20] 18  BP: (125-148)/(69-85) 144/85  Oxygen Therapy  SpO2: 95 %  Pulse Oximetry Type: Continuous  Device (Oxygen Therapy): room air}  Flowsheet Rows      First Filed Value   Admission Height  -- [165.1 cm (65\")] Documented at 11/16/2020 1229   Admission Weight  49.9 kg (110 lb) Documented at 11/14/2020 1745          Body mass index is 18.3 kg/m².      Physical Exam:    Physical Exam  Vitals signs and nursing note reviewed.   Constitutional:       Comments: Patient is comfortable.   Cardiovascular:      Rate and Rhythm: Normal rate and regular rhythm.   Pulmonary:      Effort: Pulmonary effort is normal.      Breath sounds: Normal breath sounds.         Results Review:     I reviewed the patient's new clinical results.    Lab Results (last 24 hours)     Procedure Component Value Units Date/Time    Basic Metabolic Panel [[id number]]  (Normal) Collected: 11/21/20 0539    Specimen: Blood Updated: 11/21/20 0559     Glucose 75 mg/dL      BUN 19 mg/dL      Creatinine 0.98 mg/dL      Sodium 142 mmol/L      Potassium 3.5 mmol/L      Chloride 106 mmol/L      CO2 27.0 mmol/L      Calcium 8.8 mg/dL      eGFR Non African Amer 78 mL/min/1.73      BUN/Creatinine Ratio 19.4     Anion Gap 9.0 mmol/L     Narrative:      GFR Normal >60  Chronic Kidney Disease <60  Kidney Failure <15      CBC & Differential [[id number]]  (Abnormal) Collected: 11/21/20 0539    Specimen: Blood Updated: 11/21/20 0544    Narrative:      The following orders were created for panel order CBC & " Differential.  Procedure                               Abnormality         Status                     ---------                               -----------         ------                     CBC Auto Differential[[id number]]        Abnormal            Final result                 Please view results for these tests on the individual orders.    CBC Auto Differential [[id number]]  (Abnormal) Collected: 11/21/20 0539    Specimen: Blood Updated: 11/21/20 0544     WBC 5.20 10*3/mm3      RBC 4.17 10*6/mm3      Hemoglobin 11.8 g/dL      Hematocrit 36.6 %      MCV 87.8 fL      MCH 28.3 pg      MCHC 32.2 g/dL      RDW 12.9 %      RDW-SD 41.4 fl      MPV 8.4 fL      Platelets 232 10*3/mm3      Neutrophil % 66.0 %      Lymphocyte % 19.0 %      Monocyte % 9.6 %      Eosinophil % 5.0 %      Basophil % 0.4 %      Immature Grans % 0.0 %      Neutrophils, Absolute 3.43 10*3/mm3      Lymphocytes, Absolute 0.99 10*3/mm3      Monocytes, Absolute 0.50 10*3/mm3      Eosinophils, Absolute 0.26 10*3/mm3      Basophils, Absolute 0.02 10*3/mm3      Immature Grans, Absolute 0.00 10*3/mm3           Imaging Results (Last 24 Hours)     ** No results found for the last 24 hours. **          Xray not reviewed personally by physician.      ECG not reviewed personally by physician  ECG/EMG Results (most recent)     Procedure Component Value Units Date/Time    ECG 12 Lead [[id number]] Collected: 11/14/20 1359     Updated: 11/14/20 1747     QT Interval 337 ms     Narrative:      HEART RATE= 99  bpm  RR Interval= 604  ms  VT Interval= 121  ms  P Horizontal Axis= -2  deg  P Front Axis= 59  deg  QRSD Interval= 87  ms  QT Interval= 337  ms  QRS Axis= -49  deg  T Wave Axis= 60  deg  - NORMAL ECG -  Sinus rhythm  When compared with ECG of 04-Dec-2018 12:21:54,  No significant change  Electronically Signed By: Burke Garcia ([facility]) 14-Nov-2020 17:45:03  Date and Time of Study: 2020-11-14 13:59:52    Adult Transthoracic Echo Complete W/ Cont if Necessary Per  Protocol [[id number]] Collected: 11/16/20 0745     Updated: 11/16/20 0853     BSA 1.5 m^2      IVSd 0.58 cm      LVIDd 4.1 cm      LVIDs 2.5 cm      LVPWd 0.6 cm      IVS/LVPW 0.97     FS 39.2 %      EDV(Teich) 72.5 ml      ESV(Teich) 21.7 ml      EF(Teich) 70.1 %      EDV(cubed) 66.9 ml      ESV(cubed) 15.1 ml      EF(cubed) 77.5 %      LV mass(C)d 65.0 grams      LV mass(C)dI 42.3 grams/m^2      SV(Teich) 50.9 ml      SI(Teich) 33.1 ml/m^2      SV(cubed) 51.9 ml      SI(cubed) 33.8 ml/m^2      Ao root diam 3.0 cm      Ao root area 7.1 cm^2      asc Aorta Diam 2.7 cm      LVOT diam 1.9 cm      LVOT area 2.8 cm^2      LVOT area(traced) 2.8 cm^2      LVLd ap4 6.6 cm      EDV(MOD-sp4) 73.6 ml      LVLs ap4 5.1 cm      ESV(MOD-sp4) 21.9 ml      EF(MOD-sp4) 70.2 %      LVLd ap2 6.3 cm      EDV(MOD-sp2) 54.1 ml      LVLs ap2 4.5 cm      ESV(MOD-sp2) 12.1 ml      EF(MOD-sp2) 77.6 %      SV(MOD-sp4) 51.7 ml      SI(MOD-sp4) 33.7 ml/m^2      SV(MOD-sp2) 42.0 ml      SI(MOD-sp2) 27.4 ml/m^2      Ao root area (BSA corrected) 2.0     LV Modi Vol (BSA corrected) 48.0 ml/m^2      LV Sys Vol (BSA corrected) 14.3 ml/m^2      TAPSE (>1.6) 2.0 cm      EF(MOD-bp) 73.1 %      MV E max royal 105.0 cm/sec      MV A max royal 117.0 cm/sec      MV E/A 0.9     MV V2 mean 84.9 cm/sec      MV mean PG 3.0 mmHg      MV V2 VTI 22.1 cm      MVA(VTI) 2.6 cm^2      MV P1/2t max royal 96.2 cm/sec      MV P1/2t 58.2 msec      MVA(P1/2t) 3.8 cm^2      MV dec slope 484.0 cm/sec^2      MV dec time 166 sec      Ao V2 mean 82.2 cm/sec      Ao mean PG 3.0 mmHg      Ao mean PG (full) 1.0 mmHg      Ao V2 VTI 21.5 cm      PRISCILLA(I,A) 2.7 cm^2      PRISCILLA(I,D) 2.7 cm^2      LV V1 mean PG 2.0 mmHg      LV V1 mean 63.8 cm/sec      LV V1 VTI 20.2 cm      SV(Ao) 152.0 ml      SI(Ao) 99.1 ml/m^2      SV(LVOT) 57.3 ml      SI(LVOT) 37.3 ml/m^2      TR max royal 227.5 cm/sec      MVA P1/2T LCG 2.3 cm^2      RV Base 3.1 cm      Lat Peak E' Royal 9.4 cm/sec      Med Peak E' Royal 8.7  cm/sec       CV ECHO GIO - BZI_BMI 18.3 kilograms/m^2       CV ECHO GIO - BSA(HAYCOCK) 1.5 m^2       CV ECHO GIO - BZI_METRIC_WEIGHT 49.9 kg       CV ECHO GIO - BZI_METRIC_HEIGHT 165.1 cm      Avg E/e' ratio 11.60     Target HR (85%) 134 bpm      Max. Pred. HR (100%) 158 bpm      LA Volume Index 17.0 mL/m2      Ao pk sharif 112.0 cm/sec      LV V1 max PG 4.0 mmHg      LV V1 max 96.0 cm/sec      RAP systole 3 mmHg      RVSP(TR) 24 mmHg      Ao max PG 5 mmHg     Narrative:      · Left ventricular ejection fraction appears to be 61 - 65%. Left   ventricular systolic function is normal.  · Left ventricular diastolic function was normal.  · No significant valvular stenosis or regurgitation noted.  · Estimated right ventricular systolic pressure from tricuspid   regurgitation is normal (<35 mmHg). Calculated right ventricular systolic   pressure from tricuspid regurgitation is 24 mmHg.       ECG 12 Lead [[id number]] Collected: 11/16/20 0924     Updated: 11/16/20 1952     QT Interval 366 ms     Narrative:      HEART RATE= 97  bpm  RR Interval= 616  ms  ID Interval= 116  ms  P Horizontal Axis= 26  deg  P Front Axis= 81  deg  QRSD Interval= 76  ms  QT Interval= 366  ms  QRS Axis= -41  deg  T Wave Axis= 53  deg  - OTHERWISE NORMAL ECG -  Sinus rhythm  Left axis deviation  Low voltage, extremity leads  NO SIGNIFICANT CHANGE FROM PREVIOUS ECG  Electronically Signed By: Luis A Patel ([facility]) 16-Nov-2020 19:45:47  Date and Time of Study: 2020-11-16 09:24:45          Medication Review:   I have reviewed the patient's current medication list    Current Facility-Administered Medications:   •  acetaminophen (TYLENOL) tablet 650 mg, 650 mg, Oral, Q6H PRN, Juan Reyes MD  •  ampicillin-sulbactam 3 g/100 mL 0.9% NS (MBP), 3 g, Intravenous, Q6H, Elvi Cummings MD, Last Rate: 100 mL/hr at 11/20/20 1738, 3 g at 11/21/20 0645  •  docusate sodium (COLACE) capsule 100 mg, 100 mg, Oral, BID, Juan Reyes MD, 100 mg at  11/20/20 2232  •  donepezil (ARICEPT) tablet 5 mg, 5 mg, Oral, Nightly, Juan Reyes MD, 5 mg at 11/20/20 2231  •  enoxaparin (LOVENOX) syringe 40 mg, 40 mg, Subcutaneous, Q24H, Juan Reyes MD, 40 mg at 11/19/20 1815  •  escitalopram (LEXAPRO) tablet 20 mg, 20 mg, Oral, Daily, Juan Reyes MD, 20 mg at 11/20/20 0849  •  losartan (COZAAR) tablet 50 mg, 50 mg, Oral, Daily, Juan Reyes MD, 50 mg at 11/20/20 0849  •  memantine (NAMENDA) tablet 5 mg, 5 mg, Oral, Q12H, Juan Reyes MD, 5 mg at 11/20/20 2231  •  multivitamin with minerals 1 tablet, 1 tablet, Oral, Daily, Juan Reyes MD, 1 tablet at 11/20/20 0849  •  pantoprazole (PROTONIX) EC tablet 40 mg, 40 mg, Oral, QAM, Juan Reyes MD, 40 mg at 11/20/20 0609  •  potassium phosphate 45 mmol in sodium chloride 0.9 % 500 mL infusion, 45 mmol, Intravenous, PRN **OR** potassium phosphate 30 mmol in sodium chloride 0.9 % 250 mL infusion, 30 mmol, Intravenous, PRN **OR** potassium phosphate 15 mmol in sodium chloride 0.9 % 100 mL infusion, 15 mmol, Intravenous, PRN, 15 mmol at 11/18/20 1159 **OR** sodium phosphates 45 mmol in sodium chloride 0.9 % 500 mL IVPB, 45 mmol, Intravenous, PRN **OR** sodium phosphates 30 mmol in sodium chloride 0.9 % 250 mL IVPB, 30 mmol, Intravenous, PRN **OR** sodium phosphates 15 mmol in sodium chloride 0.9 % 250 mL IVPB, 15 mmol, Intravenous, PRN, Elvi Cummings MD  •  pravastatin (PRAVACHOL) tablet 10 mg, 10 mg, Oral, Daily, Juan Reyes MD, 10 mg at 11/20/20 0849  •  QUEtiapine fumarate ER (SEROquel XR) tablet 150 mg, 150 mg, Oral, Nightly, Juan Reyes MD, 150 mg at 11/20/20 2232  •  sodium chloride 0.9 % flush 10 mL, 10 mL, Intravenous, PRN, uJan Reyes MD  •  sodium chloride 0.9 % flush 10 mL, 10 mL, Intravenous, Q12H, Juan Reyes MD, 10 mL at 11/20/20 2233  •  sodium chloride 0.9 % infusion 40 mL, 40 mL, Intravenous, PRN, Juan Reyes MD      Assessment/Plan     Lactic  Acidosis    ISH    Aspiration Pneumonia    Fever/ resolved    Essential Hypertension    Cognitive Defect    Type 2 NSTEMI    Pulmonary Nodule   Follow up cT chest in 6 months    Resident of [facility]   He is a wiley of the [facility] and discharge planners are working with [facility] and [facility] for placement.    Plan for disposition:  Placement    Gin Mckinney DO  11/21/20  08:22 EST      Time: 15 min

## 2020-11-21 NOTE — PLAN OF CARE
Problem: Adult Inpatient Plan of Care  Goal: Plan of Care Review  Outcome: Ongoing, Progressing  Flowsheets (Taken 11/21/2020 1008)  Plan of Care Reviewed With: (RN, Rain)   patient   other (see comments)  Outcome Summary: SLP: Reassessed swallowing at breakfast. Pt with significant oral holding of food and liquids and requires consistent prompts to clear. He demonstrates coughing on one trial of thins. Recommend to continue with puree diet at this time and nectar thick liquids. Total supervision and feed meals. Will continue to follow. Would benefit from MBS, but unsure if able to complete due to severe holding of food. Will reassess on Monday.

## 2020-11-22 LAB
ANION GAP SERPL CALCULATED.3IONS-SCNC: 11 MMOL/L (ref 5–15)
BASOPHILS # BLD AUTO: 0.03 10*3/MM3 (ref 0–0.2)
BASOPHILS NFR BLD AUTO: 0.6 % (ref 0–1.5)
BUN SERPL-MCNC: 19 MG/DL (ref 8–23)
BUN/CREAT SERPL: 20.9 (ref 7–25)
CALCIUM SPEC-SCNC: 9 MG/DL (ref 8.6–10.5)
CHLORIDE SERPL-SCNC: 106 MMOL/L (ref 98–107)
CO2 SERPL-SCNC: 27 MMOL/L (ref 22–29)
CREAT SERPL-MCNC: 0.91 MG/DL (ref 0.76–1.27)
DEPRECATED RDW RBC AUTO: 42.2 FL (ref 37–54)
EOSINOPHIL # BLD AUTO: 0.19 10*3/MM3 (ref 0–0.4)
EOSINOPHIL NFR BLD AUTO: 4.1 % (ref 0.3–6.2)
ERYTHROCYTE [DISTWIDTH] IN BLOOD BY AUTOMATED COUNT: 13.1 % (ref 12.3–15.4)
GFR SERPL CREATININE-BSD FRML MDRD: 84 ML/MIN/1.73
GLUCOSE SERPL-MCNC: 80 MG/DL (ref 65–99)
HCT VFR BLD AUTO: 38.1 % (ref 37.5–51)
HGB BLD-MCNC: 12.2 G/DL (ref 13–17.7)
IMM GRANULOCYTES # BLD AUTO: 0.01 10*3/MM3 (ref 0–0.05)
IMM GRANULOCYTES NFR BLD AUTO: 0.2 % (ref 0–0.5)
LYMPHOCYTES # BLD AUTO: 1.04 10*3/MM3 (ref 0.7–3.1)
LYMPHOCYTES NFR BLD AUTO: 22.3 % (ref 19.6–45.3)
MCH RBC QN AUTO: 28.6 PG (ref 26.6–33)
MCHC RBC AUTO-ENTMCNC: 32 G/DL (ref 31.5–35.7)
MCV RBC AUTO: 89.4 FL (ref 79–97)
MONOCYTES # BLD AUTO: 0.42 10*3/MM3 (ref 0.1–0.9)
MONOCYTES NFR BLD AUTO: 9 % (ref 5–12)
NEUTROPHILS NFR BLD AUTO: 2.98 10*3/MM3 (ref 1.7–7)
NEUTROPHILS NFR BLD AUTO: 63.8 % (ref 42.7–76)
NRBC BLD AUTO-RTO: 0 /100 WBC (ref 0–0.2)
PLATELET # BLD AUTO: 250 10*3/MM3 (ref 140–450)
PMV BLD AUTO: 8.4 FL (ref 6–12)
POTASSIUM SERPL-SCNC: 3.6 MMOL/L (ref 3.5–5.2)
PROCALCITONIN SERPL-MCNC: 0.04 NG/ML (ref 0–0.25)
RBC # BLD AUTO: 4.26 10*6/MM3 (ref 4.14–5.8)
SODIUM SERPL-SCNC: 144 MMOL/L (ref 136–145)
WBC # BLD AUTO: 4.67 10*3/MM3 (ref 3.4–10.8)

## 2020-11-22 PROCEDURE — 25010000002 ENOXAPARIN PER 10 MG: Performed by: INTERNAL MEDICINE

## 2020-11-22 PROCEDURE — 94799 UNLISTED PULMONARY SVC/PX: CPT

## 2020-11-22 PROCEDURE — 25010000003 AMPICILLIN-SULBACTAM PER 1.5 G: Performed by: INTERNAL MEDICINE

## 2020-11-22 PROCEDURE — 80048 BASIC METABOLIC PNL TOTAL CA: CPT | Performed by: INTERNAL MEDICINE

## 2020-11-22 PROCEDURE — 84145 PROCALCITONIN (PCT): CPT | Performed by: INTERNAL MEDICINE

## 2020-11-22 PROCEDURE — 85025 COMPLETE CBC W/AUTO DIFF WBC: CPT | Performed by: INTERNAL MEDICINE

## 2020-11-22 PROCEDURE — 99231 SBSQ HOSP IP/OBS SF/LOW 25: CPT | Performed by: HOSPITALIST

## 2020-11-22 RX ADMIN — ENOXAPARIN SODIUM 40 MG: 40 INJECTION SUBCUTANEOUS at 20:46

## 2020-11-22 RX ADMIN — DOCUSATE SODIUM 100 MG: 100 CAPSULE ORAL at 20:48

## 2020-11-22 RX ADMIN — ESCITALOPRAM OXALATE 20 MG: 10 TABLET, FILM COATED ORAL at 10:09

## 2020-11-22 RX ADMIN — AMPICILLIN SODIUM AND SULBACTAM SODIUM 3 G: 2; 1 INJECTION, POWDER, FOR SOLUTION INTRAMUSCULAR; INTRAVENOUS at 04:52

## 2020-11-22 RX ADMIN — DONEPEZIL HYDROCHLORIDE 5 MG: 5 TABLET ORAL at 20:48

## 2020-11-22 RX ADMIN — PRAVASTATIN SODIUM 10 MG: 20 TABLET ORAL at 10:07

## 2020-11-22 RX ADMIN — MEMANTINE HYDROCHLORIDE 5 MG: 5 TABLET ORAL at 10:08

## 2020-11-22 RX ADMIN — DOCUSATE SODIUM 100 MG: 100 CAPSULE ORAL at 10:08

## 2020-11-22 RX ADMIN — MULTIPLE VITAMINS W/ MINERALS TAB 1 TABLET: TAB at 10:08

## 2020-11-22 RX ADMIN — AMPICILLIN SODIUM AND SULBACTAM SODIUM 3 G: 2; 1 INJECTION, POWDER, FOR SOLUTION INTRAMUSCULAR; INTRAVENOUS at 11:01

## 2020-11-22 RX ADMIN — SODIUM CHLORIDE, PRESERVATIVE FREE 10 ML: 5 INJECTION INTRAVENOUS at 10:09

## 2020-11-22 RX ADMIN — QUETIAPINE FUMARATE 150 MG: 50 TABLET, EXTENDED RELEASE ORAL at 20:47

## 2020-11-22 RX ADMIN — AMPICILLIN SODIUM AND SULBACTAM SODIUM 3 G: 2; 1 INJECTION, POWDER, FOR SOLUTION INTRAMUSCULAR; INTRAVENOUS at 15:18

## 2020-11-22 RX ADMIN — SODIUM CHLORIDE, PRESERVATIVE FREE 10 ML: 5 INJECTION INTRAVENOUS at 20:48

## 2020-11-22 RX ADMIN — AMPICILLIN SODIUM AND SULBACTAM SODIUM 3 G: 2; 1 INJECTION, POWDER, FOR SOLUTION INTRAMUSCULAR; INTRAVENOUS at 22:11

## 2020-11-22 RX ADMIN — LOSARTAN POTASSIUM 50 MG: 50 TABLET, FILM COATED ORAL at 10:08

## 2020-11-22 RX ADMIN — PANTOPRAZOLE SODIUM 40 MG: 40 TABLET, DELAYED RELEASE ORAL at 10:07

## 2020-11-22 RX ADMIN — ACETAMINOPHEN 650 MG: 325 TABLET, FILM COATED ORAL at 15:18

## 2020-11-22 RX ADMIN — MEMANTINE HYDROCHLORIDE 5 MG: 5 TABLET ORAL at 20:47

## 2020-11-22 NOTE — PLAN OF CARE
Goal Outcome Evaluation:  Plan of Care Reviewed With: patient  Progress: no change  Outcome Summary: puree dieet with nectar thick liquids - total feed, meds crushed in pudding - trouble swallowing foods/liquid and needs prompting - IV ampicillan - rested well through night - needs placement

## 2020-11-22 NOTE — PLAN OF CARE
Goal Outcome Evaluation:  Plan of Care Reviewed With: patient  Progress: no change  Outcome Summary: iv antibiotics, no adverse reactions, total care, meds crushed in pudding,pureed diet with ntl, low grade temp this shift, po tylenol given.

## 2020-11-22 NOTE — NURSING NOTE
Continued Stay Note  JOANN Nelson     Patient Name: Arian Persaud  MRN: [id number]  Today's Date: 11/22/2020    Admit Date: 11/14/2020    Discharge Plan    Follow up visit, patient in bed with eyes open. Answers CM when asked Y/N questions. Informed lunch is on its way and he indictes he is not hungry. Encouraged to eat even a little to keep his strength up. Noted  a train sitting on bedside table, asked if he likes trains and he relies 'yes'. CM will continue to follow.       Discharge Codes    No documentation.             Bill Trujillo RN

## 2020-11-22 NOTE — PROGRESS NOTES
"Hospitalist Team      Patient Care Team:  Hyacinth Elizabeth MD as PCP - General (Internal Medicine)        Chief Complaint:  Aspiration pneumonitis    Subjective    Interval History and ROS:     Patient is on usasyn.  He is awaiting placement.  The discharge planners with contact the wiley of the state again tomorrow to try to find out what we can do.  He is Afebrile and stable.  Labs are good.  I discontinued daily lab.    Objective    Vital Signs  Temp:  [96.5 °F (35.8 °C)-101.1 °F (38.4 °C)] 96.5 °F (35.8 °C)  Heart Rate:  [76-98] 79  Resp:  [18] 18  BP: (139-165)/(71-93) 163/72  Oxygen Therapy  SpO2: 96 %  Pulse Oximetry Type: Intermittent  Device (Oxygen Therapy): room air}  Flowsheet Rows      First Filed Value   Admission Height  -- [165.1 cm (65\")] Documented at 11/16/2020 1229   Admission Weight  49.9 kg (110 lb) Documented at 11/14/2020 1745          Body mass index is 18.3 kg/m².      Physical Exam:    Physical Exam  Vitals signs and nursing note reviewed.   Constitutional:       Comments: Patient is not verbal with me  Severe cognitive defect  Here from [facility]   Cardiovascular:      Rate and Rhythm: Normal rate and regular rhythm.   Pulmonary:      Effort: Pulmonary effort is normal.      Breath sounds: Normal breath sounds.         Results Review:     I reviewed the patient's new clinical results.    Lab Results (last 24 hours)     Procedure Component Value Units Date/Time    Basic Metabolic Panel [[id number]]  (Normal) Collected: 11/22/20 0359    Specimen: Blood Updated: 11/22/20 0618     Glucose 80 mg/dL      BUN 19 mg/dL      Creatinine 0.91 mg/dL      Sodium 144 mmol/L      Potassium 3.6 mmol/L      Chloride 106 mmol/L      CO2 27.0 mmol/L      Calcium 9.0 mg/dL      eGFR Non African Amer 84 mL/min/1.73      BUN/Creatinine Ratio 20.9     Anion Gap 11.0 mmol/L     Narrative:      GFR Normal >60  Chronic Kidney Disease <60  Kidney Failure <15      Procalcitonin [[id number]]  (Normal) " Collected: 11/22/20 0359    Specimen: Blood Updated: 11/22/20 0448     Procalcitonin 0.04 ng/mL     Narrative:      Results may be falsely decreased if patient taking Biotin.     CBC & Differential [[id number]]  (Abnormal) Collected: 11/22/20 0359    Specimen: Blood Updated: 11/22/20 0414    Narrative:      The following orders were created for panel order CBC & Differential.  Procedure                               Abnormality         Status                     ---------                               -----------         ------                     CBC Auto Differential[[id number]]        Abnormal            Final result                 Please view results for these tests on the individual orders.    CBC Auto Differential [[id number]]  (Abnormal) Collected: 11/22/20 0359    Specimen: Blood Updated: 11/22/20 0414     WBC 4.67 10*3/mm3      RBC 4.26 10*6/mm3      Hemoglobin 12.2 g/dL      Hematocrit 38.1 %      MCV 89.4 fL      MCH 28.6 pg      MCHC 32.0 g/dL      RDW 13.1 %      RDW-SD 42.2 fl      MPV 8.4 fL      Platelets 250 10*3/mm3      Neutrophil % 63.8 %      Lymphocyte % 22.3 %      Monocyte % 9.0 %      Eosinophil % 4.1 %      Basophil % 0.6 %      Immature Grans % 0.2 %      Neutrophils, Absolute 2.98 10*3/mm3      Lymphocytes, Absolute 1.04 10*3/mm3      Monocytes, Absolute 0.42 10*3/mm3      Eosinophils, Absolute 0.19 10*3/mm3      Basophils, Absolute 0.03 10*3/mm3      Immature Grans, Absolute 0.01 10*3/mm3      nRBC 0.0 /100 WBC           Imaging Results (Last 24 Hours)     ** No results found for the last 24 hours. **          Xray not reviewed personally by physician.      ECG not reviewed personally by physician  ECG/EMG Results (most recent)     Procedure Component Value Units Date/Time    ECG 12 Lead [[id number]] Collected: 11/14/20 1359     Updated: 11/14/20 1747     QT Interval 337 ms     Narrative:      HEART RATE= 99  bpm  RR Interval= 604  ms  OR Interval= 121  ms  P Horizontal Axis= -2  deg  P  Front Axis= 59  deg  QRSD Interval= 87  ms  QT Interval= 337  ms  QRS Axis= -49  deg  T Wave Axis= 60  deg  - NORMAL ECG -  Sinus rhythm  When compared with ECG of 04-Dec-2018 12:21:54,  No significant change  Electronically Signed By: Burke Garcia ([facility]) 14-Nov-2020 17:45:03  Date and Time of Study: 2020-11-14 13:59:52    Adult Transthoracic Echo Complete W/ Cont if Necessary Per Protocol [[id number]] Collected: 11/16/20 0745     Updated: 11/16/20 0853     BSA 1.5 m^2      IVSd 0.58 cm      LVIDd 4.1 cm      LVIDs 2.5 cm      LVPWd 0.6 cm      IVS/LVPW 0.97     FS 39.2 %      EDV(Teich) 72.5 ml      ESV(Teich) 21.7 ml      EF(Teich) 70.1 %      EDV(cubed) 66.9 ml      ESV(cubed) 15.1 ml      EF(cubed) 77.5 %      LV mass(C)d 65.0 grams      LV mass(C)dI 42.3 grams/m^2      SV(Teich) 50.9 ml      SI(Teich) 33.1 ml/m^2      SV(cubed) 51.9 ml      SI(cubed) 33.8 ml/m^2      Ao root diam 3.0 cm      Ao root area 7.1 cm^2      asc Aorta Diam 2.7 cm      LVOT diam 1.9 cm      LVOT area 2.8 cm^2      LVOT area(traced) 2.8 cm^2      LVLd ap4 6.6 cm      EDV(MOD-sp4) 73.6 ml      LVLs ap4 5.1 cm      ESV(MOD-sp4) 21.9 ml      EF(MOD-sp4) 70.2 %      LVLd ap2 6.3 cm      EDV(MOD-sp2) 54.1 ml      LVLs ap2 4.5 cm      ESV(MOD-sp2) 12.1 ml      EF(MOD-sp2) 77.6 %      SV(MOD-sp4) 51.7 ml      SI(MOD-sp4) 33.7 ml/m^2      SV(MOD-sp2) 42.0 ml      SI(MOD-sp2) 27.4 ml/m^2      Ao root area (BSA corrected) 2.0     LV Modi Vol (BSA corrected) 48.0 ml/m^2      LV Sys Vol (BSA corrected) 14.3 ml/m^2      TAPSE (>1.6) 2.0 cm      EF(MOD-bp) 73.1 %      MV E max sharif 105.0 cm/sec      MV A max sharif 117.0 cm/sec      MV E/A 0.9     MV V2 mean 84.9 cm/sec      MV mean PG 3.0 mmHg      MV V2 VTI 22.1 cm      MVA(VTI) 2.6 cm^2      MV P1/2t max sharif 96.2 cm/sec      MV P1/2t 58.2 msec      MVA(P1/2t) 3.8 cm^2      MV dec slope 484.0 cm/sec^2      MV dec time 166 sec      Ao V2 mean 82.2 cm/sec      Ao mean PG 3.0 mmHg      Ao mean PG (full)  1.0 mmHg      Ao V2 VTI 21.5 cm      PRISCILLA(I,A) 2.7 cm^2      PRISCILLA(I,D) 2.7 cm^2      LV V1 mean PG 2.0 mmHg      LV V1 mean 63.8 cm/sec      LV V1 VTI 20.2 cm      SV(Ao) 152.0 ml      SI(Ao) 99.1 ml/m^2      SV(LVOT) 57.3 ml      SI(LVOT) 37.3 ml/m^2      TR max royal 227.5 cm/sec      MVA P1/2T LCG 2.3 cm^2      RV Base 3.1 cm      Lat Peak E' Royal 9.4 cm/sec      Med Peak E' Royal 8.7 cm/sec       CV ECHO GIO - BZI_BMI 18.3 kilograms/m^2       CV ECHO GIO - BSA(HAYCOCK) 1.5 m^2       CV ECHO GIO - BZI_METRIC_WEIGHT 49.9 kg       CV ECHO GIO - BZI_METRIC_HEIGHT 165.1 cm      Avg E/e' ratio 11.60     Target HR (85%) 134 bpm      Max. Pred. HR (100%) 158 bpm      LA Volume Index 17.0 mL/m2      Ao pk royal 112.0 cm/sec      LV V1 max PG 4.0 mmHg      LV V1 max 96.0 cm/sec      RAP systole 3 mmHg      RVSP(TR) 24 mmHg      Ao max PG 5 mmHg     Narrative:      · Left ventricular ejection fraction appears to be 61 - 65%. Left   ventricular systolic function is normal.  · Left ventricular diastolic function was normal.  · No significant valvular stenosis or regurgitation noted.  · Estimated right ventricular systolic pressure from tricuspid   regurgitation is normal (<35 mmHg). Calculated right ventricular systolic   pressure from tricuspid regurgitation is 24 mmHg.       ECG 12 Lead [[id number]] Collected: 11/16/20 0924     Updated: 11/16/20 1952     QT Interval 366 ms     Narrative:      HEART RATE= 97  bpm  RR Interval= 616  ms  TN Interval= 116  ms  P Horizontal Axis= 26  deg  P Front Axis= 81  deg  QRSD Interval= 76  ms  QT Interval= 366  ms  QRS Axis= -41  deg  T Wave Axis= 53  deg  - OTHERWISE NORMAL ECG -  Sinus rhythm  Left axis deviation  Low voltage, extremity leads  NO SIGNIFICANT CHANGE FROM PREVIOUS ECG  Electronically Signed By: Luis A Patel ([facility]) 16-Nov-2020 19:45:47  Date and Time of Study: 2020-11-16 09:24:45          Medication Review:   I have reviewed the patient's current medication  list    Current Facility-Administered Medications:   •  acetaminophen (TYLENOL) tablet 650 mg, 650 mg, Oral, Q6H PRN, Juan Reyes MD  •  ampicillin-sulbactam 3 g/100 mL 0.9% NS (MBP), 3 g, Intravenous, Q6H, Elvi Cummings MD, Last Rate: 0 mL/hr at 11/21/20 2330, 3 g at 11/22/20 0452  •  docusate sodium (COLACE) capsule 100 mg, 100 mg, Oral, BID, Juan Reyes MD, 100 mg at 11/21/20 2216  •  donepezil (ARICEPT) tablet 5 mg, 5 mg, Oral, Nightly, Juan Reyes MD, 5 mg at 11/21/20 2215  •  enoxaparin (LOVENOX) syringe 40 mg, 40 mg, Subcutaneous, Q24H, Juan Reyes MD, 40 mg at 11/21/20 1858  •  escitalopram (LEXAPRO) tablet 20 mg, 20 mg, Oral, Daily, Juan Reyes MD, 20 mg at 11/21/20 0914  •  losartan (COZAAR) tablet 50 mg, 50 mg, Oral, Daily, Juan Reyes MD, 50 mg at 11/21/20 0914  •  memantine (NAMENDA) tablet 5 mg, 5 mg, Oral, Q12H, Juan Reyes MD, 5 mg at 11/21/20 2215  •  multivitamin with minerals 1 tablet, 1 tablet, Oral, Daily, Juan Reyes MD, 1 tablet at 11/21/20 0914  •  pantoprazole (PROTONIX) EC tablet 40 mg, 40 mg, Oral, QAM, Juan Reyes MD, 40 mg at 11/21/20 0914  •  potassium phosphate 45 mmol in sodium chloride 0.9 % 500 mL infusion, 45 mmol, Intravenous, PRN **OR** potassium phosphate 30 mmol in sodium chloride 0.9 % 250 mL infusion, 30 mmol, Intravenous, PRN **OR** potassium phosphate 15 mmol in sodium chloride 0.9 % 100 mL infusion, 15 mmol, Intravenous, PRN, 15 mmol at 11/18/20 1159 **OR** sodium phosphates 45 mmol in sodium chloride 0.9 % 500 mL IVPB, 45 mmol, Intravenous, PRN **OR** sodium phosphates 30 mmol in sodium chloride 0.9 % 250 mL IVPB, 30 mmol, Intravenous, PRN **OR** sodium phosphates 15 mmol in sodium chloride 0.9 % 250 mL IVPB, 15 mmol, Intravenous, PRN, Elvi Cummings MD  •  pravastatin (PRAVACHOL) tablet 10 mg, 10 mg, Oral, Daily, Juan Reyes MD, 10 mg at 11/21/20 0914  •  QUEtiapine fumarate ER (SEROquel XR) tablet 150 mg,  150 mg, Oral, Nightly, Juan Reyes MD, 150 mg at 11/21/20 2214  •  sodium chloride 0.9 % flush 10 mL, 10 mL, Intravenous, PRN, Juan Reyes MD  •  sodium chloride 0.9 % flush 10 mL, 10 mL, Intravenous, Q12H, Juan Reyes MD, 10 mL at 11/21/20 2216  •  sodium chloride 0.9 % infusion 40 mL, 40 mL, Intravenous, PRN, Juan Reyes MD      Assessment/Plan        Lactic Acidosis     ISH     Aspiration Pneumonia     Fever/ resolved     Essential Hypertension     Cognitive Defect   Mental retardation with Dementia     Type 2 NSTEMI     Pulmonary Nodule              Follow up cT chest in 6 months    Moderate malnutrition     Resident of [facility]              [facility] and [facility] for placement.    Plan for disposition: Unknown at this time    Gin Mckinney DO  11/22/20  07:34 EST      Time: 15 min

## 2020-11-23 VITALS
DIASTOLIC BLOOD PRESSURE: 93 MMHG | RESPIRATION RATE: 16 BRPM | HEART RATE: 97 BPM | TEMPERATURE: 98.3 F | OXYGEN SATURATION: 96 % | SYSTOLIC BLOOD PRESSURE: 172 MMHG | WEIGHT: 110 LBS | BODY MASS INDEX: 18.3 KG/M2

## 2020-11-23 PROCEDURE — 25010000003 AMPICILLIN-SULBACTAM PER 1.5 G: Performed by: INTERNAL MEDICINE

## 2020-11-23 PROCEDURE — 94799 UNLISTED PULMONARY SVC/PX: CPT

## 2020-11-23 PROCEDURE — 99238 HOSP IP/OBS DSCHRG MGMT 30/<: CPT | Performed by: HOSPITALIST

## 2020-11-23 RX ORDER — AMOXICILLIN AND CLAVULANATE POTASSIUM 250; 62.5 MG/5ML; MG/5ML
500 POWDER, FOR SUSPENSION ORAL 2 TIMES DAILY
Qty: 60 ML | Refills: 0 | Status: SHIPPED | OUTPATIENT
Start: 2020-11-23 | End: 2020-12-06 | Stop reason: HOSPADM

## 2020-11-23 RX ORDER — QUETIAPINE 150 MG/1
150 TABLET, FILM COATED, EXTENDED RELEASE ORAL NIGHTLY
Qty: 90 EACH | Refills: 0 | Status: SHIPPED | OUTPATIENT
Start: 2020-11-23 | End: 2020-12-06 | Stop reason: HOSPADM

## 2020-11-23 RX ORDER — DONEPEZIL HYDROCHLORIDE 5 MG/1
5 TABLET, FILM COATED ORAL NIGHTLY
Qty: 30 TABLET | Refills: 0 | Status: SHIPPED | OUTPATIENT
Start: 2020-11-23 | End: 2020-12-23

## 2020-11-23 RX ORDER — PSEUDOEPHEDRINE HCL 30 MG
100 TABLET ORAL 2 TIMES DAILY
Start: 2020-11-23

## 2020-11-23 RX ORDER — PANTOPRAZOLE SODIUM 40 MG/1
40 TABLET, DELAYED RELEASE ORAL EVERY MORNING
Qty: 30 TABLET | Refills: 0 | Status: SHIPPED | OUTPATIENT
Start: 2020-11-23 | End: 2020-12-06 | Stop reason: HOSPADM

## 2020-11-23 RX ADMIN — LOSARTAN POTASSIUM 50 MG: 50 TABLET, FILM COATED ORAL at 10:47

## 2020-11-23 RX ADMIN — AMPICILLIN SODIUM AND SULBACTAM SODIUM 3 G: 2; 1 INJECTION, POWDER, FOR SOLUTION INTRAMUSCULAR; INTRAVENOUS at 04:19

## 2020-11-23 RX ADMIN — MULTIPLE VITAMINS W/ MINERALS TAB 1 TABLET: TAB at 10:47

## 2020-11-23 RX ADMIN — ESCITALOPRAM OXALATE 20 MG: 10 TABLET, FILM COATED ORAL at 10:48

## 2020-11-23 RX ADMIN — PANTOPRAZOLE SODIUM 40 MG: 40 TABLET, DELAYED RELEASE ORAL at 05:48

## 2020-11-23 RX ADMIN — MEMANTINE HYDROCHLORIDE 5 MG: 5 TABLET ORAL at 10:47

## 2020-11-23 RX ADMIN — DOCUSATE SODIUM 100 MG: 100 CAPSULE ORAL at 10:47

## 2020-11-23 RX ADMIN — PRAVASTATIN SODIUM 10 MG: 20 TABLET ORAL at 10:48

## 2020-11-23 NOTE — NURSING NOTE
Continued Stay Note  JOANN VerdugoHidalgo     Patient Name: Arian Persaud  MRN: [id number]  Today's Date: 11/23/2020    Admit Date: 11/14/2020    Discharge Plan     Row Name 11/23/20 1054       Plan    Plan  to be determined    Plan Comments  LVM this morning for Nola Hanley regarding referral. LVM for Monico/Skylar regarding referral. Rec'd call from Rosette Pickett/ @ [facility]. Requested update regarding placement. Informed that 6 facilities were contacted, 2 of which are reviewing for possible placement. She states if neither facility accepts patient, [facility] will pick patient up at [place] and return him to their group home. They will continue to seek appropriate placement for him at that time. CM will notify Rosette of progress. Follow up call to Nola Hanley who states case is being reviewed by their DON and , she will return call with determination. CM will continue to follow.        Discharge Codes    No documentation.             Bill Trujillo RN

## 2020-11-23 NOTE — DISCHARGE SUMMARY
Airan Persaud  1955  [id number]    Hospitalists Discharge Summary    Date of Admission: 11/14/2020  Date of Discharge:  11/23/2020    History of Present Illness and Hospital Course Summary:     This is a 64-year-old male who had presented to the emergency room.  His caregiver gives a 2 to 3-day history of cough and lack of appetite.  He has not been as active as usual and refused his coffee and medications and this is unusual for this man.He was admitted to the hospital with lactic acidosis which was resolved elevated troponins a pulmonary nodule and dementia there was no signs of pneumonia or urinary tract infection.  Was in ISH which resolved.  He has a pulmonary nodule and this nodule needs follow-up in 6 months.  He was seen by speech and was diagnosed with moderate oral pharyngeal dysphagia and mild pharyngeal dysphagia the diet recommendations were puréed food and thin liquids.  He gradually improved back to baseline and we have been trying to work for placement.  He is a wiley of the [facility].  [facility] agreed to take the patient back to their facility and they will continue to work on getting the patient placed where he can get more care.        Primary Discharge Diagnoses and Secondary Discharge Diagnoses:        Lactic Acidosis/ resolved     ISH/ resolved     Aspiration Pneumonia/ diet ordered     Fever/ resolved     Essential Hypertension/ controlled     Cognitive Defect              Mental retardation with Dementia     Type 2 NSTEMI     Pulmonary Nodule              Follow up cT chest in 6 months     Moderate malnutrition/ follow when discharged and offer supplements     Resident of [facility]              He is a wiley of the [facility] and discharge planners are working with state and [facility] for placement.          PCP  Patient Care Team:  Hyacinth Elizabeth MD as PCP - General (Internal Medicine)    Consults:   Consults     Date and Time Order Name Status Description    11/15/2020 1147  Inpatient Cardiology Consult Completed           Operations and Procedures Performed:       Ct Angiogram Chest    Result Date: 11/14/2020  Narrative: PROCEDURE: CTA Chest COMPARISON: No relevant comparison or correlation studies available at time of dictation. INDICATIONS: PE suspected, low/intermediate prob, positive D-dimer;Acidosis;Acute kidney failure, unspecified  ;Fever, lethargy, anorexia x's about 2 days per care giver.  Elevated D-dimer.; Pt  Is mentally handicapped from [employer].;No chest surgeries. TECHNIQUE:   CTA of the chest is performed with IV contrast. Multiplanar MIP and 3-D reconstructions  images are performed on separate workstation under concurrent radiologist supervision per protocol and reviewed. Radiation dose reduction techniques included automated exposure control or exposure modulation based on body size. Count of known CT and cardiac nuc med studies performed in previous 12 months: 1.  FINDINGS: Examination hindered due to patient's contracted state and mild kyphosis and arms being within the gantry. There is also some respiratory motion hindering assessment for small pulmonary nodules. Study optimized for evaluation of the pulmonary arteries for pulmonary embolus.    No filling defects seen within the pulmonary arterial tree to indicate acute pulmonary embolus. Lungs are grossly clear, no focal consolidation seen.  A couple of small peripheral subpleural pulmonary nodule suggested, one of the largest in the left lower lobe estimated at approximately 4 mm average diameter. No focal infiltrate seen. Debris noted within the trachea. Esophageal wall prominence is nonspecific. Marked atrophy of the left kidney with large upper pole renal calculus measuring 10 mm seen with cystic changes and decreased enhancement compared to the right kidney.   No gross acute bony abnormality seen.      Impression: 1. Study limited due to artifact but no evidence of PE. 2. Questionable small pulmonary  nodules in this 64-year-old gentleman. Unfortunately, there is respiratory motion artifact. Recommend 6 month follow-up chest CT. 3. Marked atrophy of the left kidney with cystic changes and large upper pole calculus.  Signer Name: Marycarmen Rivera MD  Signed: 11/14/2020 4:19 PM  Workstation Name: [facility]  [facility]      Allergies:  is allergic to keflex [cephalexin].    Sam  not reviewed    Discharge Medications:     Discharge Medications      New Medications      Instructions Start Date   amoxicillin-clavulanate 250-62.5 MG/5ML suspension  Commonly known as: Augmentin   500 mg, Oral, 2 Times Daily      pantoprazole 40 MG EC tablet  Commonly known as: PROTONIX   40 mg, Oral, Every Morning         Changes to Medications      Instructions Start Date   docusate sodium 100 MG capsule  What changed: when to take this   100 mg, Oral, 2 Times Daily      donepezil 5 MG tablet  Commonly known as: ARICEPT  What changed: when to take this   5 mg, Oral, Nightly      QUEtiapine  MG 24 hr tablet  Commonly known as: SEROquel XR  What changed: how much to take   150 mg, Oral, Nightly         Continue These Medications      Instructions Start Date   acetaminophen 325 MG tablet  Commonly known as: TYLENOL   650 mg, Oral, Every 6 Hours PRN      escitalopram 20 MG tablet  Commonly known as: LEXAPRO   20 mg, Oral, Daily      losartan 50 MG tablet  Commonly known as: COZAAR   50 mg, Oral, Daily      memantine 5 MG tablet  Commonly known as: NAMENDA   5 mg, Oral, 2 Times Daily      multivitamin with minerals tablet tablet   1 tablet, Oral, Daily      pravastatin 10 MG tablet  Commonly known as: PRAVACHOL   10 mg, Oral, Daily         Stop These Medications    baclofen 10 MG tablet  Commonly known as: LIORESAL     calcium carbonate 600 MG tablet  Commonly known as: OS-JOSHUA     ibuprofen 800 MG tablet  Commonly known as: ADVIL,MOTRIN     ketoconazole 2 % cream  Commonly known as: NIZORAL     loratadine  10 MG tablet  Commonly known as: CLARITIN     mineral oil 55 % oil oral liquid     montelukast 10 MG tablet  Commonly known as: SINGULAIR     nitrofurantoin 100 MG capsule  Commonly known as: MACRODANTIN     omeprazole 40 MG capsule  Commonly known as: priLOSEC     polyethylene glycol packet  Commonly known as: MIRALAX     tamsulosin 0.4 MG capsule 24 hr capsule  Commonly known as: FLOMAX     trimethoprim 100 MG tablet  Commonly known as: TRIMPEX            Last Lab Results:   Lab Results (most recent)     Procedure Component Value Units Date/Time    Basic Metabolic Panel [[id number]]  (Normal) Collected: 11/22/20 0359    Specimen: Blood Updated: 11/22/20 0618     Glucose 80 mg/dL      BUN 19 mg/dL      Creatinine 0.91 mg/dL      Sodium 144 mmol/L      Potassium 3.6 mmol/L      Chloride 106 mmol/L      CO2 27.0 mmol/L      Calcium 9.0 mg/dL      eGFR Non African Amer 84 mL/min/1.73      BUN/Creatinine Ratio 20.9     Anion Gap 11.0 mmol/L     Narrative:      GFR Normal >60  Chronic Kidney Disease <60  Kidney Failure <15      Procalcitonin [[id number]]  (Normal) Collected: 11/22/20 0359    Specimen: Blood Updated: 11/22/20 0448     Procalcitonin 0.04 ng/mL     Narrative:      Results may be falsely decreased if patient taking Biotin.     CBC & Differential [[id number]]  (Abnormal) Collected: 11/22/20 0359    Specimen: Blood Updated: 11/22/20 0414    Narrative:      The following orders were created for panel order CBC & Differential.  Procedure                               Abnormality         Status                     ---------                               -----------         ------                     CBC Auto Differential[[id number]]        Abnormal            Final result                 Please view results for these tests on the individual orders.    CBC Auto Differential [[id number]]  (Abnormal) Collected: 11/22/20 0359    Specimen: Blood Updated: 11/22/20 0414     WBC 4.67 10*3/mm3      RBC 4.26 10*6/mm3       Hemoglobin 12.2 g/dL      Hematocrit 38.1 %      MCV 89.4 fL      MCH 28.6 pg      MCHC 32.0 g/dL      RDW 13.1 %      RDW-SD 42.2 fl      MPV 8.4 fL      Platelets 250 10*3/mm3      Neutrophil % 63.8 %      Lymphocyte % 22.3 %      Monocyte % 9.0 %      Eosinophil % 4.1 %      Basophil % 0.6 %      Immature Grans % 0.2 %      Neutrophils, Absolute 2.98 10*3/mm3      Lymphocytes, Absolute 1.04 10*3/mm3      Monocytes, Absolute 0.42 10*3/mm3      Eosinophils, Absolute 0.19 10*3/mm3      Basophils, Absolute 0.03 10*3/mm3      Immature Grans, Absolute 0.01 10*3/mm3      nRBC 0.0 /100 WBC     Basic Metabolic Panel [[id number]]  (Normal) Collected: 11/21/20 0539    Specimen: Blood Updated: 11/21/20 0559     Glucose 75 mg/dL      BUN 19 mg/dL      Creatinine 0.98 mg/dL      Sodium 142 mmol/L      Potassium 3.5 mmol/L      Chloride 106 mmol/L      CO2 27.0 mmol/L      Calcium 8.8 mg/dL      eGFR Non African Amer 78 mL/min/1.73      BUN/Creatinine Ratio 19.4     Anion Gap 9.0 mmol/L     Narrative:      GFR Normal >60  Chronic Kidney Disease <60  Kidney Failure <15      CBC & Differential [[id number]]  (Abnormal) Collected: 11/21/20 0539    Specimen: Blood Updated: 11/21/20 0544    Narrative:      The following orders were created for panel order CBC & Differential.  Procedure                               Abnormality         Status                     ---------                               -----------         ------                     CBC Auto Differential[[id number]]        Abnormal            Final result                 Please view results for these tests on the individual orders.    CBC Auto Differential [[id number]]  (Abnormal) Collected: 11/21/20 0539    Specimen: Blood Updated: 11/21/20 0544     WBC 5.20 10*3/mm3      RBC 4.17 10*6/mm3      Hemoglobin 11.8 g/dL      Hematocrit 36.6 %      MCV 87.8 fL      MCH 28.3 pg      MCHC 32.2 g/dL      RDW 12.9 %      RDW-SD 41.4 fl      MPV 8.4 fL      Platelets 232 10*3/mm3       Neutrophil % 66.0 %      Lymphocyte % 19.0 %      Monocyte % 9.6 %      Eosinophil % 5.0 %      Basophil % 0.4 %      Immature Grans % 0.0 %      Neutrophils, Absolute 3.43 10*3/mm3      Lymphocytes, Absolute 0.99 10*3/mm3      Monocytes, Absolute 0.50 10*3/mm3      Eosinophils, Absolute 0.26 10*3/mm3      Basophils, Absolute 0.02 10*3/mm3      Immature Grans, Absolute 0.00 10*3/mm3     Procalcitonin [[id number]]  (Normal) Collected: 11/19/20 0650    Specimen: Blood Updated: 11/19/20 0827     Procalcitonin 0.06 ng/mL     Narrative:      Results may be falsely decreased if patient taking Biotin.     Magnesium [[id number]]  (Normal) Collected: 11/19/20 0650    Specimen: Blood Updated: 11/19/20 0728     Magnesium 1.8 mg/dL     Phosphorus [[id number]]  (Normal) Collected: 11/19/20 0650    Specimen: Blood Updated: 11/19/20 0728     Phosphorus 2.7 mg/dL     Phosphorus [[id number]]  (Abnormal) Collected: 11/18/20 0555    Specimen: Blood Updated: 11/18/20 0629     Phosphorus 2.3 mg/dL     Magnesium [[id number]]  (Normal) Collected: 11/18/20 0555    Specimen: Blood Updated: 11/18/20 0629     Magnesium 1.7 mg/dL     Hemoglobin A1c [[id number]]  (Normal) Collected: 11/17/20 0443    Specimen: Blood Updated: 11/17/20 1208     Hemoglobin A1C 5.22 %     Narrative:      Hemoglobin A1C Ranges:    Increased Risk for Diabetes  5.7% to 6.4%  Diabetes                     >= 6.5%  Diabetic Goal                < 7.0%    TSH [[id number]]  (Abnormal) Collected: 11/17/20 0443    Specimen: Blood Updated: 11/17/20 0927     TSH 4.470 uIU/mL     T4, Free [[id number]]  (Normal) Collected: 11/17/20 0443    Specimen: Blood Updated: 11/17/20 0927     Free T4 1.07 ng/dL     Narrative:      Results may be falsely increased if patient taking Biotin.      POC Glucose Once [[id number]]  (Abnormal) Collected: 11/17/20 0720    Specimen: Blood Updated: 11/17/20 0727     Glucose 60 mg/dL     Troponin [[id number]]  (Normal) Collected: 11/17/20 0443    Specimen: Blood  Updated: 11/17/20 0608     Troponin T 0.019 ng/mL     Narrative:      Troponin T Reference Range:  <= 0.03 ng/mL-   Negative for AMI  >0.03 ng/mL-     Abnormal for myocardial necrosis.  Clinicians would have to utilize clinical acumen, EKG, Troponin and serial changes to determine if it is an Acute Myocardial Infarction or myocardial injury due to an underlying chronic condition.       Results may be falsely decreased if patient taking Biotin.      Comprehensive Metabolic Panel [[id number]]  (Abnormal) Collected: 11/17/20 0443    Specimen: Blood Updated: 11/17/20 0606     Glucose 58 mg/dL      BUN 18 mg/dL      Creatinine 0.87 mg/dL      Sodium 142 mmol/L      Potassium 3.9 mmol/L      Chloride 111 mmol/L      CO2 19.0 mmol/L      Calcium 8.6 mg/dL      Total Protein 5.4 g/dL      Albumin 2.90 g/dL      ALT (SGPT) 6 U/L      AST (SGOT) 18 U/L      Alkaline Phosphatase 61 U/L      Total Bilirubin 0.4 mg/dL      eGFR Non African Amer 89 mL/min/1.73      Globulin 2.5 gm/dL      A/G Ratio 1.2 g/dL      BUN/Creatinine Ratio 20.7     Anion Gap 12.0 mmol/L     Narrative:      GFR Normal >60  Chronic Kidney Disease <60  Kidney Failure <15      Comprehensive Metabolic Panel [[id number]] Collected: 11/15/20 0333    Specimen: Blood Updated: 11/15/20 0530     Glucose 84 mg/dL      BUN 21 mg/dL      Creatinine 1.08 mg/dL      Sodium 141 mmol/L      Potassium 4.3 mmol/L      Chloride 106 mmol/L      CO2 23.0 mmol/L      Calcium 9.4 mg/dL      Total Protein 6.1 g/dL      Albumin 3.80 g/dL      ALT (SGPT) 7 U/L      AST (SGOT) 16 U/L      Alkaline Phosphatase 73 U/L      Total Bilirubin 0.4 mg/dL      eGFR Non African Amer 69 mL/min/1.73      Globulin 2.3 gm/dL      A/G Ratio 1.7 g/dL      BUN/Creatinine Ratio 19.4     Anion Gap 12.0 mmol/L     Narrative:      GFR Normal >60  Chronic Kidney Disease <60  Kidney Failure <15      Lipid Panel [[id number]]  (Abnormal) Collected: 11/15/20 0333    Specimen: Blood Updated: 11/15/20 0530      Total Cholesterol 110 mg/dL      Triglycerides 63 mg/dL      HDL Cholesterol 38 mg/dL      LDL Cholesterol  58 mg/dL      VLDL Cholesterol 14 mg/dL      LDL/HDL Ratio 1.56    Narrative:      Cholesterol Reference Ranges  (U.S. Department of Health and Human Services ATP III Classifications)    Desirable          <200 mg/dL  Borderline High    200-239 mg/dL  High Risk          >240 mg/dL      Triglyceride Reference Ranges  (U.S. Department of Health and Human Services ATP III Classifications)    Normal           <150 mg/dL  Borderline High  150-199 mg/dL  High             200-499 mg/dL  Very High        >500 mg/dL    HDL Reference Ranges  (U.S. Department of Health and Human Services ATP III Classifcations)    Low     <40 mg/dl (major risk factor for CHD)  High    >60 mg/dl ('negative' risk factor for CHD)        LDL Reference Ranges  (U.S. Department of Health and Human Services ATP III Classifcations)    Optimal          <100 mg/dL  Near Optimal     100-129 mg/dL  Borderline High  130-159 mg/dL  High             160-189 mg/dL  Very High        >189 mg/dL    Lactic Acid, Plasma [[id number]]  (Normal) Collected: 11/15/20 0333    Specimen: Blood Updated: 11/15/20 0407     Lactate 0.5 mmol/L     Troponin [[id number]]  (Abnormal) Collected: 11/14/20 1823    Specimen: Blood Updated: 11/14/20 1853     Troponin T 0.059 ng/mL     Narrative:      Troponin T Reference Range:  <= 0.03 ng/mL-   Negative for AMI  >0.03 ng/mL-     Abnormal for myocardial necrosis.  Clinicians would have to utilize clinical acumen, EKG, Troponin and serial changes to determine if it is an Acute Myocardial Infarction or myocardial injury due to an underlying chronic condition.       Results may be falsely decreased if patient taking Biotin.      Lactic Acid, Reflex [[id number]]  (Normal) Collected: 11/14/20 1828    Specimen: Blood Updated: 11/14/20 1846     Lactate 0.9 mmol/L     Lactic Acid, Reflex Timer (This will reflex a repeat order 3-3:15 hours  after ordered.) [[id number]] Collected: 11/14/20 1412    Specimen: Blood Updated: 11/14/20 1815     Hold Tube Hold for add-ons.     Comment: Auto resulted.       aPTT [[id number]]  (Normal) Collected: 11/14/20 1412    Specimen: Blood Updated: 11/14/20 1532     PTT 25.1 seconds     Narrative:      PTT = The equivalent PTT values for the therapeutic range of heparin levels at 0.1 to 0.7 U/ml are 53 to 110 seconds.      Protime-INR [[id number]]  (Abnormal) Collected: 11/14/20 1412    Specimen: Blood Updated: 11/14/20 1532     Protime 14.1 Seconds      INR 1.12    Narrative:      Therapeutic Ranges for INR: 2.0-3.0 (PT 20-30)                              2.5-3.5 (PT 25-34)    Urinalysis With Culture If Indicated - Urine, Clean Catch [[id number]]  (Normal) Collected: 11/14/20 1519    Specimen: Urine, Clean Catch Updated: 11/14/20 1528     Color, UA Yellow     Appearance, UA Clear     pH, UA 7.0     Specific Gravity, UA 1.025     Glucose, UA Negative     Ketones, UA Negative     Bilirubin, UA Negative     Blood, UA Negative     Protein, UA Negative     Leuk Esterase, UA Negative     Nitrite, UA Negative     Urobilinogen, UA 1.0 E.U./dL    Narrative:      Urine microscopic not indicated.    Lactate Dehydrogenase [[id number]]  (Abnormal) Collected: 11/14/20 1412    Specimen: Blood Updated: 11/14/20 1510      U/L      Comment: Specimen hemolyzed.  Results may be affected.       COVID-19,[facility]-HOUSE,Nasal Swab No Transport Media 3-4 HR TAT - Swab, Nasal Cavity [[id number]]  (Normal) Collected: 11/14/20 1416    Specimen: Swab from Nasal Cavity Updated: 11/14/20 1509     COVID19 Not Detected    Narrative:      Fact sheet for providers: https://www.fda.gov/media/369468/download     Fact sheet for patients: https://www.fda.gov/media/849835/download    Ferritin [[id number]]  (Normal) Collected: 11/14/20 1412    Specimen: Blood Updated: 11/14/20 1507     Ferritin 103.00 ng/mL     Narrative:      Results may be falsely  decreased if patient taking Biotin.      Lactic Acid, Plasma [[id number]]  (Abnormal) Collected: 11/14/20 1412    Specimen: Blood Updated: 11/14/20 1505     Lactate 10.8 mmol/L     D-dimer, Quantitative [[id number]]  (Abnormal) Collected: 11/14/20 1412    Specimen: Blood Updated: 11/14/20 1455     D-Dimer, Quantitative 2.52 MCGFEU/mL     Narrative:      Can be elevated in, but is not diagnostic for deep vein thrombosis (DVT) or pulmonary embolis (PE).  It is also elevated in other medical conditions.  Clinical correlation is required.  The negative cut-off value for the D-Dimer is 0.50 mcg FEU/mL for DVT and PE.          Imaging Results (Most Recent)     Procedure Component Value Units Date/Time    CT Angiogram Chest [[id number]] Collected: 11/14/20 1619     Updated: 11/14/20 1622    Narrative:      PROCEDURE: CTA Chest    COMPARISON: No relevant comparison or correlation studies available at time of dictation.     INDICATIONS: PE suspected, low/intermediate prob, positive D-dimer;Acidosis;Acute kidney failure, unspecified  ;Fever, lethargy, anorexia x's about 2 days per care giver.  Elevated D-dimer.; Pt  Is mentally handicapped from [employer].;No chest  surgeries.    TECHNIQUE:     CTA of the chest is performed with IV contrast. Multiplanar MIP and 3-D reconstructions  images are performed on separate workstation under concurrent radiologist supervision per protocol and reviewed.  Radiation dose reduction techniques included automated exposure control or exposure modulation based on body size. Count of known CT and cardiac nuc med studies performed in previous 12 months: 1.          FINDINGS: Examination hindered due to patient's contracted state and mild kyphosis and arms being within the gantry. There is also some respiratory motion hindering assessment for small pulmonary nodules.   Study optimized for evaluation of the pulmonary arteries for pulmonary embolus.    No filling defects seen within the pulmonary  arterial tree to indicate acute pulmonary embolus.  Lungs are grossly clear, no focal consolidation seen.  A couple of small peripheral subpleural pulmonary nodule suggested, one of the largest in the left lower lobe estimated at approximately 4 mm average diameter. No focal infiltrate seen. Debris noted  within the trachea. Esophageal wall prominence is nonspecific. Marked atrophy of the left kidney with large upper pole renal calculus measuring 10 mm seen with cystic changes and decreased enhancement compared to the right kidney.    No gross acute bony abnormality seen.         Impression:      1. Study limited due to artifact but no evidence of PE.      2. Questionable small pulmonary nodules in this 64-year-old gentleman. Unfortunately, there is respiratory motion artifact. Recommend 6 month follow-up chest CT.  3. Marked atrophy of the left kidney with cystic changes and large upper pole calculus.       Signer Name: Marycarmen Rivera MD   Signed: 11/14/2020 4:19 PM   Workstation Name: [id number]    [facility]          PROCEDURES      Condition on Discharge:  Stable    Physical Exam at Discharge  Vital Signs  Temp:  [98.3 °F (36.8 °C)-99.8 °F (37.7 °C)] 98.3 °F (36.8 °C)  Heart Rate:  [86-99] 97  Resp:  [16-18] 16  BP: (113-172)/(67-98) 172/93    Body mass index is 18.3 kg/m².      Physical Exam  Vitals signs and nursing note reviewed.   Cardiovascular:      Rate and Rhythm: Normal rate and regular rhythm.   Pulmonary:      Effort: Pulmonary effort is normal.      Breath sounds: Normal breath sounds.   Neurological:      Comments: Patient is cognirively severely declined.           Discharge Disposition  [facility]    Visiting Nurse:    No     Home PT/OT:  No     Home Safety Evaluation:  No     DME  [facility]    Discharge Diet:      Dietary Orders (From admission, onward)     Start     Ordered    11/21/20 0800  Dietary Nutrition Supplement: Boost Plus (Ensure Enlive, Ensure Plus)   Daily With Breakfast, Lunch & Dinner     Question:  Select Supplement:  Answer:  Boost Plus (Ensure Enlive, Ensure Plus)    11/21/20 0309    11/18/20 1018  Diet Pureed; Angie / Syrup Thick  Diet Effective Now     Question Answer Comment   Diet Texture / Consistency Pureed    Fluid Consistency Angie / Syrup Thick        11/18/20 1017    11/17/20 2200  Dietary Nutrition Supplement: Boost Plus (Ensure Enlive, Ensure Plus); chocolate  3 Times Daily     Comments: Prefers ashley or vanilla   Question Answer Comment   Select Supplement: Boost Plus (Ensure Enlive, Ensure Plus)    Flavor: chocolate        11/17/20 1837                Activity at Discharge:  As tolerated    Pre-discharge education  Medication review///  Appointment review///      Follow-up Appointments  No future appointments.  Additional Instructions for the Follow-ups that You Need to Schedule     Discharge Follow-up with PCP   As directed       Currently Documented PCP:    Hyacinth Elizabeth MD    PCP Phone Number:    [phone]     Follow Up Details: PCP in 2 weeks               Test Results Pending at Discharge       Gin Mckinney DO  11/23/20  15:16 EST    Time: 30 min (if over 30 minutes give explanation as to why it took greater than 30 minutes)

## 2020-11-23 NOTE — NURSING NOTE
Continued Stay Note  JOANN Nelson     Patient Name: Arian Persaud  MRN: [id number]  Today's Date: 11/23/2020    Admit Date: 11/14/2020    Discharge Plan     Row Name 11/23/20 6064       Plan    Plan  to be determined    Plan Comments  Per Nola Hanley - still reviewing case. Spoke to Monico/Skylar she states [facility] was not accepting patients last week and she is waiting for approval to resume admissions. She states she should have an answer by 3pm today.  CM will continue to follow.    Row Name 11/23/20 2434       Plan    Plan  to be determined    Plan Comments  LVM this morning for Nola Hanley regarding referral. LVM for Monico/Skylar regarding referral. Rec'd call from Rosette Pickett/ @ [facility]. Requested update regarding placement. Informed that 6 facilities were contacted, 2 of which are reviewing for possible placement. She states if neither facility accepts patient, [facility] will pick patient up at [place] and return him to their group home. They will continue to seek appropriate placement for him at that time. CM will notify Rosette of progress. Follow up call to Nola Hanley who states case is being reviewed by their DON and , she will return call with determination. CM will continue to follow.        Discharge Codes    No documentation.             Bill Trujillo RN

## 2020-11-23 NOTE — NURSING NOTE
Continued Stay Note   [place]     Patient Name: Arian Persaud  MRN: [id number]  Today's Date: 11/23/2020    Admit Date: 11/14/2020    Discharge Plan     Row Name 11/23/20 1538       Plan    Plan  plan dc to [facility]    Plan Comments  Rec'd call from Monico/Skylar who states they can accept patient today. Call to Beny Simon/urban who states he is agreeable to patient dc to [facility] and will sign all needed documents for admission. Call to Dulce to update that patient has been accepted to [facility] and will dc from [facility] to [facility] this afternoon. Dr Mckinney and Mei RN updated. Return call to MonicoSkylar. Contact inormation given for Beny Simon/urban [phone] & Rosette Teague [phone].    CALL REPORT -[phone]. [facility],  [address] [place] [address].    Row Name 11/23/20 1553       Plan    Plan  plan return to [facility]    Plan Comments  Spoke with Rosette Mei/ [facility]. Informed that patient is clinically stable for dc and multiple referrals have been made. Two facilities are reviewing case for admission. Informed they should plan to pick patient up this afternoon and if either [facility] or [facility] calls CM and is able to accept patient, CM will notify Rosette. She states they can pick patinet up in an hour(1545). CALL REPORT TO MARIBEL CARRION [phone]. Dr Mckinney updated, Mei RN updated -  at main entrance @ 1545. Call to Beny Simon/urban [phone] IMM updated and informed of plan for patient to return to [facility] today. CM will follow through dc.    Row Name 11/23/20 0474       Plan    Plan  to be determined    Plan Comments  Per Nola Hanley - still reviewing case. CM will continue to follow.        Discharge Codes    No documentation.       Expected Discharge Date and Time     Expected Discharge Date Expected  Discharge Time    Nov 23, 2020             Bill Trujillo RN

## 2020-11-23 NOTE — PLAN OF CARE
Goal Outcome Evaluation:  Plan of Care Reviewed With: patient  Progress: no change  Outcome Summary: Continues on pureed diet and nectar thick liquids. Turned and repositioned frequently. [place]ak care provided with each incontinent episode. No signs of pain/discomfort. 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 Heels floated. Call light within reach, safety measures in place.

## 2020-11-23 NOTE — NURSING NOTE
Continued Stay Note  JOANN Nelson     Patient Name: Arian Persaud  MRN: [id number]  Today's Date: 11/23/2020    Admit Date: 11/14/2020    Discharge Plan     Row Name 11/23/20 1446       Plan    Plan  plan return to [facility]    Plan Comments  Spoke with Rosette Mei/ [facility]. Informed that patient is clinically stable for dc and multiple referrals have been made. Two facilities are reviewing case for admission. Informed they should plan to pick patient up this afternoon and if either [facility] or [facility] calls CM and is able to accept patient, CM will notify Rosette. She states they can pick patientt up in an hour(1545). CALL REPORT TO MARIBEL CARRION [phone]. Dr Mckinney updated, Amy FERNÁNDEZ updated -  at main entrance @ 1545. Call to Beny Simon/guardian [phone] IMM updated and informed of plan for patient to return to [facility] today. CM will follow through dc.    Row Name 11/23/20 1257       Plan    Plan  to be determined    Plan Comments  Per Cheri/Nadeem Hanley - still reviewing case. CM will continue to follow.        Discharge Codes    No documentation.             Bill Trujillo RN

## 2020-11-23 NOTE — PROGRESS NOTES
Adult Nutrition  Assessment/PES    Patient Name:  Arian Persaud  YOB: 1955  MRN: [id number]  Admit Date:  11/14/2020    Assessment Date:  11/23/2020    Comments:  Encourage po and Boost Plus at at meals, assist all meals.   Po appears low still ( typical at new place as reported) .     Reason for Assessment     Row Name 11/23/20 1506          Reason for Assessment    Reason For Assessment  follow-up protocol     Diagnosis  infection/sepsis Aspiration PNA, NSTEMI, Malnutrition hx Cogitive Defceits, dementia, contractures         Nutrition/Diet History     Row Name 11/23/20 1507          Nutrition/Diet History    Typical Food/Fluid Intake  Pt with RN at visit earlier today. Per notes placement vs [facility]           Labs/Tests/Procedures/Meds     Row Name 11/23/20 1507          Labs/Procedures/Meds    Lab Results Reviewed  reviewed     Lab Results Comments  WNL        Diagnostic Tests/Procedures    Diagnostic Test/Procedure Reviewed  reviewed        Medications    Pertinent Medications Reviewed  reviewed     Pertinent Medications Comments  MVT             Nutrition Prescription Ordered     Row Name 11/23/20 1507          Nutrition Prescription PO    Current PO Diet  Pureed     Fluid Consistency  Nectar/syrup thick     Supplement  Boost Plus (Ensure Enlive, Ensure Plus)     Supplement Frequency  3 times a day         Evaluation of Received Nutrient/Fluid Intake     Row Name 11/23/20 1508          Fluid Intake Evaluation    Oral Fluid (mL)  [address] x 3, 30%        PO Evaluation    % PO Intake  0-50% of meals recorded, pt requires assistance with feeding               Problem/Interventions:      Problem 3     Row Name 11/23/20 1505          Nutrition Diagnoses Problem 3    Problem 3  Malnutrition     Etiology (related to)  Factors Affecting Nutrition;Medical Diagnosis     Signs/Symptoms (evidenced by)  Report/Observation         Problem 4     Row Name 11/23/20 1509          Nutrition Diagnoses Problem 4     Problem 4  Swallowing Difficulty     Etiology (related to)  Factors Affecting Nutrition;Medical Diagnosis     Signs/Symptoms (evidenced by)  Report/Observation;SLP/Swallow eval         Intervention Goal     Row Name 11/23/20 1509          Intervention Goal    PO  PO intake (%)     PO Intake %  50 % or greater of po/supplements     Weight  Maintain weight         Nutrition Intervention     Row Name 11/23/20 1510          Nutrition Intervention    RD/Tech Action  Encourage intake;Follow Tx progress           Education/Evaluation     Row Name 11/23/20 7690          Education    Education  Education not appropriate at this time        Monitor/Evaluation    Monitor  Per protocol;I&O;PO intake;Supplement intake;Pertinent labs;Weight           Electronically signed by:  Wen Maxwell RD  11/23/20 15:10 EST

## 2020-11-24 ENCOUNTER — TELEPHONE (OUTPATIENT)
Dept: SOCIAL WORK | Facility: HOSPITAL | Age: 65
End: 2020-11-24

## 2020-11-24 NOTE — TELEPHONE ENCOUNTER
Rec'd call from Monico/Skylar [facility] r/t patient admission on 11/23/20. Requesting PASARR form. Verified contact number for Beny Simon/guardian [phone] and Rosette Mei/ @ [facility] [phone]. PASARR form faxed to [phone]. Contact for Drew Lopez/urban supervisor [phone] provided on face sheet.

## 2020-11-24 NOTE — NURSING NOTE
Case Management Discharge Note      Final Note: [facility]    Provided Post Acute Provider List?: N/A  Provided Post Acute Provider Quality & Resource List?: N/A    Selected Continued Care - Discharged on 11/23/2020 Admission date: 11/14/2020 - Discharge disposition: Skilled Nursing Facility (DC - External)    Destination Coordination complete    Service Provider Selected Services Address Phone Fax Patient Preferred    [facility]  Intermediate Care [address] [place] [phone] --          Durable Medical Equipment    No services have been selected for the patient.              Dialysis/Infusion    No services have been selected for the patient.              Home Medical Care    No services have been selected for the patient.              Therapy    No services have been selected for the patient.              Community Resources    No services have been selected for the patient.                       Final Discharge Disposition Code: 03 - skilled nursing facility (SNF)

## 2020-11-26 ENCOUNTER — APPOINTMENT (OUTPATIENT)
Dept: CT IMAGING | Facility: HOSPITAL | Age: 65
End: 2020-11-26

## 2020-11-26 ENCOUNTER — HOSPITAL ENCOUNTER (INPATIENT)
Facility: HOSPITAL | Age: 65
LOS: 10 days | Discharge: NURSING, REHAB OR LONG-TERM CARE | End: 2020-12-06
Attending: EMERGENCY MEDICINE | Admitting: INTERNAL MEDICINE

## 2020-11-26 ENCOUNTER — APPOINTMENT (OUTPATIENT)
Dept: GENERAL RADIOLOGY | Facility: HOSPITAL | Age: 65
End: 2020-11-26

## 2020-11-26 DIAGNOSIS — J69.0 ASPIRATION PNEUMONIA, UNSPECIFIED ASPIRATION PNEUMONIA TYPE, UNSPECIFIED LATERALITY, UNSPECIFIED PART OF LUNG (HCC): ICD-10-CM

## 2020-11-26 DIAGNOSIS — F03.91 DEMENTIA WITH BEHAVIORAL DISTURBANCE, UNSPECIFIED DEMENTIA TYPE: ICD-10-CM

## 2020-11-26 DIAGNOSIS — R13.12 OROPHARYNGEAL DYSPHAGIA: ICD-10-CM

## 2020-11-26 DIAGNOSIS — R41.82 ALTERED MENTAL STATUS, UNSPECIFIED ALTERED MENTAL STATUS TYPE: ICD-10-CM

## 2020-11-26 DIAGNOSIS — R77.8 ELEVATED TROPONIN: ICD-10-CM

## 2020-11-26 DIAGNOSIS — R13.10 DYSPHAGIA, UNSPECIFIED TYPE: ICD-10-CM

## 2020-11-26 DIAGNOSIS — J96.01 ACUTE RESPIRATORY FAILURE WITH HYPOXIA (HCC): Primary | ICD-10-CM

## 2020-11-26 DIAGNOSIS — E87.0 HYPERNATREMIA: ICD-10-CM

## 2020-11-26 LAB
ALBUMIN SERPL-MCNC: 3.6 G/DL (ref 3.5–5.2)
ALBUMIN/GLOB SERPL: 1.1 G/DL
ALP SERPL-CCNC: 68 U/L (ref 39–117)
ALT SERPL W P-5'-P-CCNC: 9 U/L (ref 1–41)
ANION GAP SERPL CALCULATED.3IONS-SCNC: 13.1 MMOL/L (ref 5–15)
APTT PPP: 22.8 SECONDS (ref 22.7–35.4)
APTT PPP: 28.3 SECONDS (ref 22.7–35.4)
ARTERIAL PATENCY WRIST A: POSITIVE
ARTERIAL PATENCY WRIST A: POSITIVE
AST SERPL-CCNC: 21 U/L (ref 1–40)
ATMOSPHERIC PRESS: 752.1 MMHG
ATMOSPHERIC PRESS: 752.5 MMHG
B PARAPERT DNA SPEC QL NAA+PROBE: NOT DETECTED
B PERT DNA SPEC QL NAA+PROBE: NOT DETECTED
BACTERIA UR QL AUTO: ABNORMAL /HPF
BASE EXCESS BLDA CALC-SCNC: 0.5 MMOL/L (ref 0–2)
BASE EXCESS BLDA CALC-SCNC: 3.1 MMOL/L (ref 0–2)
BASOPHILS # BLD AUTO: 0.03 10*3/MM3 (ref 0–0.2)
BASOPHILS # BLD AUTO: 0.04 10*3/MM3 (ref 0–0.2)
BASOPHILS NFR BLD AUTO: 0.3 % (ref 0–1.5)
BASOPHILS NFR BLD AUTO: 0.3 % (ref 0–1.5)
BDY SITE: ABNORMAL
BDY SITE: ABNORMAL
BILIRUB SERPL-MCNC: 0.4 MG/DL (ref 0–1.2)
BILIRUB UR QL STRIP: NEGATIVE
BUN SERPL-MCNC: 40 MG/DL (ref 8–23)
BUN/CREAT SERPL: 32.8 (ref 7–25)
C PNEUM DNA NPH QL NAA+NON-PROBE: NOT DETECTED
CALCIUM SPEC-SCNC: 9.5 MG/DL (ref 8.6–10.5)
CHLORIDE SERPL-SCNC: 111 MMOL/L (ref 98–107)
CK SERPL-CCNC: 60 U/L (ref 20–200)
CLARITY UR: ABNORMAL
CO2 SERPL-SCNC: 28.9 MMOL/L (ref 22–29)
COLOR UR: YELLOW
CREAT SERPL-MCNC: 1.22 MG/DL (ref 0.76–1.27)
CRP SERPL-MCNC: 4.04 MG/DL (ref 0–0.5)
D-LACTATE SERPL-SCNC: 1.4 MMOL/L (ref 0.5–2)
DEPRECATED RDW RBC AUTO: 43.4 FL (ref 37–54)
DEPRECATED RDW RBC AUTO: 44.4 FL (ref 37–54)
EOSINOPHIL # BLD AUTO: 0 10*3/MM3 (ref 0–0.4)
EOSINOPHIL # BLD AUTO: 0 10*3/MM3 (ref 0–0.4)
EOSINOPHIL NFR BLD AUTO: 0 % (ref 0.3–6.2)
EOSINOPHIL NFR BLD AUTO: 0 % (ref 0.3–6.2)
ERYTHROCYTE [DISTWIDTH] IN BLOOD BY AUTOMATED COUNT: 13.2 % (ref 12.3–15.4)
ERYTHROCYTE [DISTWIDTH] IN BLOOD BY AUTOMATED COUNT: 13.8 % (ref 12.3–15.4)
FERRITIN SERPL-MCNC: 137 NG/ML (ref 30–400)
FLUAV SUBTYP SPEC NAA+PROBE: NOT DETECTED
FLUBV RNA ISLT QL NAA+PROBE: NOT DETECTED
GFR SERPL CREATININE-BSD FRML MDRD: 60 ML/MIN/1.73
GLOBULIN UR ELPH-MCNC: 3.4 GM/DL
GLUCOSE BLDC GLUCOMTR-MCNC: 144 MG/DL (ref 70–130)
GLUCOSE BLDC GLUCOMTR-MCNC: 171 MG/DL (ref 70–130)
GLUCOSE BLDC GLUCOMTR-MCNC: 223 MG/DL (ref 70–130)
GLUCOSE SERPL-MCNC: 134 MG/DL (ref 65–99)
GLUCOSE UR STRIP-MCNC: NEGATIVE MG/DL
HADV DNA SPEC NAA+PROBE: NOT DETECTED
HCO3 BLDA-SCNC: 25.1 MMOL/L (ref 22–28)
HCO3 BLDA-SCNC: 29.1 MMOL/L (ref 22–28)
HCOV 229E RNA SPEC QL NAA+PROBE: NOT DETECTED
HCOV HKU1 RNA SPEC QL NAA+PROBE: NOT DETECTED
HCOV NL63 RNA SPEC QL NAA+PROBE: NOT DETECTED
HCOV OC43 RNA SPEC QL NAA+PROBE: NOT DETECTED
HCT VFR BLD AUTO: 41.8 % (ref 37.5–51)
HCT VFR BLD AUTO: 45.7 % (ref 37.5–51)
HGB BLD-MCNC: 13.2 G/DL (ref 13–17.7)
HGB BLD-MCNC: 14.9 G/DL (ref 13–17.7)
HGB UR QL STRIP.AUTO: ABNORMAL
HMPV RNA NPH QL NAA+NON-PROBE: NOT DETECTED
HPIV1 RNA SPEC QL NAA+PROBE: NOT DETECTED
HPIV2 RNA SPEC QL NAA+PROBE: NOT DETECTED
HPIV3 RNA NPH QL NAA+PROBE: NOT DETECTED
HPIV4 P GENE NPH QL NAA+PROBE: NOT DETECTED
HYALINE CASTS UR QL AUTO: ABNORMAL /LPF
IMM GRANULOCYTES # BLD AUTO: 0.06 10*3/MM3 (ref 0–0.05)
IMM GRANULOCYTES NFR BLD AUTO: 0.4 % (ref 0–0.5)
INHALED O2 CONCENTRATION: 100 %
INHALED O2 CONCENTRATION: 100 %
INR PPP: 1.17 (ref 0.9–1.1)
INR PPP: 1.42 (ref 0.9–1.1)
KETONES UR QL STRIP: ABNORMAL
LDH SERPL-CCNC: 314 U/L (ref 135–225)
LEUKOCYTE ESTERASE UR QL STRIP.AUTO: ABNORMAL
LYMPHOCYTES # BLD AUTO: 0.32 10*3/MM3 (ref 0.7–3.1)
LYMPHOCYTES # BLD AUTO: 0.56 10*3/MM3 (ref 0.7–3.1)
LYMPHOCYTES NFR BLD AUTO: 2.2 % (ref 19.6–45.3)
LYMPHOCYTES NFR BLD AUTO: 5.8 % (ref 19.6–45.3)
M PNEUMO IGG SER IA-ACNC: NOT DETECTED
MAGNESIUM SERPL-MCNC: 2.3 MG/DL (ref 1.6–2.4)
MCH RBC QN AUTO: 28.4 PG (ref 26.6–33)
MCH RBC QN AUTO: 28.8 PG (ref 26.6–33)
MCHC RBC AUTO-ENTMCNC: 31.6 G/DL (ref 31.5–35.7)
MCHC RBC AUTO-ENTMCNC: 32.6 G/DL (ref 31.5–35.7)
MCV RBC AUTO: 88.2 FL (ref 79–97)
MCV RBC AUTO: 89.9 FL (ref 79–97)
MODALITY: ABNORMAL
MODALITY: ABNORMAL
MONOCYTES # BLD AUTO: 0.54 10*3/MM3 (ref 0.1–0.9)
MONOCYTES # BLD AUTO: 0.82 10*3/MM3 (ref 0.1–0.9)
MONOCYTES NFR BLD AUTO: 5.6 % (ref 5–12)
MONOCYTES NFR BLD AUTO: 5.8 % (ref 5–12)
MRSA DNA SPEC QL NAA+PROBE: NORMAL
MUCOUS THREADS URNS QL MICRO: ABNORMAL /HPF
NEUTROPHILS NFR BLD AUTO: 13 10*3/MM3 (ref 1.7–7)
NEUTROPHILS NFR BLD AUTO: 8.41 10*3/MM3 (ref 1.7–7)
NEUTROPHILS NFR BLD AUTO: 87.9 % (ref 42.7–76)
NEUTROPHILS NFR BLD AUTO: 91.3 % (ref 42.7–76)
NITRITE UR QL STRIP: NEGATIVE
NRBC BLD AUTO-RTO: 0 /100 WBC (ref 0–0.2)
NT-PROBNP SERPL-MCNC: 811.6 PG/ML (ref 0–900)
O2 A-A PPRESDIFF RESPIRATORY: 0.1 MMHG
O2 A-A PPRESDIFF RESPIRATORY: 0.1 MMHG
PCO2 BLDA: 39.4 MM HG (ref 35–45)
PCO2 BLDA: 48.2 MM HG (ref 35–45)
PEEP RESPIRATORY: 12 CM[H2O]
PEEP RESPIRATORY: 12 CM[H2O]
PH BLDA: 7.39 PH UNITS (ref 7.35–7.45)
PH BLDA: 7.41 PH UNITS (ref 7.35–7.45)
PH UR STRIP.AUTO: <=5 [PH] (ref 5–8)
PLATELET # BLD AUTO: 187 10*3/MM3 (ref 140–450)
PLATELET # BLD AUTO: 300 10*3/MM3 (ref 140–450)
PMV BLD AUTO: 8.8 FL (ref 6–12)
PMV BLD AUTO: 9.2 FL (ref 6–12)
PO2 BLDA: 100.1 MM HG (ref 80–100)
PO2 BLDA: 58.3 MM HG (ref 80–100)
POTASSIUM SERPL-SCNC: 3.6 MMOL/L (ref 3.5–5.2)
PROCALCITONIN SERPL-MCNC: 0.15 NG/ML (ref 0–0.25)
PROT SERPL-MCNC: 7 G/DL (ref 6–8.5)
PROT UR QL STRIP: ABNORMAL
PROTHROMBIN TIME: 14.7 SECONDS (ref 11.7–14.2)
PROTHROMBIN TIME: 17.1 SECONDS (ref 11.7–14.2)
RBC # BLD AUTO: 4.65 10*6/MM3 (ref 4.14–5.8)
RBC # BLD AUTO: 5.18 10*6/MM3 (ref 4.14–5.8)
RBC # UR: ABNORMAL /HPF
REF LAB TEST METHOD: ABNORMAL
RHINOVIRUS RNA SPEC NAA+PROBE: NOT DETECTED
RSV RNA NPH QL NAA+NON-PROBE: NOT DETECTED
SAO2 % BLDCOA: 89.1 % (ref 92–99)
SAO2 % BLDCOA: 97.8 % (ref 92–99)
SARS-COV-2 RNA NPH QL NAA+NON-PROBE: NOT DETECTED
SET MECH RESP RATE: 20
SET MECH RESP RATE: 28
SODIUM SERPL-SCNC: 153 MMOL/L (ref 136–145)
SP GR UR STRIP: 1.03 (ref 1–1.03)
SQUAMOUS #/AREA URNS HPF: ABNORMAL /HPF
TOTAL RATE: 30 BREATHS/MINUTE
TOTAL RATE: 30 BREATHS/MINUTE
TROPONIN T SERPL-MCNC: 0.05 NG/ML (ref 0–0.03)
TROPONIN T SERPL-MCNC: 0.06 NG/ML (ref 0–0.03)
UROBILINOGEN UR QL STRIP: ABNORMAL
VENTILATOR MODE: AC
VENTILATOR MODE: AC
VT ON VENT VENT: 450 ML
WBC # BLD AUTO: 14.24 10*3/MM3 (ref 3.4–10.8)
WBC # BLD AUTO: 9.58 10*3/MM3 (ref 3.4–10.8)
WBC UR QL AUTO: ABNORMAL /HPF

## 2020-11-26 PROCEDURE — P9612 CATHETERIZE FOR URINE SPEC: HCPCS

## 2020-11-26 PROCEDURE — 84484 ASSAY OF TROPONIN QUANT: CPT | Performed by: INTERNAL MEDICINE

## 2020-11-26 PROCEDURE — 71275 CT ANGIOGRAPHY CHEST: CPT

## 2020-11-26 PROCEDURE — 82803 BLOOD GASES ANY COMBINATION: CPT

## 2020-11-26 PROCEDURE — 5A1945Z RESPIRATORY VENTILATION, 24-96 CONSECUTIVE HOURS: ICD-10-PCS | Performed by: INTERNAL MEDICINE

## 2020-11-26 PROCEDURE — 25010000002 PROPOFOL 10 MG/ML EMULSION: Performed by: EMERGENCY MEDICINE

## 2020-11-26 PROCEDURE — 36415 COLL VENOUS BLD VENIPUNCTURE: CPT

## 2020-11-26 PROCEDURE — 81001 URINALYSIS AUTO W/SCOPE: CPT | Performed by: EMERGENCY MEDICINE

## 2020-11-26 PROCEDURE — 71045 X-RAY EXAM CHEST 1 VIEW: CPT

## 2020-11-26 PROCEDURE — 82728 ASSAY OF FERRITIN: CPT | Performed by: EMERGENCY MEDICINE

## 2020-11-26 PROCEDURE — 94799 UNLISTED PULMONARY SVC/PX: CPT

## 2020-11-26 PROCEDURE — 36600 WITHDRAWAL OF ARTERIAL BLOOD: CPT

## 2020-11-26 PROCEDURE — 63710000001 INSULIN REGULAR HUMAN PER 5 UNITS: Performed by: INTERNAL MEDICINE

## 2020-11-26 PROCEDURE — 85610 PROTHROMBIN TIME: CPT | Performed by: INTERNAL MEDICINE

## 2020-11-26 PROCEDURE — 31500 INSERT EMERGENCY AIRWAY: CPT

## 2020-11-26 PROCEDURE — 83735 ASSAY OF MAGNESIUM: CPT | Performed by: EMERGENCY MEDICINE

## 2020-11-26 PROCEDURE — 0BH17EZ INSERTION OF ENDOTRACHEAL AIRWAY INTO TRACHEA, VIA NATURAL OR ARTIFICIAL OPENING: ICD-10-PCS | Performed by: INTERNAL MEDICINE

## 2020-11-26 PROCEDURE — 87040 BLOOD CULTURE FOR BACTERIA: CPT | Performed by: EMERGENCY MEDICINE

## 2020-11-26 PROCEDURE — 85025 COMPLETE CBC W/AUTO DIFF WBC: CPT | Performed by: INTERNAL MEDICINE

## 2020-11-26 PROCEDURE — 82962 GLUCOSE BLOOD TEST: CPT

## 2020-11-26 PROCEDURE — 86140 C-REACTIVE PROTEIN: CPT | Performed by: EMERGENCY MEDICINE

## 2020-11-26 PROCEDURE — 99285 EMERGENCY DEPT VISIT HI MDM: CPT

## 2020-11-26 PROCEDURE — 82550 ASSAY OF CK (CPK): CPT | Performed by: EMERGENCY MEDICINE

## 2020-11-26 PROCEDURE — 93010 ELECTROCARDIOGRAM REPORT: CPT | Performed by: INTERNAL MEDICINE

## 2020-11-26 PROCEDURE — 25010000002 VANCOMYCIN PER 500 MG: Performed by: INTERNAL MEDICINE

## 2020-11-26 PROCEDURE — 85610 PROTHROMBIN TIME: CPT | Performed by: EMERGENCY MEDICINE

## 2020-11-26 PROCEDURE — 70450 CT HEAD/BRAIN W/O DYE: CPT

## 2020-11-26 PROCEDURE — 25010000002 HEPARIN (PORCINE) PER 1000 UNITS: Performed by: INTERNAL MEDICINE

## 2020-11-26 PROCEDURE — 87641 MR-STAPH DNA AMP PROBE: CPT | Performed by: INTERNAL MEDICINE

## 2020-11-26 PROCEDURE — 83880 ASSAY OF NATRIURETIC PEPTIDE: CPT | Performed by: EMERGENCY MEDICINE

## 2020-11-26 PROCEDURE — 80053 COMPREHEN METABOLIC PANEL: CPT | Performed by: EMERGENCY MEDICINE

## 2020-11-26 PROCEDURE — 0 IOPAMIDOL PER 1 ML: Performed by: INTERNAL MEDICINE

## 2020-11-26 PROCEDURE — 25010000002 LEVOFLOXACIN PER 250 MG: Performed by: EMERGENCY MEDICINE

## 2020-11-26 PROCEDURE — 93005 ELECTROCARDIOGRAM TRACING: CPT | Performed by: EMERGENCY MEDICINE

## 2020-11-26 PROCEDURE — 0202U NFCT DS 22 TRGT SARS-COV-2: CPT | Performed by: EMERGENCY MEDICINE

## 2020-11-26 PROCEDURE — 84484 ASSAY OF TROPONIN QUANT: CPT | Performed by: EMERGENCY MEDICINE

## 2020-11-26 PROCEDURE — 83615 LACTATE (LD) (LDH) ENZYME: CPT | Performed by: EMERGENCY MEDICINE

## 2020-11-26 PROCEDURE — 85025 COMPLETE CBC W/AUTO DIFF WBC: CPT | Performed by: EMERGENCY MEDICINE

## 2020-11-26 PROCEDURE — 85730 THROMBOPLASTIN TIME PARTIAL: CPT | Performed by: EMERGENCY MEDICINE

## 2020-11-26 PROCEDURE — 85730 THROMBOPLASTIN TIME PARTIAL: CPT | Performed by: INTERNAL MEDICINE

## 2020-11-26 PROCEDURE — 83605 ASSAY OF LACTIC ACID: CPT | Performed by: EMERGENCY MEDICINE

## 2020-11-26 PROCEDURE — 84145 PROCALCITONIN (PCT): CPT | Performed by: EMERGENCY MEDICINE

## 2020-11-26 PROCEDURE — 94002 VENT MGMT INPAT INIT DAY: CPT

## 2020-11-26 RX ORDER — SODIUM CHLORIDE 0.9 % (FLUSH) 0.9 %
10 SYRINGE (ML) INJECTION EVERY 12 HOURS SCHEDULED
Status: DISCONTINUED | OUTPATIENT
Start: 2020-11-26 | End: 2020-12-07 | Stop reason: HOSPADM

## 2020-11-26 RX ORDER — FENTANYL CITRATE 50 UG/ML
100 INJECTION, SOLUTION INTRAMUSCULAR; INTRAVENOUS
Status: DISCONTINUED | OUTPATIENT
Start: 2020-11-26 | End: 2020-11-28

## 2020-11-26 RX ORDER — POTASSIUM CHLORIDE 750 MG/1
40 TABLET, FILM COATED, EXTENDED RELEASE ORAL AS NEEDED
Status: DISCONTINUED | OUTPATIENT
Start: 2020-11-26 | End: 2020-12-07 | Stop reason: HOSPADM

## 2020-11-26 RX ORDER — VANCOMYCIN HYDROCHLORIDE 1 G/200ML
20 INJECTION, SOLUTION INTRAVENOUS ONCE
Status: COMPLETED | OUTPATIENT
Start: 2020-11-26 | End: 2020-11-26

## 2020-11-26 RX ORDER — DEXTROSE AND SODIUM CHLORIDE 5; .45 G/100ML; G/100ML
100 INJECTION, SOLUTION INTRAVENOUS CONTINUOUS
Status: DISCONTINUED | OUTPATIENT
Start: 2020-11-26 | End: 2020-11-28

## 2020-11-26 RX ORDER — SODIUM CHLORIDE 0.9 % (FLUSH) 0.9 %
10 SYRINGE (ML) INJECTION AS NEEDED
Status: DISCONTINUED | OUTPATIENT
Start: 2020-11-26 | End: 2020-12-07 | Stop reason: HOSPADM

## 2020-11-26 RX ORDER — HEPARIN SODIUM 10000 [USP'U]/100ML
12 INJECTION, SOLUTION INTRAVENOUS
Status: DISCONTINUED | OUTPATIENT
Start: 2020-11-26 | End: 2020-11-28

## 2020-11-26 RX ORDER — LEVOFLOXACIN 5 MG/ML
750 INJECTION, SOLUTION INTRAVENOUS ONCE
Status: COMPLETED | OUTPATIENT
Start: 2020-11-26 | End: 2020-11-26

## 2020-11-26 RX ORDER — DEXTROSE MONOHYDRATE 25 G/50ML
25 INJECTION, SOLUTION INTRAVENOUS
Status: DISCONTINUED | OUTPATIENT
Start: 2020-11-26 | End: 2020-12-07 | Stop reason: HOSPADM

## 2020-11-26 RX ORDER — ACETAMINOPHEN 325 MG/1
650 TABLET ORAL EVERY 6 HOURS PRN
Status: DISCONTINUED | OUTPATIENT
Start: 2020-11-26 | End: 2020-12-07 | Stop reason: HOSPADM

## 2020-11-26 RX ORDER — POTASSIUM CHLORIDE 7.45 MG/ML
10 INJECTION INTRAVENOUS
Status: DISCONTINUED | OUTPATIENT
Start: 2020-11-26 | End: 2020-12-07 | Stop reason: HOSPADM

## 2020-11-26 RX ORDER — FENTANYL CITRATE 50 UG/ML
100 INJECTION, SOLUTION INTRAMUSCULAR; INTRAVENOUS ONCE
Status: DISCONTINUED | OUTPATIENT
Start: 2020-11-26 | End: 2020-11-30

## 2020-11-26 RX ORDER — HEPARIN SODIUM 5000 [USP'U]/ML
30-60 INJECTION, SOLUTION INTRAVENOUS; SUBCUTANEOUS EVERY 6 HOURS PRN
Status: DISCONTINUED | OUTPATIENT
Start: 2020-11-26 | End: 2020-11-28

## 2020-11-26 RX ORDER — NICOTINE POLACRILEX 4 MG
15 LOZENGE BUCCAL
Status: DISCONTINUED | OUTPATIENT
Start: 2020-11-26 | End: 2020-12-07 | Stop reason: HOSPADM

## 2020-11-26 RX ORDER — POTASSIUM CHLORIDE 1.5 G/1.77G
40 POWDER, FOR SOLUTION ORAL AS NEEDED
Status: DISCONTINUED | OUTPATIENT
Start: 2020-11-26 | End: 2020-12-07 | Stop reason: HOSPADM

## 2020-11-26 RX ORDER — VANCOMYCIN HYDROCHLORIDE 1 G/200ML
1000 INJECTION, SOLUTION INTRAVENOUS EVERY 24 HOURS
Status: DISCONTINUED | OUTPATIENT
Start: 2020-11-27 | End: 2020-11-27

## 2020-11-26 RX ORDER — HEPARIN SODIUM 5000 [USP'U]/ML
60 INJECTION, SOLUTION INTRAVENOUS; SUBCUTANEOUS ONCE
Status: COMPLETED | OUTPATIENT
Start: 2020-11-26 | End: 2020-11-26

## 2020-11-26 RX ORDER — LEVOFLOXACIN 5 MG/ML
250 INJECTION, SOLUTION INTRAVENOUS EVERY 24 HOURS
Status: DISCONTINUED | OUTPATIENT
Start: 2020-11-26 | End: 2020-11-27

## 2020-11-26 RX ORDER — ACETAMINOPHEN 650 MG/1
975 SUPPOSITORY RECTAL ONCE
Status: COMPLETED | OUTPATIENT
Start: 2020-11-26 | End: 2020-11-26

## 2020-11-26 RX ADMIN — SODIUM CHLORIDE 1000 ML: 9 INJECTION, SOLUTION INTRAVENOUS at 15:40

## 2020-11-26 RX ADMIN — AZTREONAM 2 G: 2 INJECTION, POWDER, LYOPHILIZED, FOR SOLUTION INTRAMUSCULAR; INTRAVENOUS at 18:19

## 2020-11-26 RX ADMIN — SODIUM CHLORIDE 1497 ML: 9 INJECTION, SOLUTION INTRAVENOUS at 16:03

## 2020-11-26 RX ADMIN — LEVOFLOXACIN 750 MG: 5 INJECTION, SOLUTION INTRAVENOUS at 16:29

## 2020-11-26 RX ADMIN — HEPARIN SODIUM 2800 UNITS: 5000 INJECTION, SOLUTION INTRAVENOUS; SUBCUTANEOUS at 22:11

## 2020-11-26 RX ADMIN — VANCOMYCIN HYDROCHLORIDE 1000 MG: 1 INJECTION, SOLUTION INTRAVENOUS at 18:19

## 2020-11-26 RX ADMIN — SODIUM CHLORIDE, PRESERVATIVE FREE 10 ML: 5 INJECTION INTRAVENOUS at 20:47

## 2020-11-26 RX ADMIN — PROPOFOL 5 MCG/KG/MIN: 10 INJECTION, EMULSION INTRAVENOUS at 15:39

## 2020-11-26 RX ADMIN — HEPARIN SODIUM 12 UNITS/KG/HR: 10000 INJECTION, SOLUTION INTRAVENOUS at 22:14

## 2020-11-26 RX ADMIN — IOPAMIDOL 95 ML: 755 INJECTION, SOLUTION INTRAVENOUS at 16:57

## 2020-11-26 RX ADMIN — ACETAMINOPHEN 975 MG: 650 SUPPOSITORY RECTAL at 15:38

## 2020-11-26 RX ADMIN — INSULIN HUMAN 3 UNITS: 100 INJECTION, SOLUTION PARENTERAL at 20:47

## 2020-11-26 RX ADMIN — DEXTROSE AND SODIUM CHLORIDE 100 ML/HR: 5; 450 INJECTION, SOLUTION INTRAVENOUS at 17:24

## 2020-11-27 ENCOUNTER — APPOINTMENT (OUTPATIENT)
Dept: CARDIOLOGY | Facility: HOSPITAL | Age: 65
End: 2020-11-27

## 2020-11-27 LAB
ANION GAP SERPL CALCULATED.3IONS-SCNC: 10 MMOL/L (ref 5–15)
AORTIC DIMENSIONLESS INDEX: 0.9 (DI)
APTT PPP: 46.5 SECONDS (ref 22.7–35.4)
APTT PPP: 47.8 SECONDS (ref 22.7–35.4)
APTT PPP: 94.9 SECONDS (ref 22.7–35.4)
ARTERIAL PATENCY WRIST A: POSITIVE
ATMOSPHERIC PRESS: 756.1 MMHG
BASE EXCESS BLDA CALC-SCNC: 2.3 MMOL/L (ref 0–2)
BASOPHILS # BLD AUTO: 0.03 10*3/MM3 (ref 0–0.2)
BASOPHILS NFR BLD AUTO: 0.3 % (ref 0–1.5)
BDY SITE: ABNORMAL
BH CV ECHO MEAS - ACS: 1.8 CM
BH CV ECHO MEAS - AO MAX PG (FULL): 1.5 MMHG
BH CV ECHO MEAS - AO MAX PG: 3 MMHG
BH CV ECHO MEAS - AO MEAN PG (FULL): 0 MMHG
BH CV ECHO MEAS - AO MEAN PG: 1 MMHG
BH CV ECHO MEAS - AO ROOT AREA (BSA CORRECTED): 2.1
BH CV ECHO MEAS - AO ROOT AREA: 8.6 CM^2
BH CV ECHO MEAS - AO ROOT DIAM: 3.3 CM
BH CV ECHO MEAS - AO V2 MAX: 87 CM/SEC
BH CV ECHO MEAS - AO V2 MEAN: 45.5 CM/SEC
BH CV ECHO MEAS - AO V2 VTI: 12 CM
BH CV ECHO MEAS - AVA(I,A): 2.7 CM^2
BH CV ECHO MEAS - AVA(I,D): 2.7 CM^2
BH CV ECHO MEAS - AVA(V,A): 2.2 CM^2
BH CV ECHO MEAS - AVA(V,D): 2.2 CM^2
BH CV ECHO MEAS - BSA(HAYCOCK): 1.5 M^2
BH CV ECHO MEAS - BSA: 1.6 M^2
BH CV ECHO MEAS - BZI_BMI: 15.2 KILOGRAMS/M^2
BH CV ECHO MEAS - BZI_METRIC_HEIGHT: 175.3 CM
BH CV ECHO MEAS - BZI_METRIC_WEIGHT: 46.7 KG
BH CV ECHO MEAS - EDV(CUBED): 27 ML
BH CV ECHO MEAS - EDV(MOD-SP2): 55 ML
BH CV ECHO MEAS - EDV(MOD-SP4): 52 ML
BH CV ECHO MEAS - EDV(TEICH): 35 ML
BH CV ECHO MEAS - EF(CUBED): 74.6 %
BH CV ECHO MEAS - EF(MOD-BP): 67 %
BH CV ECHO MEAS - EF(MOD-SP2): 65.5 %
BH CV ECHO MEAS - EF(MOD-SP4): 69.2 %
BH CV ECHO MEAS - EF(TEICH): 68.1 %
BH CV ECHO MEAS - ESV(CUBED): 6.9 ML
BH CV ECHO MEAS - ESV(MOD-SP2): 19 ML
BH CV ECHO MEAS - ESV(MOD-SP4): 16 ML
BH CV ECHO MEAS - ESV(TEICH): 11.2 ML
BH CV ECHO MEAS - FS: 36.7 %
BH CV ECHO MEAS - IVS/LVPW: 0.91
BH CV ECHO MEAS - IVSD: 1 CM
BH CV ECHO MEAS - LAT PEAK E' VEL: 6.2 CM/SEC
BH CV ECHO MEAS - LV DIASTOLIC VOL/BSA (35-75): 33.4 ML/M^2
BH CV ECHO MEAS - LV MASS(C)D: 88.5 GRAMS
BH CV ECHO MEAS - LV MASS(C)DI: 56.8 GRAMS/M^2
BH CV ECHO MEAS - LV MAX PG: 1.5 MMHG
BH CV ECHO MEAS - LV MEAN PG: 1 MMHG
BH CV ECHO MEAS - LV SYSTOLIC VOL/BSA (12-30): 10.3 ML/M^2
BH CV ECHO MEAS - LV V1 MAX: 62.1 CM/SEC
BH CV ECHO MEAS - LV V1 MEAN: 29.8 CM/SEC
BH CV ECHO MEAS - LV V1 VTI: 10.5 CM
BH CV ECHO MEAS - LVIDD: 3 CM
BH CV ECHO MEAS - LVIDS: 1.9 CM
BH CV ECHO MEAS - LVLD AP2: 6.5 CM
BH CV ECHO MEAS - LVLD AP4: 6.8 CM
BH CV ECHO MEAS - LVLS AP2: 6 CM
BH CV ECHO MEAS - LVLS AP4: 5.5 CM
BH CV ECHO MEAS - LVOT AREA (M): 3.1 CM^2
BH CV ECHO MEAS - LVOT AREA: 3.1 CM^2
BH CV ECHO MEAS - LVOT DIAM: 2 CM
BH CV ECHO MEAS - LVPWD: 1.1 CM
BH CV ECHO MEAS - MED PEAK E' VEL: 5 CM/SEC
BH CV ECHO MEAS - MV A MAX VEL: 96 CM/SEC
BH CV ECHO MEAS - MV DEC SLOPE: 197 CM/SEC^2
BH CV ECHO MEAS - MV DEC TIME: 269 SEC
BH CV ECHO MEAS - MV E MAX VEL: 50.9 CM/SEC
BH CV ECHO MEAS - MV E/A: 0.53
BH CV ECHO MEAS - MV MAX PG: 3.2 MMHG
BH CV ECHO MEAS - MV MEAN PG: 1 MMHG
BH CV ECHO MEAS - MV P1/2T MAX VEL: 57.4 CM/SEC
BH CV ECHO MEAS - MV P1/2T: 85.3 MSEC
BH CV ECHO MEAS - MV V2 MAX: 89.6 CM/SEC
BH CV ECHO MEAS - MV V2 MEAN: 45.1 CM/SEC
BH CV ECHO MEAS - MV V2 VTI: 16.6 CM
BH CV ECHO MEAS - MVA P1/2T LCG: 3.8 CM^2
BH CV ECHO MEAS - MVA(P1/2T): 2.6 CM^2
BH CV ECHO MEAS - MVA(VTI): 2 CM^2
BH CV ECHO MEAS - PA MAX PG (FULL): 2.8 MMHG
BH CV ECHO MEAS - PA MAX PG: 3.8 MMHG
BH CV ECHO MEAS - PA V2 MAX: 97.7 CM/SEC
BH CV ECHO MEAS - RAP SYSTOLE: 8 MMHG
BH CV ECHO MEAS - RV MAX PG: 0.98 MMHG
BH CV ECHO MEAS - RV MEAN PG: 0 MMHG
BH CV ECHO MEAS - RV V1 MAX: 49.5 CM/SEC
BH CV ECHO MEAS - RV V1 MEAN: 28.1 CM/SEC
BH CV ECHO MEAS - RV V1 VTI: 9 CM
BH CV ECHO MEAS - SI(AO): 65.9 ML/M^2
BH CV ECHO MEAS - SI(CUBED): 12.9 ML/M^2
BH CV ECHO MEAS - SI(LVOT): 21.2 ML/M^2
BH CV ECHO MEAS - SI(MOD-SP2): 23.1 ML/M^2
BH CV ECHO MEAS - SI(MOD-SP4): 23.1 ML/M^2
BH CV ECHO MEAS - SI(TEICH): 15.3 ML/M^2
BH CV ECHO MEAS - SV(AO): 102.6 ML
BH CV ECHO MEAS - SV(CUBED): 20.1 ML
BH CV ECHO MEAS - SV(LVOT): 33 ML
BH CV ECHO MEAS - SV(MOD-SP2): 36 ML
BH CV ECHO MEAS - SV(MOD-SP4): 36 ML
BH CV ECHO MEAS - SV(TEICH): 23.8 ML
BH CV ECHO MEAS - TAPSE (>1.6): 1.4 CM
BH CV ECHO MEAS - TR MAX VEL: 259 CM/SEC
BH CV ECHO MEASUREMENTS AVERAGE E/E' RATIO: 9.09
BH CV VAS BP LEFT ARM: NORMAL MMHG
BUN SERPL-MCNC: 32 MG/DL (ref 8–23)
BUN/CREAT SERPL: 32.3 (ref 7–25)
CALCIUM SPEC-SCNC: 8.7 MG/DL (ref 8.6–10.5)
CHLORIDE SERPL-SCNC: 114 MMOL/L (ref 98–107)
CO2 SERPL-SCNC: 25 MMOL/L (ref 22–29)
CREAT SERPL-MCNC: 0.99 MG/DL (ref 0.76–1.27)
DEPRECATED RDW RBC AUTO: 43 FL (ref 37–54)
EOSINOPHIL # BLD AUTO: 0.01 10*3/MM3 (ref 0–0.4)
EOSINOPHIL NFR BLD AUTO: 0.1 % (ref 0.3–6.2)
ERYTHROCYTE [DISTWIDTH] IN BLOOD BY AUTOMATED COUNT: 13.3 % (ref 12.3–15.4)
GFR SERPL CREATININE-BSD FRML MDRD: 77 ML/MIN/1.73
GLUCOSE BLDC GLUCOMTR-MCNC: 114 MG/DL (ref 70–130)
GLUCOSE BLDC GLUCOMTR-MCNC: 119 MG/DL (ref 70–130)
GLUCOSE BLDC GLUCOMTR-MCNC: 120 MG/DL (ref 70–130)
GLUCOSE BLDC GLUCOMTR-MCNC: 142 MG/DL (ref 70–130)
GLUCOSE BLDC GLUCOMTR-MCNC: 168 MG/DL (ref 70–130)
GLUCOSE BLDC GLUCOMTR-MCNC: 98 MG/DL (ref 70–130)
GLUCOSE SERPL-MCNC: 156 MG/DL (ref 65–99)
HCO3 BLDA-SCNC: 25.7 MMOL/L (ref 22–28)
HCT VFR BLD AUTO: 40.1 % (ref 37.5–51)
HGB BLD-MCNC: 13.2 G/DL (ref 13–17.7)
INHALED O2 CONCENTRATION: 50 %
LEFT ATRIUM VOLUME INDEX: 12.1 ML/M2
LV EF 2D ECHO EST: 67 %
LYMPHOCYTES # BLD AUTO: 0.88 10*3/MM3 (ref 0.7–3.1)
LYMPHOCYTES NFR BLD AUTO: 8.8 % (ref 19.6–45.3)
MAGNESIUM SERPL-MCNC: 2 MG/DL (ref 1.6–2.4)
MAXIMAL PREDICTED HEART RATE: 158 BPM
MCH RBC QN AUTO: 28.9 PG (ref 26.6–33)
MCHC RBC AUTO-ENTMCNC: 32.9 G/DL (ref 31.5–35.7)
MCV RBC AUTO: 87.9 FL (ref 79–97)
MODALITY: ABNORMAL
MONOCYTES # BLD AUTO: 0.5 10*3/MM3 (ref 0.1–0.9)
MONOCYTES NFR BLD AUTO: 5 % (ref 5–12)
NEUTROPHILS NFR BLD AUTO: 8.55 10*3/MM3 (ref 1.7–7)
NEUTROPHILS NFR BLD AUTO: 85.4 % (ref 42.7–76)
O2 A-A PPRESDIFF RESPIRATORY: 0.2 MMHG
PCO2 BLDA: 34.8 MM HG (ref 35–45)
PEEP RESPIRATORY: 8 CM[H2O]
PH BLDA: 7.48 PH UNITS (ref 7.35–7.45)
PHOSPHATE SERPL-MCNC: 1.3 MG/DL (ref 2.5–4.5)
PLATELET # BLD AUTO: 196 10*3/MM3 (ref 140–450)
PMV BLD AUTO: 8.9 FL (ref 6–12)
PO2 BLDA: 60.9 MM HG (ref 80–100)
POTASSIUM SERPL-SCNC: 2.7 MMOL/L (ref 3.5–5.2)
QT INTERVAL: 328 MS
RBC # BLD AUTO: 4.56 10*6/MM3 (ref 4.14–5.8)
SAO2 % BLDCOA: 92.7 % (ref 92–99)
SET MECH RESP RATE: 26
SODIUM SERPL-SCNC: 149 MMOL/L (ref 136–145)
STRESS TARGET HR: 134 BPM
TOTAL RATE: 26 BREATHS/MINUTE
VENTILATOR MODE: AC
VT ON VENT VENT: 450 ML
WBC # BLD AUTO: 10.01 10*3/MM3 (ref 3.4–10.8)

## 2020-11-27 PROCEDURE — 36600 WITHDRAWAL OF ARTERIAL BLOOD: CPT

## 2020-11-27 PROCEDURE — 94799 UNLISTED PULMONARY SVC/PX: CPT

## 2020-11-27 PROCEDURE — 82962 GLUCOSE BLOOD TEST: CPT

## 2020-11-27 PROCEDURE — 63710000001 INSULIN REGULAR HUMAN PER 5 UNITS: Performed by: INTERNAL MEDICINE

## 2020-11-27 PROCEDURE — 85730 THROMBOPLASTIN TIME PARTIAL: CPT | Performed by: INTERNAL MEDICINE

## 2020-11-27 PROCEDURE — 85025 COMPLETE CBC W/AUTO DIFF WBC: CPT | Performed by: INTERNAL MEDICINE

## 2020-11-27 PROCEDURE — 94003 VENT MGMT INPAT SUBQ DAY: CPT

## 2020-11-27 PROCEDURE — 82803 BLOOD GASES ANY COMBINATION: CPT

## 2020-11-27 PROCEDURE — 25010000003 POTASSIUM CHLORIDE 10 MEQ/100ML SOLUTION: Performed by: INTERNAL MEDICINE

## 2020-11-27 PROCEDURE — 25010000002 PERFLUTREN (DEFINITY) 8.476 MG IN SODIUM CHLORIDE 0.9 % 10 ML INJECTION: Performed by: INTERNAL MEDICINE

## 2020-11-27 PROCEDURE — 25010000002 CEFTRIAXONE PER 250 MG: Performed by: INTERNAL MEDICINE

## 2020-11-27 PROCEDURE — 25010000002 HEPARIN (PORCINE) PER 1000 UNITS: Performed by: INTERNAL MEDICINE

## 2020-11-27 PROCEDURE — 93306 TTE W/DOPPLER COMPLETE: CPT | Performed by: INTERNAL MEDICINE

## 2020-11-27 PROCEDURE — 25010000002 PROPOFOL 10 MG/ML EMULSION: Performed by: INTERNAL MEDICINE

## 2020-11-27 PROCEDURE — 99222 1ST HOSP IP/OBS MODERATE 55: CPT | Performed by: INTERNAL MEDICINE

## 2020-11-27 PROCEDURE — 83735 ASSAY OF MAGNESIUM: CPT | Performed by: INTERNAL MEDICINE

## 2020-11-27 PROCEDURE — 84100 ASSAY OF PHOSPHORUS: CPT | Performed by: INTERNAL MEDICINE

## 2020-11-27 PROCEDURE — 80048 BASIC METABOLIC PNL TOTAL CA: CPT | Performed by: INTERNAL MEDICINE

## 2020-11-27 PROCEDURE — 93306 TTE W/DOPPLER COMPLETE: CPT

## 2020-11-27 RX ORDER — CHLORHEXIDINE GLUCONATE 0.12 MG/ML
15 RINSE ORAL EVERY 12 HOURS SCHEDULED
Status: DISCONTINUED | OUTPATIENT
Start: 2020-11-27 | End: 2020-11-30

## 2020-11-27 RX ORDER — LEVOFLOXACIN 5 MG/ML
750 INJECTION, SOLUTION INTRAVENOUS EVERY 24 HOURS
Status: DISCONTINUED | OUTPATIENT
Start: 2020-11-27 | End: 2020-11-27

## 2020-11-27 RX ORDER — CEFTRIAXONE SODIUM 1 G/50ML
1 INJECTION, SOLUTION INTRAVENOUS EVERY 24 HOURS
Status: COMPLETED | OUTPATIENT
Start: 2020-11-27 | End: 2020-12-02

## 2020-11-27 RX ORDER — PANTOPRAZOLE SODIUM 40 MG/10ML
40 INJECTION, POWDER, LYOPHILIZED, FOR SOLUTION INTRAVENOUS
Status: DISCONTINUED | OUTPATIENT
Start: 2020-11-27 | End: 2020-11-30

## 2020-11-27 RX ADMIN — POTASSIUM PHOSPHATE, MONOBASIC AND POTASSIUM PHOSPHATE, DIBASIC 40 MMOL: 224; 236 INJECTION, SOLUTION, CONCENTRATE INTRAVENOUS at 13:48

## 2020-11-27 RX ADMIN — PERFLUTREN 2 ML: 6.52 INJECTION, SUSPENSION INTRAVENOUS at 10:00

## 2020-11-27 RX ADMIN — HEPARIN SODIUM 2800 UNITS: 5000 INJECTION, SOLUTION INTRAVENOUS; SUBCUTANEOUS at 06:50

## 2020-11-27 RX ADMIN — PANTOPRAZOLE SODIUM 40 MG: 40 INJECTION, POWDER, FOR SOLUTION INTRAVENOUS at 10:28

## 2020-11-27 RX ADMIN — CEFTRIAXONE SODIUM 1 G: 1 INJECTION, SOLUTION INTRAVENOUS at 14:31

## 2020-11-27 RX ADMIN — INSULIN HUMAN 5 UNITS: 100 INJECTION, SOLUTION PARENTERAL at 00:12

## 2020-11-27 RX ADMIN — CHLORHEXIDINE GLUCONATE 15 ML: 1.2 RINSE ORAL at 10:28

## 2020-11-27 RX ADMIN — METRONIDAZOLE 500 MG: 500 INJECTION, SOLUTION INTRAVENOUS at 15:58

## 2020-11-27 RX ADMIN — HEPARIN SODIUM 2800 UNITS: 5000 INJECTION, SOLUTION INTRAVENOUS; SUBCUTANEOUS at 23:38

## 2020-11-27 RX ADMIN — POTASSIUM CHLORIDE 10 MEQ: 7.46 INJECTION, SOLUTION INTRAVENOUS at 10:28

## 2020-11-27 RX ADMIN — METRONIDAZOLE 500 MG: 500 INJECTION, SOLUTION INTRAVENOUS at 20:55

## 2020-11-27 RX ADMIN — PROPOFOL 15 MCG/KG/MIN: 10 INJECTION, EMULSION INTRAVENOUS at 15:15

## 2020-11-27 RX ADMIN — SODIUM CHLORIDE, PRESERVATIVE FREE 10 ML: 5 INJECTION INTRAVENOUS at 09:29

## 2020-11-27 RX ADMIN — CHLORHEXIDINE GLUCONATE 15 ML: 1.2 RINSE ORAL at 20:54

## 2020-11-27 RX ADMIN — SODIUM CHLORIDE, PRESERVATIVE FREE 10 ML: 5 INJECTION INTRAVENOUS at 20:54

## 2020-11-28 ENCOUNTER — APPOINTMENT (OUTPATIENT)
Dept: GENERAL RADIOLOGY | Facility: HOSPITAL | Age: 65
End: 2020-11-28

## 2020-11-28 LAB
ANION GAP SERPL CALCULATED.3IONS-SCNC: 8.3 MMOL/L (ref 5–15)
APTT PPP: 79 SECONDS (ref 22.7–35.4)
ARTERIAL PATENCY WRIST A: POSITIVE
ATMOSPHERIC PRESS: 757.7 MMHG
BASE EXCESS BLDA CALC-SCNC: 1.9 MMOL/L (ref 0–2)
BASOPHILS # BLD AUTO: 0.02 10*3/MM3 (ref 0–0.2)
BASOPHILS NFR BLD AUTO: 0.2 % (ref 0–1.5)
BDY SITE: ABNORMAL
BUN SERPL-MCNC: 25 MG/DL (ref 8–23)
BUN/CREAT SERPL: 24 (ref 7–25)
CALCIUM SPEC-SCNC: 8.3 MG/DL (ref 8.6–10.5)
CHLORIDE SERPL-SCNC: 114 MMOL/L (ref 98–107)
CO2 SERPL-SCNC: 25.7 MMOL/L (ref 22–29)
CREAT SERPL-MCNC: 1.04 MG/DL (ref 0.76–1.27)
DEPRECATED RDW RBC AUTO: 45.1 FL (ref 37–54)
EOSINOPHIL # BLD AUTO: 0.05 10*3/MM3 (ref 0–0.4)
EOSINOPHIL NFR BLD AUTO: 0.5 % (ref 0.3–6.2)
ERYTHROCYTE [DISTWIDTH] IN BLOOD BY AUTOMATED COUNT: 14.1 % (ref 12.3–15.4)
GFR SERPL CREATININE-BSD FRML MDRD: 72 ML/MIN/1.73
GLUCOSE BLDC GLUCOMTR-MCNC: 106 MG/DL (ref 70–130)
GLUCOSE BLDC GLUCOMTR-MCNC: 119 MG/DL (ref 70–130)
GLUCOSE BLDC GLUCOMTR-MCNC: 138 MG/DL (ref 70–130)
GLUCOSE BLDC GLUCOMTR-MCNC: 93 MG/DL (ref 70–130)
GLUCOSE SERPL-MCNC: 234 MG/DL (ref 65–99)
HCO3 BLDA-SCNC: 24.4 MMOL/L (ref 22–28)
HCT VFR BLD AUTO: 37.4 % (ref 37.5–51)
HGB BLD-MCNC: 12.1 G/DL (ref 13–17.7)
IMM GRANULOCYTES # BLD AUTO: 0.07 10*3/MM3 (ref 0–0.05)
IMM GRANULOCYTES NFR BLD AUTO: 0.6 % (ref 0–0.5)
INHALED O2 CONCENTRATION: 40 %
LYMPHOCYTES # BLD AUTO: 1 10*3/MM3 (ref 0.7–3.1)
LYMPHOCYTES NFR BLD AUTO: 9.2 % (ref 19.6–45.3)
MCH RBC QN AUTO: 28.3 PG (ref 26.6–33)
MCHC RBC AUTO-ENTMCNC: 32.4 G/DL (ref 31.5–35.7)
MCV RBC AUTO: 87.4 FL (ref 79–97)
MODALITY: ABNORMAL
MONOCYTES # BLD AUTO: 0.35 10*3/MM3 (ref 0.1–0.9)
MONOCYTES NFR BLD AUTO: 3.2 % (ref 5–12)
NEUTROPHILS NFR BLD AUTO: 86.3 % (ref 42.7–76)
NEUTROPHILS NFR BLD AUTO: 9.43 10*3/MM3 (ref 1.7–7)
NRBC BLD AUTO-RTO: 0 /100 WBC (ref 0–0.2)
O2 A-A PPRESDIFF RESPIRATORY: 0.4 MMHG
PCO2 BLDA: 30.1 MM HG (ref 35–45)
PEEP RESPIRATORY: 8 CM[H2O]
PH BLDA: 7.52 PH UNITS (ref 7.35–7.45)
PLATELET # BLD AUTO: 212 10*3/MM3 (ref 140–450)
PMV BLD AUTO: 9.4 FL (ref 6–12)
PO2 BLDA: 95.5 MM HG (ref 80–100)
POTASSIUM SERPL-SCNC: 3.2 MMOL/L (ref 3.5–5.2)
RBC # BLD AUTO: 4.28 10*6/MM3 (ref 4.14–5.8)
SAO2 % BLDCOA: 98.2 % (ref 92–99)
SET MECH RESP RATE: 26
SODIUM SERPL-SCNC: 148 MMOL/L (ref 136–145)
TOTAL RATE: 26 BREATHS/MINUTE
VENTILATOR MODE: ABNORMAL
VT ON VENT VENT: 450 ML
WBC # BLD AUTO: 10.92 10*3/MM3 (ref 3.4–10.8)

## 2020-11-28 PROCEDURE — 25010000002 CEFTRIAXONE PER 250 MG: Performed by: INTERNAL MEDICINE

## 2020-11-28 PROCEDURE — 71045 X-RAY EXAM CHEST 1 VIEW: CPT

## 2020-11-28 PROCEDURE — 85025 COMPLETE CBC W/AUTO DIFF WBC: CPT | Performed by: INTERNAL MEDICINE

## 2020-11-28 PROCEDURE — 82803 BLOOD GASES ANY COMBINATION: CPT

## 2020-11-28 PROCEDURE — 99232 SBSQ HOSP IP/OBS MODERATE 35: CPT | Performed by: INTERNAL MEDICINE

## 2020-11-28 PROCEDURE — 36600 WITHDRAWAL OF ARTERIAL BLOOD: CPT

## 2020-11-28 PROCEDURE — 25010000002 HEPARIN (PORCINE) PER 1000 UNITS: Performed by: INTERNAL MEDICINE

## 2020-11-28 PROCEDURE — 85730 THROMBOPLASTIN TIME PARTIAL: CPT | Performed by: INTERNAL MEDICINE

## 2020-11-28 PROCEDURE — 87070 CULTURE OTHR SPECIMN AEROBIC: CPT | Performed by: INTERNAL MEDICINE

## 2020-11-28 PROCEDURE — 80048 BASIC METABOLIC PNL TOTAL CA: CPT | Performed by: INTERNAL MEDICINE

## 2020-11-28 PROCEDURE — 94003 VENT MGMT INPAT SUBQ DAY: CPT

## 2020-11-28 PROCEDURE — 94799 UNLISTED PULMONARY SVC/PX: CPT

## 2020-11-28 PROCEDURE — 82962 GLUCOSE BLOOD TEST: CPT

## 2020-11-28 PROCEDURE — 87205 SMEAR GRAM STAIN: CPT | Performed by: INTERNAL MEDICINE

## 2020-11-28 RX ORDER — MEMANTINE HYDROCHLORIDE 5 MG/1
5 TABLET ORAL EVERY 12 HOURS SCHEDULED
Status: DISCONTINUED | OUTPATIENT
Start: 2020-11-28 | End: 2020-12-07 | Stop reason: HOSPADM

## 2020-11-28 RX ORDER — HYDROCODONE BITARTRATE AND ACETAMINOPHEN 10; 325 MG/1; MG/1
1 TABLET ORAL EVERY 6 HOURS PRN
Status: DISPENSED | OUTPATIENT
Start: 2020-11-28 | End: 2020-12-05

## 2020-11-28 RX ORDER — QUETIAPINE FUMARATE 50 MG/1
50 TABLET, EXTENDED RELEASE ORAL NIGHTLY
Status: DISCONTINUED | OUTPATIENT
Start: 2020-11-28 | End: 2020-12-07 | Stop reason: HOSPADM

## 2020-11-28 RX ORDER — FENTANYL CITRATE 50 UG/ML
25 INJECTION, SOLUTION INTRAMUSCULAR; INTRAVENOUS
Status: DISCONTINUED | OUTPATIENT
Start: 2020-11-28 | End: 2020-11-30

## 2020-11-28 RX ORDER — DONEPEZIL HYDROCHLORIDE 5 MG/1
5 TABLET, FILM COATED ORAL NIGHTLY
Status: DISCONTINUED | OUTPATIENT
Start: 2020-11-28 | End: 2020-12-07 | Stop reason: HOSPADM

## 2020-11-28 RX ADMIN — POTASSIUM CHLORIDE 40 MEQ: 1.5 POWDER, FOR SOLUTION ORAL at 16:49

## 2020-11-28 RX ADMIN — SODIUM CHLORIDE, PRESERVATIVE FREE 10 ML: 5 INJECTION INTRAVENOUS at 08:14

## 2020-11-28 RX ADMIN — METRONIDAZOLE 500 MG: 500 INJECTION, SOLUTION INTRAVENOUS at 16:06

## 2020-11-28 RX ADMIN — METRONIDAZOLE 500 MG: 500 INJECTION, SOLUTION INTRAVENOUS at 05:47

## 2020-11-28 RX ADMIN — DONEPEZIL HYDROCHLORIDE 5 MG: 5 TABLET, FILM COATED ORAL at 21:01

## 2020-11-28 RX ADMIN — CEFTRIAXONE SODIUM 1 G: 1 INJECTION, SOLUTION INTRAVENOUS at 16:06

## 2020-11-28 RX ADMIN — CHLORHEXIDINE GLUCONATE 15 ML: 1.2 RINSE ORAL at 21:02

## 2020-11-28 RX ADMIN — POTASSIUM CHLORIDE 40 MEQ: 1.5 POWDER, FOR SOLUTION ORAL at 23:50

## 2020-11-28 RX ADMIN — HEPARIN SODIUM 16 UNITS/KG/HR: 10000 INJECTION, SOLUTION INTRAVENOUS at 10:35

## 2020-11-28 RX ADMIN — MEMANTINE HYDROCHLORIDE 5 MG: 5 TABLET, FILM COATED ORAL at 21:01

## 2020-11-28 RX ADMIN — SODIUM CHLORIDE, PRESERVATIVE FREE 10 ML: 5 INJECTION INTRAVENOUS at 21:02

## 2020-11-28 RX ADMIN — PANTOPRAZOLE SODIUM 40 MG: 40 INJECTION, POWDER, FOR SOLUTION INTRAVENOUS at 08:13

## 2020-11-28 RX ADMIN — METRONIDAZOLE 500 MG: 500 INJECTION, SOLUTION INTRAVENOUS at 21:01

## 2020-11-28 RX ADMIN — DEXTROSE AND SODIUM CHLORIDE 100 ML/HR: 5; 450 INJECTION, SOLUTION INTRAVENOUS at 07:57

## 2020-11-28 RX ADMIN — QUETIAPINE FUMARATE 50 MG: 50 TABLET, EXTENDED RELEASE ORAL at 21:01

## 2020-11-28 RX ADMIN — CHLORHEXIDINE GLUCONATE 15 ML: 1.2 RINSE ORAL at 08:13

## 2020-11-29 ENCOUNTER — APPOINTMENT (OUTPATIENT)
Dept: GENERAL RADIOLOGY | Facility: HOSPITAL | Age: 65
End: 2020-11-29

## 2020-11-29 LAB
ALBUMIN SERPL-MCNC: 2.5 G/DL (ref 3.5–5.2)
ALBUMIN/GLOB SERPL: 1 G/DL
ALP SERPL-CCNC: 48 U/L (ref 39–117)
ALT SERPL W P-5'-P-CCNC: 8 U/L (ref 1–41)
ANION GAP SERPL CALCULATED.3IONS-SCNC: 5.3 MMOL/L (ref 5–15)
AST SERPL-CCNC: 10 U/L (ref 1–40)
BASOPHILS # BLD AUTO: 0.02 10*3/MM3 (ref 0–0.2)
BASOPHILS NFR BLD AUTO: 0.2 % (ref 0–1.5)
BILIRUB SERPL-MCNC: <0.2 MG/DL (ref 0–1.2)
BUN SERPL-MCNC: 24 MG/DL (ref 8–23)
BUN/CREAT SERPL: 30 (ref 7–25)
CALCIUM SPEC-SCNC: 8.5 MG/DL (ref 8.6–10.5)
CHLORIDE SERPL-SCNC: 121 MMOL/L (ref 98–107)
CO2 SERPL-SCNC: 23.7 MMOL/L (ref 22–29)
CREAT SERPL-MCNC: 0.8 MG/DL (ref 0.76–1.27)
DEPRECATED RDW RBC AUTO: 45.1 FL (ref 37–54)
EOSINOPHIL # BLD AUTO: 0.17 10*3/MM3 (ref 0–0.4)
EOSINOPHIL NFR BLD AUTO: 1.9 % (ref 0.3–6.2)
ERYTHROCYTE [DISTWIDTH] IN BLOOD BY AUTOMATED COUNT: 13.4 % (ref 12.3–15.4)
GFR SERPL CREATININE-BSD FRML MDRD: 98 ML/MIN/1.73
GLOBULIN UR ELPH-MCNC: 2.6 GM/DL
GLUCOSE BLDC GLUCOMTR-MCNC: 115 MG/DL (ref 70–130)
GLUCOSE BLDC GLUCOMTR-MCNC: 129 MG/DL (ref 70–130)
GLUCOSE BLDC GLUCOMTR-MCNC: 144 MG/DL (ref 70–130)
GLUCOSE BLDC GLUCOMTR-MCNC: 99 MG/DL (ref 70–130)
GLUCOSE SERPL-MCNC: 127 MG/DL (ref 65–99)
HCT VFR BLD AUTO: 35.3 % (ref 37.5–51)
HGB BLD-MCNC: 11.1 G/DL (ref 13–17.7)
IMM GRANULOCYTES # BLD AUTO: 0.06 10*3/MM3 (ref 0–0.05)
IMM GRANULOCYTES NFR BLD AUTO: 0.7 % (ref 0–0.5)
LYMPHOCYTES # BLD AUTO: 0.65 10*3/MM3 (ref 0.7–3.1)
LYMPHOCYTES NFR BLD AUTO: 7.1 % (ref 19.6–45.3)
MCH RBC QN AUTO: 28.8 PG (ref 26.6–33)
MCHC RBC AUTO-ENTMCNC: 31.4 G/DL (ref 31.5–35.7)
MCV RBC AUTO: 91.5 FL (ref 79–97)
MONOCYTES # BLD AUTO: 0.29 10*3/MM3 (ref 0.1–0.9)
MONOCYTES NFR BLD AUTO: 3.2 % (ref 5–12)
NEUTROPHILS NFR BLD AUTO: 7.93 10*3/MM3 (ref 1.7–7)
NEUTROPHILS NFR BLD AUTO: 86.9 % (ref 42.7–76)
NRBC BLD AUTO-RTO: 0 /100 WBC (ref 0–0.2)
PLATELET # BLD AUTO: 236 10*3/MM3 (ref 140–450)
PMV BLD AUTO: 9.9 FL (ref 6–12)
POTASSIUM SERPL-SCNC: 4.3 MMOL/L (ref 3.5–5.2)
PROT SERPL-MCNC: 5.1 G/DL (ref 6–8.5)
RBC # BLD AUTO: 3.86 10*6/MM3 (ref 4.14–5.8)
SODIUM SERPL-SCNC: 150 MMOL/L (ref 136–145)
VANCOMYCIN TROUGH SERPL-MCNC: <4 MCG/ML (ref 5–20)
WBC # BLD AUTO: 9.12 10*3/MM3 (ref 3.4–10.8)

## 2020-11-29 PROCEDURE — 80053 COMPREHEN METABOLIC PANEL: CPT | Performed by: INTERNAL MEDICINE

## 2020-11-29 PROCEDURE — 82962 GLUCOSE BLOOD TEST: CPT

## 2020-11-29 PROCEDURE — 74018 RADEX ABDOMEN 1 VIEW: CPT

## 2020-11-29 PROCEDURE — 80202 ASSAY OF VANCOMYCIN: CPT | Performed by: INTERNAL MEDICINE

## 2020-11-29 PROCEDURE — 85025 COMPLETE CBC W/AUTO DIFF WBC: CPT | Performed by: INTERNAL MEDICINE

## 2020-11-29 PROCEDURE — 25010000002 CEFTRIAXONE PER 250 MG: Performed by: INTERNAL MEDICINE

## 2020-11-29 PROCEDURE — 94799 UNLISTED PULMONARY SVC/PX: CPT

## 2020-11-29 PROCEDURE — 36415 COLL VENOUS BLD VENIPUNCTURE: CPT | Performed by: INTERNAL MEDICINE

## 2020-11-29 PROCEDURE — 94003 VENT MGMT INPAT SUBQ DAY: CPT

## 2020-11-29 RX ADMIN — QUETIAPINE FUMARATE 50 MG: 50 TABLET, EXTENDED RELEASE ORAL at 20:04

## 2020-11-29 RX ADMIN — CHLORHEXIDINE GLUCONATE 15 ML: 1.2 RINSE ORAL at 08:22

## 2020-11-29 RX ADMIN — SODIUM CHLORIDE, PRESERVATIVE FREE 10 ML: 5 INJECTION INTRAVENOUS at 08:23

## 2020-11-29 RX ADMIN — METRONIDAZOLE 500 MG: 500 INJECTION, SOLUTION INTRAVENOUS at 04:46

## 2020-11-29 RX ADMIN — METRONIDAZOLE 500 MG: 500 INJECTION, SOLUTION INTRAVENOUS at 20:45

## 2020-11-29 RX ADMIN — CEFTRIAXONE SODIUM 1 G: 1 INJECTION, SOLUTION INTRAVENOUS at 13:51

## 2020-11-29 RX ADMIN — SODIUM CHLORIDE, PRESERVATIVE FREE 10 ML: 5 INJECTION INTRAVENOUS at 20:04

## 2020-11-29 RX ADMIN — PANTOPRAZOLE SODIUM 40 MG: 40 INJECTION, POWDER, FOR SOLUTION INTRAVENOUS at 08:22

## 2020-11-29 RX ADMIN — DONEPEZIL HYDROCHLORIDE 5 MG: 5 TABLET, FILM COATED ORAL at 20:04

## 2020-11-29 RX ADMIN — MEMANTINE HYDROCHLORIDE 5 MG: 5 TABLET, FILM COATED ORAL at 20:04

## 2020-11-29 RX ADMIN — CHLORHEXIDINE GLUCONATE 15 ML: 1.2 RINSE ORAL at 20:03

## 2020-11-29 RX ADMIN — METRONIDAZOLE 500 MG: 500 INJECTION, SOLUTION INTRAVENOUS at 13:50

## 2020-11-29 RX ADMIN — POTASSIUM CHLORIDE 40 MEQ: 1.5 POWDER, FOR SOLUTION ORAL at 08:22

## 2020-11-29 RX ADMIN — MEMANTINE HYDROCHLORIDE 5 MG: 5 TABLET, FILM COATED ORAL at 08:21

## 2020-11-30 LAB
ANION GAP SERPL CALCULATED.3IONS-SCNC: 5.3 MMOL/L (ref 5–15)
BACTERIA SPEC RESP CULT: NORMAL
BUN SERPL-MCNC: 23 MG/DL (ref 8–23)
BUN/CREAT SERPL: 31.1 (ref 7–25)
CALCIUM SPEC-SCNC: 8.5 MG/DL (ref 8.6–10.5)
CHLORIDE SERPL-SCNC: 117 MMOL/L (ref 98–107)
CO2 SERPL-SCNC: 24.7 MMOL/L (ref 22–29)
CREAT SERPL-MCNC: 0.74 MG/DL (ref 0.76–1.27)
GFR SERPL CREATININE-BSD FRML MDRD: 107 ML/MIN/1.73
GLUCOSE BLDC GLUCOMTR-MCNC: 104 MG/DL (ref 70–130)
GLUCOSE BLDC GLUCOMTR-MCNC: 108 MG/DL (ref 70–130)
GLUCOSE BLDC GLUCOMTR-MCNC: 116 MG/DL (ref 70–130)
GLUCOSE BLDC GLUCOMTR-MCNC: 124 MG/DL (ref 70–130)
GLUCOSE SERPL-MCNC: 119 MG/DL (ref 65–99)
GRAM STN SPEC: NORMAL
POTASSIUM SERPL-SCNC: 3.9 MMOL/L (ref 3.5–5.2)
SODIUM SERPL-SCNC: 147 MMOL/L (ref 136–145)

## 2020-11-30 PROCEDURE — 82962 GLUCOSE BLOOD TEST: CPT

## 2020-11-30 PROCEDURE — 97161 PT EVAL LOW COMPLEX 20 MIN: CPT

## 2020-11-30 PROCEDURE — 80048 BASIC METABOLIC PNL TOTAL CA: CPT | Performed by: INTERNAL MEDICINE

## 2020-11-30 PROCEDURE — 25010000002 CEFTRIAXONE PER 250 MG: Performed by: INTERNAL MEDICINE

## 2020-11-30 RX ORDER — LANSOPRAZOLE
30 KIT EVERY MORNING
Status: DISCONTINUED | OUTPATIENT
Start: 2020-12-01 | End: 2020-12-07 | Stop reason: HOSPADM

## 2020-11-30 RX ADMIN — CEFTRIAXONE SODIUM 1 G: 1 INJECTION, SOLUTION INTRAVENOUS at 12:45

## 2020-11-30 RX ADMIN — PANTOPRAZOLE SODIUM 40 MG: 40 INJECTION, POWDER, FOR SOLUTION INTRAVENOUS at 08:32

## 2020-11-30 RX ADMIN — METRONIDAZOLE 500 MG: 500 INJECTION, SOLUTION INTRAVENOUS at 12:46

## 2020-11-30 RX ADMIN — SODIUM CHLORIDE, PRESERVATIVE FREE 10 ML: 5 INJECTION INTRAVENOUS at 08:33

## 2020-11-30 RX ADMIN — CHLORHEXIDINE GLUCONATE 15 ML: 1.2 RINSE ORAL at 08:32

## 2020-11-30 RX ADMIN — MEMANTINE HYDROCHLORIDE 5 MG: 5 TABLET, FILM COATED ORAL at 20:08

## 2020-11-30 RX ADMIN — QUETIAPINE FUMARATE 50 MG: 50 TABLET, EXTENDED RELEASE ORAL at 20:08

## 2020-11-30 RX ADMIN — MEMANTINE HYDROCHLORIDE 5 MG: 5 TABLET, FILM COATED ORAL at 08:32

## 2020-11-30 RX ADMIN — METRONIDAZOLE 500 MG: 500 INJECTION, SOLUTION INTRAVENOUS at 04:45

## 2020-11-30 RX ADMIN — DONEPEZIL HYDROCHLORIDE 5 MG: 5 TABLET, FILM COATED ORAL at 20:08

## 2020-11-30 RX ADMIN — METRONIDAZOLE 500 MG: 500 INJECTION, SOLUTION INTRAVENOUS at 20:45

## 2020-11-30 RX ADMIN — SODIUM CHLORIDE, PRESERVATIVE FREE 10 ML: 5 INJECTION INTRAVENOUS at 20:49

## 2020-12-01 LAB
ANION GAP SERPL CALCULATED.3IONS-SCNC: 6.3 MMOL/L (ref 5–15)
BACTERIA SPEC AEROBE CULT: NORMAL
BACTERIA SPEC AEROBE CULT: NORMAL
BUN SERPL-MCNC: 24 MG/DL (ref 8–23)
BUN/CREAT SERPL: 30 (ref 7–25)
CALCIUM SPEC-SCNC: 8.7 MG/DL (ref 8.6–10.5)
CHLORIDE SERPL-SCNC: 112 MMOL/L (ref 98–107)
CO2 SERPL-SCNC: 26.7 MMOL/L (ref 22–29)
CREAT SERPL-MCNC: 0.8 MG/DL (ref 0.76–1.27)
GFR SERPL CREATININE-BSD FRML MDRD: 98 ML/MIN/1.73
GLUCOSE BLDC GLUCOMTR-MCNC: 105 MG/DL (ref 70–130)
GLUCOSE BLDC GLUCOMTR-MCNC: 119 MG/DL (ref 70–130)
GLUCOSE BLDC GLUCOMTR-MCNC: 141 MG/DL (ref 70–130)
GLUCOSE BLDC GLUCOMTR-MCNC: 82 MG/DL (ref 70–130)
GLUCOSE SERPL-MCNC: 144 MG/DL (ref 65–99)
POTASSIUM SERPL-SCNC: 4.4 MMOL/L (ref 3.5–5.2)
SODIUM SERPL-SCNC: 145 MMOL/L (ref 136–145)

## 2020-12-01 PROCEDURE — 25010000002 CEFTRIAXONE PER 250 MG: Performed by: INTERNAL MEDICINE

## 2020-12-01 PROCEDURE — 80048 BASIC METABOLIC PNL TOTAL CA: CPT | Performed by: INTERNAL MEDICINE

## 2020-12-01 PROCEDURE — 92610 EVALUATE SWALLOWING FUNCTION: CPT

## 2020-12-01 PROCEDURE — 82962 GLUCOSE BLOOD TEST: CPT

## 2020-12-01 RX ADMIN — METRONIDAZOLE 500 MG: 500 INJECTION, SOLUTION INTRAVENOUS at 05:32

## 2020-12-01 RX ADMIN — LANSOPRAZOLE 30 MG: KIT at 08:20

## 2020-12-01 RX ADMIN — QUETIAPINE FUMARATE 50 MG: 50 TABLET, EXTENDED RELEASE ORAL at 20:52

## 2020-12-01 RX ADMIN — CEFTRIAXONE SODIUM 1 G: 1 INJECTION, SOLUTION INTRAVENOUS at 13:38

## 2020-12-01 RX ADMIN — HYDROCODONE BITARTRATE AND ACETAMINOPHEN 1 TABLET: 10; 325 TABLET ORAL at 05:32

## 2020-12-01 RX ADMIN — DONEPEZIL HYDROCHLORIDE 5 MG: 5 TABLET, FILM COATED ORAL at 20:53

## 2020-12-01 RX ADMIN — MEMANTINE HYDROCHLORIDE 5 MG: 5 TABLET, FILM COATED ORAL at 08:39

## 2020-12-01 RX ADMIN — SODIUM CHLORIDE, PRESERVATIVE FREE 10 ML: 5 INJECTION INTRAVENOUS at 08:20

## 2020-12-01 RX ADMIN — METRONIDAZOLE 500 MG: 500 INJECTION, SOLUTION INTRAVENOUS at 14:19

## 2020-12-01 RX ADMIN — MEMANTINE HYDROCHLORIDE 5 MG: 5 TABLET, FILM COATED ORAL at 20:53

## 2020-12-01 RX ADMIN — METRONIDAZOLE 500 MG: 500 INJECTION, SOLUTION INTRAVENOUS at 20:53

## 2020-12-01 RX ADMIN — SODIUM CHLORIDE, PRESERVATIVE FREE 10 ML: 5 INJECTION INTRAVENOUS at 20:53

## 2020-12-01 NOTE — PLAN OF CARE
Goal Outcome Evaluation:  Plan of Care Reviewed With: patient  Progress: declining  Outcome Summary: Patient seen for clinical swallow assessment d/t concern for need for PEG. Pt was recently admitted and D/Lon from [facility] with a dx of asp pna. The patient was on a soft/bite size and thin diet prior to that hospitalization, but speech therapy evaluated the patient during his stay and recommended puree and nectar (11/21/20). Pt was discharged to a skilled rehab unit. At [facility], the patient developed severe resp distress during a meal. SLP attempted to contact [facility] for patient's diet information during that event, but SLP was transferred to a voicemail this date. Pt unable to follow directions to complete oral [facility] exam. Pt with wet voice when SLP arrived, but cleared after voicing x2. Pt unable to trigger a volitional swallow. Pt opened his mouth to accept ice via spoon. Pt closed his mouth around the spoon, but made no attempts to prepare bolus. Pt eventually triggered a swallow with verbal and tactile cues by SLP. Wet voice appreciated. SLP recs NPO. Pt's baseline swallow function appears greatly impaired per previous evaluation from SLP at [facility]. Pt is at a high risk for aspiration at this time.

## 2020-12-01 NOTE — PROGRESS NOTES
[facility]      Mar/chart reviewed  Follow up Acute hypoxemic respiratory failure  Extubated 11/29  Doesn't provide much subjective  Required some suctioning yesterday, not a great deal    Vital Sign Min/Max for last 24 hours  Temp  Min: 97.5 °F (36.4 °C)  Max: 99.6 °F (37.6 °C)   BP  Min: 108/69  Max: 159/93   Pulse  Min: 79  Max: 105   No data recorded   SpO2  Min: 91 %  Max: 100 %   Flow (L/min)  Min: 2  Max: 2   No data recorded   2114/900    Appears ill,  arouses, oriented times 2  Right pupil is constricted and doesn't appear to move normally, or react normally, normal sclera, no palpable thyroid nodules,  mmm, no jvd, trachea midline, neck supple,  chest decreased ae, mildly coarse bilaterally, no crackles, no wheezes,   rrr,   soft, nt, nd +bs,  no c/c/ e  Skin warm, dry no rashes  Le contractures.     Labs: 12/1: reviewed:  Pending    A/P:  1. Acute hypoxemic respiratory failure --- doing ok, s/p extubation.   2. Aspiration pneumonia -- continue current antibiotic,  needs swallow eval, suspect he will need feeding tube, we will see  3. Dementia/cognitive defect -- resumed home meds  4. NESTEMI -- was treated with heparin briefly, echo pretty normal so heparin d/c'ed.   5. Hypernatremia -- improving,   6. dysphgia  7. Immobility syndrome    Awaiting swallow eval, was already on modified diet at baseline, suspect he will need a more permanent feeding tube.  Discussed with fely yesterday.  Awaiting bed outside of unit.

## 2020-12-01 NOTE — PROGRESS NOTES
Adult Nutrition  Assessment/PES    Patient Name:  Arian Persaud  YOB: 1955  MRN: [id number]  Admit Date:  11/26/2020    Assessment Date:  12/1/2020    Comments:  Nutrition follow up. Tolerating TF formula change and increase in rate. BM x 3 yesterday. Swallow eval is pending. MD concerned pt will probably need a PEG. He is a wiley of the [facility].  Na+ is normal today at 145.  Glucoses noted as well.  Will cont to follow clinical course, nutrition support needs.  Discussed with RN.     Reason for Assessment     Row Name 12/01/20 1124          Reason for Assessment    Reason For Assessment  follow-up protocol;TF/PN         Nutrition/Diet History     Row Name 12/01/20 1124          Nutrition/Diet History    Typical Food/Fluid Intake  will likely need a PEG per MD. Pt is wiley of the [facility]     Factors Affecting Nutritional Intake  impaired cognitive status/motor control;difficulty/impaired swallowing           Labs/Tests/Procedures/Meds     Row Name 12/01/20 1125          Labs/Procedures/Meds    Lab Results Reviewed  reviewed, pertinent     Lab Results Comments  Glu        Diagnostic Tests/Procedures    Diagnostic Test/Procedure Reviewed  reviewed, pertinent     Diagnostic Test/Procedures Comments  SLP eval pending        Medications    Pertinent Medications Reviewed  reviewed, pertinent     Pertinent Medications Comments  rocephin, insulin, ppi, flagyl, seroquel         Physical Findings     Row Name 12/01/20 1126          Physical Findings    Overall Physical Appearance  underweight;on oxygen therapy     Gastrointestinal  feeding tube     Tubes  nasogastric tube     Skin  other (see comments);pressure injury B=13, PI to L foot           Nutrition Prescription Ordered     Row Name 12/01/20 1126          Nutrition Prescription PO    Current PO Diet  NPO        Nutrition Prescription EN    Enteral Route  NG     Product  Fibersource HN (Jevity 1.2)     TF Delivery Method  Continuous     Continuous TF Goal  Rate (mL/hr)  60 mL/hr     Continuous TF Current Rate (mL/hr)  60 mL/hr     Water flush (mL)   25 mL     Water Flush Frequency  Per hour         Evaluation of Received Nutrient/Fluid Intake     Row Name 12/01/20 1127          Calories Evaluation    Enteral Calories (kcal)  1728     % of Kcal Needs  105        Protein Evaluation    Enteral Protein (gm)  77.8     % of Protein Needs  111        Intake Assessment    Energy/Calorie Requirement Assessment  meeting needs     Protein Requirement Assessment  meeting needs     Fluid Requirement Assessment  meeting needs     Tolerance  tolerating        Fluid Intake Evaluation    Enteral (Free Water) Fluid (mL)  1166.4     Free Water Flush Fluid (mL)  600     Total Free Water Intake (mL)  1766.4 mL        EN Evaluation    TF Changes  Increase free water;Rate increased     TF Residual  0     TF Tolerance  Other (comment) BM x 3 yesterday     HOB  Greater than or equal to 30 degress         Problem/Interventions:    Intervention Goal     Row Name 12/01/20 1129          Intervention Goal    General  Maintain nutrition;Nutrition support treatment;Disease management/therapy;Reduce/improve symptoms;Improved nutrition related lab(s);Meet nutritional needs for age/condition     TF/PN  Tolerate TF at goal;Maintain TF/PN     Weight  Appropriate weight gain         Nutrition Intervention     Row Name 12/01/20 1129          Nutrition Intervention    RD/Tech Action  Follow Tx progress;Care plan reviewd         Nutrition Prescription     Row Name 12/01/20 1129          Nutrition Prescription EN    Enteral Prescription  Continue same protocol         Education/Evaluation     Row Name 12/01/20 1129          Education    Education  Will Instruct as appropriate        Monitor/Evaluation    Monitor  Per protocol;Skin status;Symptoms;I&O;Pertinent labs;TF delivery/tolerance;Weight           Electronically signed by:  Muna Baptiste RD  12/01/20 11:30 EST

## 2020-12-01 NOTE — PLAN OF CARE
Goal Outcome Evaluation:  Plan of Care Reviewed With: patient  Progress: no change  Outcome Summary: Pt remians in ICU as overflow. Pt started to drop sats at 0500, Pt upper airway suctioned x3 from 0500 to 0700. However, supplemental O2 increased from 2L to 6L to maintain sat >90%. Other VSS. UOP adeqaute at 500. PRN pain medication x1. Breath sounds much more coarse than beginning of the shift. WCTM

## 2020-12-01 NOTE — THERAPY EVALUATION
Acute Care - Speech Language Pathology   Swallow Initial Evaluation [facility]     Patient Name: Arian Persaud  : 1955  MRN: [id number]  Today's Date: 2020               Admit Date: 2020    Visit Dx:     ICD-10-CM ICD-9-CM   1. Acute respiratory failure with hypoxia (CMS/HCC)  J96.01 518.81   2. Aspiration pneumonia, unspecified aspiration pneumonia type, unspecified laterality, unspecified part of lung (CMS/HCC)  J69.0 507.0   3. Altered mental status, unspecified altered mental status type  R41.82 780.97   4. Elevated troponin  R77.8 790.6   5. Hypernatremia  E87.0 276.0   6. Dementia with behavioral disturbance, unspecified dementia type (CMS/HCC)  F03.91 294.21   7. Dysphagia, unspecified type  R13.10 787.20     Patient Active Problem List   Diagnosis   • Sepsis (CMS/HCC)   • Lactic acidosis   • Mental retardation   • Essential hypertension   • GERD (gastroesophageal reflux disease)   • Elevated troponin   • Dementia (CMS/HCC)   • Moderate malnutrition (CMS/HCC)   • Acute respiratory failure with hypoxia (CMS/HCC)     Past Medical History:   Diagnosis Date   • Coronary artery disease    • Injury of back    • Kidney stones    • Mental retardation      Past Surgical History:   Procedure Laterality Date   • KIDNEY SURGERY     • TONSILLECTOMY          SWALLOW EVALUATION (last 72 hours)      SLP Adult Swallow Evaluation     Row Name 20 1300                   Rehab Evaluation    Document Type  evaluation  -SH        Patient Effort  fair  -SH           General Information    Patient Profile Reviewed  yes  -SH        Pertinent History Of Current Problem  Intubated -. Talking with staff during breakfast and developed resp distress. BSE 20 [place] with recs for puree and nectar. No video swallow completed that admit.    -SH        Current Method of Nutrition  NPO  -SH        Precautions/Limitations, Vision  WFL;for purposes of eval  -SH        Precautions/Limitations, Hearing   WFL;for purposes of eval  -        Prior Level of Function-Communication  cognitive-linguistic impairment  -        Prior Level of Function-Swallowing  puree;nectar thick liquids  -        Plans/Goals Discussed with  patient  -        Barriers to Rehab  cognitive status  -           Oral Motor Structure and Function    Volitional Swallow  unable to elicit  -        Volitional Cough  unable to elicit  -           Oral Musculature and Cranial Nerve Assessment    Oral Motor General Assessment  unable to assess  -        Oral Motor, Comment  Pt unable to follow directions to complete oral mec exam  -           Clinical Swallow Eval    Clinical Swallow Evaluation Summary  Patient seen for clinical swallow assessment d/t concern for need for PEG. Pt was recently admitted and D/Lon from [facility] with a dx of asp pna. The patient was on a soft/bite size and thin diet prior to that hospitalization, but speech therapy evaluated the patient during his stay and recommended puree and nectar (11/21/20). Pt was discharged to a skilled rehab unit. At [facility], the patient developed severe resp distress during a meal. SLP attempted to contact [facility] for patient's diet information during that event, but SLP was transferred to a voicemail this date. Pt unable to follow directions to complete oral [facility] exam. Pt with wet voice when SLP arrived, but cleared after voicing x2. Pt unable to trigger a volitional swallow. Pt opened his mouth to accept ice via spoon. Pt closed his mouth around the spoon, but made no attempts to prepare bolus. Pt eventually triggered a swallow with verbal and tactile cues by SLP. Wet voice appreciated. SLP recs NPO. Pt's baseline swallow function appears greatly impaired per previous evaluation from SLP at [place]. Pt is at a high risk for aspiration at this time.   -SH           Clinical Impression    SLP Swallowing Diagnosis  oral dysphagia;suspected  pharyngeal dysphagia  -        Functional Impact  risk of aspiration/pneumonia  -        Rehab Potential/Prognosis, Swallowing  good, to achieve stated therapy goals  -        Swallow Criteria for Skilled Therapeutic Interventions Met  demonstrates skilled criteria  -           Recommendations    Therapy Frequency (Swallow)  PRN  -        Predicted Duration Therapy Intervention (Days)  until discharge  -        SLP Diet Recommendation  NPO  -        Recommended Diagnostics  reassess via clinical swallow evaluation  -        Oral Care Recommendations  Oral Care BID/PRN  -        SLP Rec. for Method of Medication Administration  meds via alternate route  -        Monitor for Signs of Aspiration  yes;notify SLP if any concerns  -        Anticipated Discharge Disposition (SLP)  anticipate therapy at next level of care  -           Swallow Goals (SLP)    Oral Nutrition/Hydration Goal Selection (SLP)  oral nutrition/hydration, SLP goal 1  -           Oral Nutrition/Hydration Goal 1 (SLP)    Oral Nutrition/Hydration Goal 1, SLP  Pt will jefferson PO without overt s/s of asp.  -        Time Frame (Oral Nutrition/Hydration Goal 1, SLP)  by discharge  -          User Key  (r) = Recorded By, (t) = Taken By, (c) = Cosigned By    Initials Name Effective Dates     Domi Winters MS CCC-SLP 03/07/18 -           EDUCATION  The patient has been educated in the following areas:   Dysphagia (Swallowing Impairment).    SLP Recommendation and Plan  SLP Swallowing Diagnosis: oral dysphagia, suspected pharyngeal dysphagia  SLP Diet Recommendation: NPO     SLP Rec. for Method of Medication Administration: meds via alternate route     Monitor for Signs of Aspiration: yes, notify SLP if any concerns  Recommended Diagnostics: reassess via clinical swallow evaluation  Swallow Criteria for Skilled Therapeutic Interventions Met: demonstrates skilled criteria  Anticipated Discharge Disposition (SLP): anticipate therapy  at next level of care  Rehab Potential/Prognosis, Swallowing: good, to achieve stated therapy goals  Therapy Frequency (Swallow): PRN  Predicted Duration Therapy Intervention (Days): until discharge                         Plan of Care Reviewed With: patient  Progress: declining  Outcome Summary: Patient seen for clinical swallow assessment d/t concern for need for PEG. Pt was recently admitted and D/C from [facility] with a dx of asp pna. The patient was on a soft/bite size and thin diet prior to that hospitalization, but speech therapy evaluated the patient during his stay and recommended puree and nectar. Pt was discharged to a skilled rehab unit. At [facility]    SLP GOALS     Row Name 12/01/20 1300             Oral Nutrition/Hydration Goal 1 (SLP)    Oral Nutrition/Hydration Goal 1, SLP  Pt will jefferson PO without overt s/s of asp.  -      Time Frame (Oral Nutrition/Hydration Goal 1, SLP)  by discharge  -        User Key  (r) = Recorded By, (t) = Taken By, (c) = Cosigned By    Initials Name Provider Type    Domi Neal MS CCC-SLP Speech and Language Pathologist           SLP Outcome Measures (last 72 hours)      SLP Outcome Measures     Row Name 12/01/20 1400             SLP Outcome Measures    Outcome Measure Used?  Adult NOMS  -         Adult FCM Scores    FCM Chosen  Swallowing  -      Swallowing FCM Score  1  -        User Key  (r) = Recorded By, (t) = Taken By, (c) = Cosigned By    Initials Name Effective Dates    Domi Neal MS CCC-SLP 03/07/18 -            Time Calculation:   Time Calculation- SLP     Row Name 12/01/20 1443             Time Calculation- [facility]    SLP Start Time  1300  -      SLP Received On  12/01/20  -        User Key  (r) = Recorded By, (t) = Taken By, (c) = Cosigned By    Initials Name Provider Type    Domi Neal MS CCC-SLP Speech and Language Pathologist          Therapy Charges for Today     Code Description Service Date Service Provider  Modifiers Qty    [id number] [facility] EVAL ORAL PHARYNG SWALLOW 4 12/1/2020 Domi Winters, MS CCC-SLP GN 1               Domi Winters MS CCC-SLP  12/1/2020

## 2020-12-02 LAB
ANION GAP SERPL CALCULATED.3IONS-SCNC: 5.1 MMOL/L (ref 5–15)
BUN SERPL-MCNC: 22 MG/DL (ref 8–23)
BUN/CREAT SERPL: 30.6 (ref 7–25)
CALCIUM SPEC-SCNC: 8.6 MG/DL (ref 8.6–10.5)
CHLORIDE SERPL-SCNC: 106 MMOL/L (ref 98–107)
CO2 SERPL-SCNC: 28.9 MMOL/L (ref 22–29)
CREAT SERPL-MCNC: 0.72 MG/DL (ref 0.76–1.27)
GFR SERPL CREATININE-BSD FRML MDRD: 111 ML/MIN/1.73
GLUCOSE BLDC GLUCOMTR-MCNC: 108 MG/DL (ref 70–130)
GLUCOSE BLDC GLUCOMTR-MCNC: 114 MG/DL (ref 70–130)
GLUCOSE BLDC GLUCOMTR-MCNC: 124 MG/DL (ref 70–130)
GLUCOSE BLDC GLUCOMTR-MCNC: 127 MG/DL (ref 70–130)
GLUCOSE BLDC GLUCOMTR-MCNC: 130 MG/DL (ref 70–130)
GLUCOSE BLDC GLUCOMTR-MCNC: 147 MG/DL (ref 70–130)
GLUCOSE SERPL-MCNC: 147 MG/DL (ref 65–99)
POTASSIUM SERPL-SCNC: 4.2 MMOL/L (ref 3.5–5.2)
SODIUM SERPL-SCNC: 140 MMOL/L (ref 136–145)

## 2020-12-02 PROCEDURE — 80048 BASIC METABOLIC PNL TOTAL CA: CPT | Performed by: INTERNAL MEDICINE

## 2020-12-02 PROCEDURE — 82962 GLUCOSE BLOOD TEST: CPT

## 2020-12-02 PROCEDURE — 99221 1ST HOSP IP/OBS SF/LOW 40: CPT | Performed by: SURGERY

## 2020-12-02 PROCEDURE — 25010000002 CEFTRIAXONE PER 250 MG: Performed by: INTERNAL MEDICINE

## 2020-12-02 RX ORDER — CEFAZOLIN SODIUM 2 G/100ML
2 INJECTION, SOLUTION INTRAVENOUS ONCE
Status: CANCELLED | OUTPATIENT
Start: 2020-12-03 | End: 2020-12-02

## 2020-12-02 RX ADMIN — METRONIDAZOLE 500 MG: 500 INJECTION, SOLUTION INTRAVENOUS at 21:54

## 2020-12-02 RX ADMIN — MEMANTINE HYDROCHLORIDE 5 MG: 5 TABLET, FILM COATED ORAL at 21:54

## 2020-12-02 RX ADMIN — LANSOPRAZOLE 30 MG: KIT at 06:16

## 2020-12-02 RX ADMIN — METRONIDAZOLE 500 MG: 500 INJECTION, SOLUTION INTRAVENOUS at 05:31

## 2020-12-02 RX ADMIN — SODIUM CHLORIDE, PRESERVATIVE FREE 10 ML: 5 INJECTION INTRAVENOUS at 08:49

## 2020-12-02 RX ADMIN — METRONIDAZOLE 500 MG: 500 INJECTION, SOLUTION INTRAVENOUS at 16:30

## 2020-12-02 RX ADMIN — CEFTRIAXONE SODIUM 1 G: 1 INJECTION, SOLUTION INTRAVENOUS at 14:56

## 2020-12-02 RX ADMIN — DONEPEZIL HYDROCHLORIDE 5 MG: 5 TABLET, FILM COATED ORAL at 21:54

## 2020-12-02 RX ADMIN — QUETIAPINE FUMARATE 50 MG: 50 TABLET, EXTENDED RELEASE ORAL at 21:54

## 2020-12-02 RX ADMIN — SODIUM CHLORIDE, PRESERVATIVE FREE 10 ML: 5 INJECTION INTRAVENOUS at 21:54

## 2020-12-02 NOTE — PROGRESS NOTES
Continued Stay Note  [facility]     Patient Name: Arian Persaud  MRN: [id number]  Today's Date: 12/2/2020    Admit Date: 11/26/2020    Discharge Plan     Row Name 12/02/20 1610       Plan    Plan  Return to [facility]    Plan Comments  [facility] faxed the PEG request forms along with supporting documentation to [facility] nurse consultants.  Spoke with Joan Elder RN to verify receipt.  She will review asap and alert the guardian and staff of the decision.  RN will then need to contact the guardian for final consent.  Await review.        Discharge Codes    No documentation.             Savita Gordon RN     No answer at home #. Will try them later after they return home from [place].

## 2020-12-02 NOTE — PLAN OF CARE
Goal Outcome Evaluation:  Plan of Care Reviewed With: patient  Progress: declining   Remain in icu as overflow, no changes, tube feeds remain at 60ml/hr goal, VSS

## 2020-12-02 NOTE — PROGRESS NOTES
[facility]      Mar/chart reviewed  Follow up Acute hypoxemic respiratory failure  Extubated 11/29  Doesn't provide much subjective    Vital Sign Min/Max for last 24 hours  Temp  Min: 97.4 °F (36.3 °C)  Max: 97.7 °F (36.5 °C)   BP  Min: 107/74  Max: 158/89   Pulse  Min: 80  Max: 109   Resp  Min: 18  Max: 20   SpO2  Min: 90 %  Max: 98 %   Flow (L/min)  Min: 5  Max: 5   Weight  Min: 49.3 kg (108 lb 11 oz)  Max: 49.3 kg (108 lb 11 oz)   2116/1000    Appears ill,  arouses, sleepy  Right pupil is constricted and doesn't appear to move normally, or react normally, normal sclera, no palpable thyroid nodules,  mmm, no jvd, trachea midline, neck supple,  chest decreased ae, mildly coarse bilaterally, no crackles, no wheezes,   rrr,   soft, nt, nd +bs,  no c/c/ e  Skin warm, dry no rashes  Le contractures.     Labs: 12/2: reviewed:  Na 140  Bicarb 28.9  Cr 0.72    A/P:  1. Acute hypoxemic respiratory failure --- doing ok, s/p extubation.   2. Aspiration pneumonia -- continue current antibiotic,  needs swallow eval, suspect he will need feeding tube, we will see  3. Dementia/cognitive defect -- resumed home meds  4. NESTEMI -- was treated with heparin briefly, echo pretty normal so heparin d/c'ed.   5. Hypernatremia -- improving,   6. dysphgia  7. Immobility syndrome    Will need surgical feeding tube vs. Palliative care  Await decision from decision makers

## 2020-12-02 NOTE — CONSULTS
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  He was on the ventilator for a while but has been extubated and made it out of the ICU.  He is currently being fed through a nasal tube and failed a swallow study.  He has significant malnutrition with a body mass index of only 16.  We are asked to see him for consultation for possible percutaneous feeding access.    Past Medical History: Dementia, mental retardation, coronary artery disease, kidney stones    Past Surgical History: Renal stone procedure, unclear exactly what, tonsillectomy, presumably has had cholecystectomy as it is not visualized on CT of around 2 years ago    Medications: Reviewed and reconciled in epic.  He is on ceftriaxone for his pneumonia.  No anticoagulants are noted currently.    Allergies: Keflex causes a mild rash, he has been tolerating other drugs in this class    Social History: He is a wiley of the [employer], non-smoker    Family History: Noncontributory    Review of Systems:   The patient's mental retardation and dementia prevents any meaningful review of systems    Physical Exam:   Body mass index 16.0  Temperature 97.7 heart rate 91 blood pressure 112/71  General: Awake and alert, does not answer questions, no acute distress  Neck: Supple without lymphadenopathy or thyromegaly, trachea is in the midline  Respiratory: Lungs are clear bilaterally without wheezing, no use of accessory muscles is noted  Cardiovascular: No jugular venous distention, no peripheral edema  Gastrointestinal: Soft and benign, no significant scars are noted, no distention    Laboratory data: Most recent hemoglobin several days ago was 11.1, chemistries are unremarkable.  Covid test done over 2 weeks ago negative.    Imaging data: CT  angiogram data from this admission in the last I reviewed.  It is compared with the prior CT from around 2 years ago of the abdomen.  On the initial CT of the abdomen the stomach appeared to be well opposed against the abdominal wall, but on more recent CT chest, the lower cuts appear to suggest that portion of the colon sits between the stomach and the abdominal wall.  There may be a small window laterally.      Assessment and plan:   -Recurrent aspiration pneumonia and associated dysphagia  -Severe malnutrition with body mass index of 16  -Mental retardation complicating above  -Discussion was undertaken with the patient's guardian.  The guardian is considering his options but seems to be leaning towards allowing for percutaneous feeding access.  I detailed the risks associated with the procedure, including, but not limited to, bleeding, infection, inability to perform the procedure percutaneously, associated aspiration and cardiopulmonary risks as well as injury to intra-abdominal structures and dislodgment of the tube.  We will plan to proceed once we have gotten confirmation from the patient's power of .  If we are able to hear later today, perhaps we can get him on the schedule for tomorrow, if not it may have to wait until next week.      Sigifredo Girard MD, FACS  General, Minimally Invasive and Endoscopic Surgery  [facility]    [address], [address]  [place], [place], [address]  P: [phone]  F: [phone]

## 2020-12-03 ENCOUNTER — ANESTHESIA (OUTPATIENT)
Dept: GASTROENTEROLOGY | Facility: HOSPITAL | Age: 65
End: 2020-12-03

## 2020-12-03 ENCOUNTER — ANESTHESIA EVENT (OUTPATIENT)
Dept: GASTROENTEROLOGY | Facility: HOSPITAL | Age: 65
End: 2020-12-03

## 2020-12-03 PROBLEM — R13.10 DYSPHAGIA: Status: ACTIVE | Noted: 2020-11-26

## 2020-12-03 PROBLEM — R13.12 OROPHARYNGEAL DYSPHAGIA: Status: ACTIVE | Noted: 2020-11-26

## 2020-12-03 LAB
ANION GAP SERPL CALCULATED.3IONS-SCNC: 5.4 MMOL/L (ref 5–15)
B PARAPERT DNA SPEC QL NAA+PROBE: NOT DETECTED
B PERT DNA SPEC QL NAA+PROBE: NOT DETECTED
BUN SERPL-MCNC: 23 MG/DL (ref 8–23)
BUN/CREAT SERPL: 32.4 (ref 7–25)
C PNEUM DNA NPH QL NAA+NON-PROBE: NOT DETECTED
CALCIUM SPEC-SCNC: 8.4 MG/DL (ref 8.6–10.5)
CHLORIDE SERPL-SCNC: 105 MMOL/L (ref 98–107)
CO2 SERPL-SCNC: 29.6 MMOL/L (ref 22–29)
CREAT SERPL-MCNC: 0.71 MG/DL (ref 0.76–1.27)
FLUAV SUBTYP SPEC NAA+PROBE: NOT DETECTED
FLUBV RNA ISLT QL NAA+PROBE: NOT DETECTED
GFR SERPL CREATININE-BSD FRML MDRD: 112 ML/MIN/1.73
GLUCOSE BLDC GLUCOMTR-MCNC: 100 MG/DL (ref 70–130)
GLUCOSE BLDC GLUCOMTR-MCNC: 110 MG/DL (ref 70–130)
GLUCOSE BLDC GLUCOMTR-MCNC: 120 MG/DL (ref 70–130)
GLUCOSE SERPL-MCNC: 131 MG/DL (ref 65–99)
HADV DNA SPEC NAA+PROBE: NOT DETECTED
HCOV 229E RNA SPEC QL NAA+PROBE: NOT DETECTED
HCOV HKU1 RNA SPEC QL NAA+PROBE: NOT DETECTED
HCOV NL63 RNA SPEC QL NAA+PROBE: NOT DETECTED
HCOV OC43 RNA SPEC QL NAA+PROBE: NOT DETECTED
HMPV RNA NPH QL NAA+NON-PROBE: NOT DETECTED
HPIV1 RNA SPEC QL NAA+PROBE: NOT DETECTED
HPIV2 RNA SPEC QL NAA+PROBE: NOT DETECTED
HPIV3 RNA NPH QL NAA+PROBE: NOT DETECTED
HPIV4 P GENE NPH QL NAA+PROBE: NOT DETECTED
M PNEUMO IGG SER IA-ACNC: NOT DETECTED
POTASSIUM SERPL-SCNC: 4.1 MMOL/L (ref 3.5–5.2)
RHINOVIRUS RNA SPEC NAA+PROBE: NOT DETECTED
RSV RNA NPH QL NAA+NON-PROBE: NOT DETECTED
SARS-COV-2 RNA NPH QL NAA+NON-PROBE: NOT DETECTED
SODIUM SERPL-SCNC: 140 MMOL/L (ref 136–145)

## 2020-12-03 PROCEDURE — 0202U NFCT DS 22 TRGT SARS-COV-2: CPT | Performed by: SURGERY

## 2020-12-03 PROCEDURE — 80048 BASIC METABOLIC PNL TOTAL CA: CPT | Performed by: INTERNAL MEDICINE

## 2020-12-03 PROCEDURE — 82962 GLUCOSE BLOOD TEST: CPT

## 2020-12-03 RX ADMIN — METRONIDAZOLE 500 MG: 500 INJECTION, SOLUTION INTRAVENOUS at 05:42

## 2020-12-03 RX ADMIN — DONEPEZIL HYDROCHLORIDE 5 MG: 5 TABLET, FILM COATED ORAL at 21:55

## 2020-12-03 RX ADMIN — MEMANTINE HYDROCHLORIDE 5 MG: 5 TABLET, FILM COATED ORAL at 09:23

## 2020-12-03 RX ADMIN — QUETIAPINE FUMARATE 50 MG: 50 TABLET, EXTENDED RELEASE ORAL at 21:55

## 2020-12-03 RX ADMIN — SODIUM CHLORIDE, PRESERVATIVE FREE 10 ML: 5 INJECTION INTRAVENOUS at 21:55

## 2020-12-03 RX ADMIN — SODIUM CHLORIDE, PRESERVATIVE FREE 10 ML: 5 INJECTION INTRAVENOUS at 09:24

## 2020-12-03 RX ADMIN — MEMANTINE HYDROCHLORIDE 5 MG: 5 TABLET, FILM COATED ORAL at 21:55

## 2020-12-03 RX ADMIN — LANSOPRAZOLE 30 MG: KIT at 06:46

## 2020-12-03 NOTE — PLAN OF CARE
Goal Outcome Evaluation:  Plan of Care Reviewed With: patient  Progress: no change  Outcome Summary: VSS, no s/s distress, Q2 turn, tube feed in place, pure wic, 5L NC, waiting for legal guardian for consent for surgery, will continue to monitor

## 2020-12-03 NOTE — SIGNIFICANT NOTE
12/03/20 1038   OTHER   Discipline speech language pathologist   Rehab Time/Intention   Session Not Performed patient unavailable for evaluation  (SLP discussed patient care with RN. Pt NPO for possible PEG placement this date. Will continue to follow.)

## 2020-12-03 NOTE — NURSING NOTE
S/w Beny Simon, state guardian. Mr. Simon gave consent for PEG tube placement surgery. He also s/w charge RN to give consent. Consent in chart

## 2020-12-03 NOTE — PLAN OF CARE
Problem: Adult Inpatient Plan of Care  Goal: Plan of Care Review  Outcome: Ongoing, Progressing  Flowsheets (Taken 12/3/2020 3747)  Plan of Care Reviewed With: patient  Outcome Summary: tube feeds to stop at 2300 tonight, peg tube placement in am, consent from POA, no acute changes, vss, will continue to monitor

## 2020-12-03 NOTE — PROGRESS NOTES
[facility]      Mar/chart reviewed  Follow up Acute hypoxemic respiratory failure  Extubated 11/29  Doesn't provide much subjective    Vital Sign Min/Max for last 24 hours  Temp  Min: 97.5 °F (36.4 °C)  Max: 98.2 °F (36.8 °C)   BP  Min: 94/68  Max: 112/64   Pulse  Min: 81  Max: 90   Resp  Min: 16  Max: 20   SpO2  Min: 93 %  Max: 100 %   Flow (L/min)  Min: 5  Max: 5   Weight  Min: 49.4 kg (109 lb)  Max: 49.4 kg (109 lb)     Appears ill,  arouses, sleepy  Right pupil is constricted and doesn't appear to move normally, or react normally, normal sclera, no palpable thyroid nodules,  mmm, no jvd, trachea midline, neck supple,  chest decreased ae, mildly coarse bilaterally, no crackles, no wheezes,   rrr,   soft, nt, nd +bs,  no c/c/ e  Skin warm, dry no rashes  Le contractures.     12/3: reviewed:  Glucose 131  Bun 23  Cr 0.71  Bicarb 29    A/P:  1. Acute hypoxemic respiratory failure --- doing ok, s/p extubation.   2. Aspiration pneumonia -- continue current antibiotic,  needs swallow eval, suspect he will need feeding tube, we will see  3. Dementia/cognitive defect -- resumed home meds  4. NESTEMI -- was treated with heparin briefly, echo pretty normal so heparin d/c'ed.   5. Hypernatremia -- improving,   6. dysphgia  7. Immobility syndrome  8. malnutrition    Feeding tube tomorrow.  D/c Saturday possibly

## 2020-12-03 NOTE — PROGRESS NOTES
Continued Stay Note  [facility]     Patient Name: Arian Persaud  MRN: [id number]  Today's Date: 12/3/2020    Admit Date: 11/26/2020    Discharge Plan     Row Name 12/03/20 1647       Plan    Plan  [facility] via EMS    Patient/Family in Agreement with Plan  yes    Plan Comments  Per Skylar Marc is following for pt to return anytime. Per Dr. Bedolla, possible d/c Saturday, 12/5. Pt will need EMS transport to SNF. Packet on pt chart. Lucina Reinoso LCSW        Discharge Codes    No documentation.       Expected Discharge Date and Time     Expected Discharge Date Expected Discharge Time    Dec 5, 2020             Dinorah Reinoso LCSW

## 2020-12-03 NOTE — ANESTHESIA PREPROCEDURE EVALUATION
Anesthesia Evaluation     Patient summary reviewed and Nursing notes reviewed   NPO Solid Status: > 6 hours  NPO Liquid Status: > 6 hours           Airway   Mallampati: II  TM distance: >3 FB  Neck ROM: full  No difficulty expected  Dental - normal exam     Pulmonary    (+) pneumonia stable , rhonchi,     ROS comment: Hx acute respiratory failure with hypoxia  Cardiovascular - normal exam    (+) hypertension less than 2 medications, past MI  1-7 days, CAD,       Neuro/Psych  (+) psychiatric history, dementia,       ROS Comment: Mental retardation  GI/Hepatic/Renal/Endo    (+)  GERD,  renal disease stones,     ROS Comment: Malnutrition    Musculoskeletal (-) negative ROS        ROS comment: Hx back injury  Abdominal   (+) scaphoid   Substance History - negative use     OB/GYN negative ob/gyn ROS         Other                      Anesthesia Plan    ASA 4     MAC     intravenous induction     Anesthetic plan, all risks, benefits, and alternatives have been provided, discussed and informed consent has been obtained with: patient.

## 2020-12-04 LAB
ANION GAP SERPL CALCULATED.3IONS-SCNC: 2.9 MMOL/L (ref 5–15)
BUN SERPL-MCNC: 23 MG/DL (ref 8–23)
BUN/CREAT SERPL: 36.5 (ref 7–25)
CALCIUM SPEC-SCNC: 8.4 MG/DL (ref 8.6–10.5)
CHLORIDE SERPL-SCNC: 105 MMOL/L (ref 98–107)
CO2 SERPL-SCNC: 31.1 MMOL/L (ref 22–29)
CREAT SERPL-MCNC: 0.63 MG/DL (ref 0.76–1.27)
GFR SERPL CREATININE-BSD FRML MDRD: 129 ML/MIN/1.73
GLUCOSE BLDC GLUCOMTR-MCNC: 101 MG/DL (ref 70–130)
GLUCOSE BLDC GLUCOMTR-MCNC: 107 MG/DL (ref 70–130)
GLUCOSE BLDC GLUCOMTR-MCNC: 92 MG/DL (ref 70–130)
GLUCOSE BLDC GLUCOMTR-MCNC: 95 MG/DL (ref 70–130)
GLUCOSE BLDC GLUCOMTR-MCNC: 98 MG/DL (ref 70–130)
GLUCOSE SERPL-MCNC: 86 MG/DL (ref 65–99)
POTASSIUM SERPL-SCNC: 4.1 MMOL/L (ref 3.5–5.2)
SODIUM SERPL-SCNC: 139 MMOL/L (ref 136–145)

## 2020-12-04 PROCEDURE — 25010000002 PROPOFOL 10 MG/ML EMULSION: Performed by: NURSE ANESTHETIST, CERTIFIED REGISTERED

## 2020-12-04 PROCEDURE — 3E0G76Z INTRODUCTION OF NUTRITIONAL SUBSTANCE INTO UPPER GI, VIA NATURAL OR ARTIFICIAL OPENING: ICD-10-PCS | Performed by: SURGERY

## 2020-12-04 PROCEDURE — 82962 GLUCOSE BLOOD TEST: CPT

## 2020-12-04 PROCEDURE — 80048 BASIC METABOLIC PNL TOTAL CA: CPT | Performed by: INTERNAL MEDICINE

## 2020-12-04 PROCEDURE — 43246 EGD PLACE GASTROSTOMY TUBE: CPT | Performed by: SURGERY

## 2020-12-04 PROCEDURE — 0DH63UZ INSERTION OF FEEDING DEVICE INTO STOMACH, PERCUTANEOUS APPROACH: ICD-10-PCS | Performed by: SURGERY

## 2020-12-04 PROCEDURE — 25010000002 ENOXAPARIN PER 10 MG: Performed by: INTERNAL MEDICINE

## 2020-12-04 RX ORDER — PROPOFOL 10 MG/ML
VIAL (ML) INTRAVENOUS AS NEEDED
Status: DISCONTINUED | OUTPATIENT
Start: 2020-12-04 | End: 2020-12-04 | Stop reason: SURG

## 2020-12-04 RX ORDER — SODIUM CHLORIDE 9 MG/ML
30 INJECTION, SOLUTION INTRAVENOUS CONTINUOUS PRN
Status: DISCONTINUED | OUTPATIENT
Start: 2020-12-04 | End: 2020-12-07 | Stop reason: HOSPADM

## 2020-12-04 RX ORDER — LIDOCAINE HYDROCHLORIDE 20 MG/ML
INJECTION, SOLUTION INFILTRATION; PERINEURAL AS NEEDED
Status: DISCONTINUED | OUTPATIENT
Start: 2020-12-04 | End: 2020-12-04 | Stop reason: SURG

## 2020-12-04 RX ORDER — CEFAZOLIN SODIUM 2 G/100ML
2 INJECTION, SOLUTION INTRAVENOUS ONCE
Status: DISCONTINUED | OUTPATIENT
Start: 2020-12-04 | End: 2020-12-04

## 2020-12-04 RX ORDER — PROPOFOL 10 MG/ML
VIAL (ML) INTRAVENOUS CONTINUOUS PRN
Status: DISCONTINUED | OUTPATIENT
Start: 2020-12-04 | End: 2020-12-04 | Stop reason: SURG

## 2020-12-04 RX ADMIN — ENOXAPARIN SODIUM 40 MG: 40 INJECTION SUBCUTANEOUS at 21:01

## 2020-12-04 RX ADMIN — QUETIAPINE FUMARATE 50 MG: 50 TABLET, EXTENDED RELEASE ORAL at 21:01

## 2020-12-04 RX ADMIN — DONEPEZIL HYDROCHLORIDE 5 MG: 5 TABLET, FILM COATED ORAL at 21:01

## 2020-12-04 RX ADMIN — MEMANTINE HYDROCHLORIDE 5 MG: 5 TABLET, FILM COATED ORAL at 21:00

## 2020-12-04 RX ADMIN — PROPOFOL 30 MG: 10 INJECTION, EMULSION INTRAVENOUS at 07:15

## 2020-12-04 RX ADMIN — PROPOFOL 50 MCG/KG/MIN: 10 INJECTION, EMULSION INTRAVENOUS at 07:15

## 2020-12-04 RX ADMIN — MEMANTINE HYDROCHLORIDE 5 MG: 5 TABLET, FILM COATED ORAL at 08:17

## 2020-12-04 RX ADMIN — SODIUM CHLORIDE, PRESERVATIVE FREE 10 ML: 5 INJECTION INTRAVENOUS at 22:38

## 2020-12-04 RX ADMIN — SODIUM CHLORIDE, PRESERVATIVE FREE 10 ML: 5 INJECTION INTRAVENOUS at 08:17

## 2020-12-04 RX ADMIN — SODIUM CHLORIDE 30 ML/HR: 9 INJECTION, SOLUTION INTRAVENOUS at 06:27

## 2020-12-04 RX ADMIN — LIDOCAINE HYDROCHLORIDE 100 MG: 20 INJECTION, SOLUTION INFILTRATION; PERINEURAL at 07:15

## 2020-12-04 NOTE — OP NOTE
Operative Note :   MD Arian Jiang  1955    Procedure Date: 12/04/20    Pre-op Diagnosis:  · Dysphagia with associated aspiration pneumonia    Post-op Diagnosis:  · Same    Procedure:   · Esophagogastroduodenoscopy with placement of percutaneous endoscopic gastrostomy tube    Surgeon: Sigifredo Girard MD      Anesthesia: MAC    Indications:  · Unfortunate 64-year-old gentleman admitted with aspiration pneumonia who failed swallow study and has severe malnutrition presents for percutaneous feeding access.    Findings:   · Feeding tube secured at 2.5 cm at the skin    Recommendations:   · Tube feeds to begin tomorrow    Description of procedure:    After obtaining informed consent, patient was brought to the endoscopy suite and was sedated.  The gastroscope was introduced through the mouth, through the cricopharyngeus into the esophagus and then passed the GE junction into the stomach and then through the pylorus and into the duodenum.  The junction of the second and third portion of the duodenum was reached.  There were no significant erosions and no findings of duodenitis.  The nasojejunal feeding tube was removed.  The scope was pulled back into the stomach.  There was no significant gastritis and retroflexion demonstrated no significant hiatus hernia.  The stomach was insufflated completely.  I was able to identify an area on the abdominal wall in the left upper quadrant just below the costal margin where there was good transillumination through the gastric wall.  This area was marked and then sterilely prepped and draped.  The area was then infiltrated with a 1% lidocaine solution and the needle was easily passed into the stomach.  An 8 mm dermatotomy was then made and the 18-gauge needle and sheath was then introduced through the abdominal wall and into the stomach under gastroscopic vision.  The wire was then passed through the sheath after removing the needle and the wire was then  grasped with the snare.  The wire was then pulled up through the patient's mouth and the 20 [place] feeding tube was then passed over the wire.  The tube was then pulled back into the patient's stomach and then flushed up against the gastric wall.  The tube was secured at 2.5 cm.  The gastroscope was reintroduced to ensure appropriate positioning which was confirmed.  The stomach was desufflated and the scope was removed completely.  The tube was secured and cut to an appropriate length.  The patient tolerated this adequately.    Sigifredo Girard MD  General and Endoscopic Surgery  [facility]    [address], [address]  [place], [place], [address]  P: [phone]  F: [phone]

## 2020-12-04 NOTE — PROGRESS NOTES
"Physicians Statement of Medical Necessity for  Ambulance Transportation    GENERAL INFORMATION     Name: Arian Persaud  YOB: 1955  Medicare #: [id number]  Transport Date:       (Valid for round trips this date, or for scheduled repetitive trips for 60 days from the date signed below.)  Origin: [facility] Destination: [facility]  Is the Patient's stay covered under Medicare Part A (PPS/DRG?)Yes   Closest appropriate facility? Yes  If this a hosp-hosp transfer? No  Is this a hospice patient? No    MEDICAL NECESSITY QUESTIONAIRE    Ambulance Transportation is medically necessary only if other means of transportation are contraindicated or would be potentially harmful to the patient.  To meet this requirement, the patient must be either \"bed confined\" or suffer from a condition such that transport by means other than an ambulance is contraindicated by the patient's condition.  The following questions must be answered by the healthcare professional signing below for this form to be valid:     1) Describe the MEDICAL CONDITION (physical and/or mental) of this patient AT THE TIME OF AMBULANCE TRANSPORT that requires the patient to be transported in an ambulance, and why transport by other means is contraindicated by the patient's condition: immobility, confusion, falls risk  Past Medical History:   Diagnosis Date   • Coronary artery disease    • Injury of back    • Kidney stones    • Mental retardation       Past Surgical History:   Procedure Laterality Date   • KIDNEY SURGERY     • TONSILLECTOMY        2) Is this patient \"bed confined\" as defined below?Yes    To be \"bed confined\" the patient must satisfy all three of the following criteria:  (1) unable to get up from bed without assistance; AND (2) unable to ambulate;  AND (3) unable to sit in a chair or wheelchair.  3) Can this patient safely be transported by car or wheelchair van (I.e., may safely sit during transport, without " an attendant or monitoring?)No   4. In addition to completing questions 1-3 above, please check any of the following conditions that apply*:          *Note: supporting documentation for any boxes checked must be maintained in the patient's medical records Patient is confused and Unable to tolerate seated position for time needed to transport      SIGNATURE OF PHYSICIAN OR OTHER AUTHORIZED HEALTHCARE PROFESSIONAL    I certify that the above information is true and correct based on my evaluation of this patient, and represent that the patient requires transport by ambulance and that other forms of transport are contraindicated.  I understand that this information will be used by the Centers for Medicare and Medicaid Services (CMS) to support the determiniation of medical necessity for ambulance services, and I represent that I have personal knowledge of the patient's condition at the time of transport.    X   If this box is checked, I also certify that the patient is physically or mentally incapable of signing the ambulance service's claim form and that the institution with which I am affiliated has furnished care, services or assistance to the patient.  My signature below is made on behalf of the patient pursuant to 42 .36(b)(4). In accordance with 42 .37, the specific reason(s) that the patient is physically or mentally incapable of signing the claim for is as follows: confusion/disortientation    Signature of Physician or Healthcare Professional  Date/Time:        (For Scheduled repetitive transport, this form is not valid for transports performed more than 60 days after this date).                                                                                                                                            --------------------------------------------------------------------------------------------  Printed Name and Credentials of Physician or Authorized Healthcare Professional     *Form  must be signed by patient's attending physician for scheduled, repetitive transports,.  For non-repetitive ambulance transports, if unable to obtain the signature of the attending physician, any of the following may sign (please select below):     Physician  Clinical Nurse Specialist  Registered Nurse     Physician Assistant  Discharge Planner  Licensed Practical Nurse     Nurse Practitioner

## 2020-12-04 NOTE — PROGRESS NOTES
Continued Stay Note  [facility]     Patient Name: Arian Persaud  MRN: [id number]  Today's Date: 12/4/2020    Admit Date: 11/26/2020    Discharge Plan     Row Name 12/04/20 1127       Plan    Plan  [facility] via EMS    Patient/Family in Agreement with Plan  yes    Plan Comments  Per Skylar Marc is following to accept pt back anytime. D/c anticipated tomorrow, 12/5. CCP updated pt's guardian (Beny Simon, [phone]) via telephone and provided IMM notice. Pt will require EMS transport. Packet on pt chart. Lucina Reinoso LCSW        Discharge Codes    No documentation.       Expected Discharge Date and Time     Expected Discharge Date Expected Discharge Time    Dec 5, 2020             Dinorah Reinoso LCSW

## 2020-12-04 NOTE — ANESTHESIA POSTPROCEDURE EVALUATION
"Patient: Arian Persaud    Procedure Summary     Date: 12/04/20 Room / Location:  [facility]    Anesthesia Start: 0706 Anesthesia Stop: 0738    Procedure: ESOPHAGOGASTRODUODENOSCOPY WITH PERCUTANEOUS ENDOSCOPIC GASTROSTOMY TUBE INSERTION (N/A Esophagus) Diagnosis:       Oropharyngeal dysphagia      (Oropharyngeal dysphagia [R13.12])    Surgeon: Sigifredo Girard MD Provider: Rafiq Yost MD    Anesthesia Type: MAC ASA Status: 4          Anesthesia Type: MAC    Vitals  Vitals Value Taken Time   /66 12/04/20 0743   Temp     Pulse 82 12/04/20 0732   Resp 16 12/04/20 0732   SpO2 100 % 12/04/20 0743           Post Anesthesia Care and Evaluation    Patient location during evaluation: PACU  Patient participation: complete - patient participated  Level of consciousness: responsive to physical stimuli  Pain score: 0  Pain management: adequate  Airway patency: patent  Anesthetic complications: No anesthetic complications  PONV Status: none  Cardiovascular status: acceptable  Respiratory status: acceptable and nasal cannula  Hydration status: acceptable    Comments: /66 (BP Location: Right leg, Patient Position: Lying)   Pulse 82   Temp 36.7 °C (98.1 °F) (Axillary)   Resp 16   Ht 175.3 cm (69\")   Wt 50 kg (110 lb 3.7 oz)   SpO2 100%   BMI 16.28 kg/m²       "

## 2020-12-04 NOTE — PROGRESS NOTES
[facility]      Mar/chart reviewed  Follow up Acute hypoxemic respiratory failure  Extubated 11/29  Doesn't provide much subjective  Peg tube placed today    Vital Sign Min/Max for last 24 hours  Temp  Min: 97.8 °F (36.6 °C)  Max: 99.8 °F (37.7 °C)   BP  Min: 87/46  Max: 138/68   Pulse  Min: 78  Max: 94   Resp  Min: 16  Max: 18   SpO2  Min: 92 %  Max: 100 %   Flow (L/min)  Min: 4  Max: 10   Weight  Min: 50 kg (110 lb 3.7 oz)  Max: 50 kg (110 lb 3.7 oz)     Appears ill,  arouses, sleepy  Right pupil is constricted and doesn't appear to move normally, or react normally, normal sclera, no palpable thyroid nodules,  mmm, no jvd, trachea midline, neck supple,  chest decreased ae, mildly coarse bilaterally, no crackles, no wheezes,   rrr,   soft, nt, nd +bs,  no c/c/ e  Skin warm, dry no rashes  Le contractures.     Labs: 12/4: reviewed:  Na 139  Cr 0.63  Bicarb 31    A/P:  1. Acute hypoxemic respiratory failure --- doing ok, s/p extubation.   2. Aspiration pneumonia -- finish current antibiotic,  peg tube placed 12/3  3. Dementia/cognitive defect -- resumed home meds  4. NESTEMI -- was treated with heparin briefly, echo pretty normal so heparin d/c'ed.   5. Hypernatremia -- improving,   6. dysphgia  7. Immobility syndrome  8. malnutrition       D/c Saturday possibly

## 2020-12-04 NOTE — PLAN OF CARE
Problem: Adult Inpatient Plan of Care  Goal: Plan of Care Review  Outcome: Ongoing, Progressing  Flowsheets (Taken 12/4/2020 1524)  Plan of Care Reviewed With: patient  Outcome Summary: PEG placed today, tube feeds to start in the morning, can go back to [place] tomorrow, vss, will continue to monitor

## 2020-12-04 NOTE — PLAN OF CARE
Goal Outcome Evaluation:  Plan of Care Reviewed With: patient  Progress: improving  Outcome Summary: VSS, tube feed stopped at 2300, plan for peg tube placement today, pt more alert and trying to communicate, Q2 turn, will continue to monitor

## 2020-12-04 NOTE — PROGRESS NOTES
Adult Nutrition  Assessment/PES    Patient Name:  Arian Persaud  YOB: 1955  MRN: [id number]  Admit Date:  11/26/2020    Assessment Date:  12/4/2020    Comments:  Nutrition follow up.  PEG placed today.  Large BM today as well. Labs reviewed. TF remains on hold with plans to resume in the morning.  Would start at 20cc/hr and increase 20 cc q 8 hrs to goal of 60cc/hr, Continue 25cc/hr free water. Will cont to follow clinical course, nutrition support needs.    Reason for Assessment     Row Name 12/04/20 1510          Reason for Assessment    Reason For Assessment  TF/PN;follow-up protocol         Nutrition/Diet History     Row Name 12/04/20 1510          Nutrition/Diet History    Factors Affecting Nutritional Intake  impaired cognitive status/motor control;difficulty/impaired swallowing           Labs/Tests/Procedures/Meds     Row Name 12/04/20 1510          Labs/Procedures/Meds    Lab Results Reviewed  reviewed, pertinent        Diagnostic Tests/Procedures    Diagnostic Test/Procedure Reviewed  reviewed, pertinent     Diagnostic Test/Procedures Comments  PEG placed today        Medications    Pertinent Medications Reviewed  reviewed, pertinent     Pertinent Medications Comments  lovenox, insulin, ppi, seroquel         Physical Findings     Row Name 12/04/20 1511          Physical Findings    Overall Physical Appearance  underweight;on oxygen therapy     Gastrointestinal  feeding tube     Tubes  PEG     Skin  other (see comments);pressure injury B=12, PI to foot           Nutrition Prescription Ordered     Row Name 12/04/20 1512          Nutrition Prescription PO    Current PO Diet  NPO        Nutrition Prescription EN    Enteral Route  PEG     Product  Fibersource HN (Jevity 1.2)     TF Delivery Method  Continuous     Continuous TF Goal Rate (mL/hr)  60 mL/hr     Continuous TF Current Rate (mL/hr)  0 mL/hr     Water flush (mL)   25 mL     Water Flush Frequency  Per hour         Evaluation of  Received Nutrient/Fluid Intake     Row Name 12/04/20 1513          EN Evaluation    TF Changes  Held to resume in am     TF Tolerance  Other (comment) large BM today     HOB  Greater than or equal to 30 degress         Problem/Interventions:    Intervention Goal     Row Name 12/04/20 1514          Intervention Goal    General  Maintain nutrition;Nutrition support treatment;Disease management/therapy;Reduce/improve symptoms;Meet nutritional needs for age/condition;Improved nutrition related lab(s)     TF/PN  Inititiate TF/PN;Tolerate TF at goal     Weight  Appropriate weight gain         Nutrition Intervention     Row Name 12/04/20 1514          Nutrition Intervention    RD/Tech Action  Follow Tx progress;Care plan reviewd;Recommend/ordered     Recommended/Ordered  EN         Nutrition Prescription     Row Name 12/04/20 1514          Nutrition Prescription EN    Enteral Prescription  Continue same protocol             Electronically signed by:  Muna Baptiste RD  12/04/20 15:15 EST

## 2020-12-05 LAB
ANION GAP SERPL CALCULATED.3IONS-SCNC: 5.9 MMOL/L (ref 5–15)
BUN SERPL-MCNC: 21 MG/DL (ref 8–23)
BUN/CREAT SERPL: 27.3 (ref 7–25)
CALCIUM SPEC-SCNC: 8.6 MG/DL (ref 8.6–10.5)
CHLORIDE SERPL-SCNC: 100 MMOL/L (ref 98–107)
CO2 SERPL-SCNC: 29.1 MMOL/L (ref 22–29)
CREAT SERPL-MCNC: 0.77 MG/DL (ref 0.76–1.27)
GFR SERPL CREATININE-BSD FRML MDRD: 102 ML/MIN/1.73
GLUCOSE BLDC GLUCOMTR-MCNC: 136 MG/DL (ref 70–130)
GLUCOSE BLDC GLUCOMTR-MCNC: 80 MG/DL (ref 70–130)
GLUCOSE BLDC GLUCOMTR-MCNC: 91 MG/DL (ref 70–130)
GLUCOSE BLDC GLUCOMTR-MCNC: 96 MG/DL (ref 70–130)
GLUCOSE SERPL-MCNC: 83 MG/DL (ref 65–99)
POTASSIUM SERPL-SCNC: 4.2 MMOL/L (ref 3.5–5.2)
SODIUM SERPL-SCNC: 135 MMOL/L (ref 136–145)

## 2020-12-05 PROCEDURE — 99231 SBSQ HOSP IP/OBS SF/LOW 25: CPT | Performed by: SURGERY

## 2020-12-05 PROCEDURE — 80048 BASIC METABOLIC PNL TOTAL CA: CPT | Performed by: SURGERY

## 2020-12-05 PROCEDURE — 82962 GLUCOSE BLOOD TEST: CPT

## 2020-12-05 PROCEDURE — 25010000002 ENOXAPARIN PER 10 MG: Performed by: INTERNAL MEDICINE

## 2020-12-05 RX ADMIN — SODIUM CHLORIDE, PRESERVATIVE FREE 10 ML: 5 INJECTION INTRAVENOUS at 12:05

## 2020-12-05 RX ADMIN — QUETIAPINE FUMARATE 50 MG: 50 TABLET, EXTENDED RELEASE ORAL at 21:11

## 2020-12-05 RX ADMIN — LANSOPRAZOLE 30 MG: KIT at 06:14

## 2020-12-05 RX ADMIN — SODIUM CHLORIDE, PRESERVATIVE FREE 10 ML: 5 INJECTION INTRAVENOUS at 21:11

## 2020-12-05 RX ADMIN — MEMANTINE HYDROCHLORIDE 5 MG: 5 TABLET, FILM COATED ORAL at 21:11

## 2020-12-05 RX ADMIN — DONEPEZIL HYDROCHLORIDE 5 MG: 5 TABLET, FILM COATED ORAL at 21:11

## 2020-12-05 RX ADMIN — MEMANTINE HYDROCHLORIDE 5 MG: 5 TABLET, FILM COATED ORAL at 12:05

## 2020-12-05 RX ADMIN — ENOXAPARIN SODIUM 40 MG: 40 INJECTION SUBCUTANEOUS at 21:11

## 2020-12-05 NOTE — PLAN OF CARE
Problem: Adult Inpatient Plan of Care  Goal: Plan of Care Review  Outcome: Ongoing, Progressing  Flowsheets (Taken 12/5/2020 0303)  Progress: improving  Plan of Care Reviewed With: patient  Outcome Summary: VSS, PEG in place, tube feeds to be started this AM, will continue to monitor.

## 2020-12-05 NOTE — PROGRESS NOTES
Chief Complaint:    POD 1, S/P EGD with PEG placement    Subjective:    Tube feeding running at 40 mL.    Objective:    Vitals:    12/04/20 2241 12/05/20 0442 12/05/20 0745 12/05/20 1448   BP:   105/66 103/64   BP Location:   Left arm Left arm   Patient Position:   Lying Lying   Pulse:   88 86   Resp:   18 18   Temp:   97.4 °F (36.3 °C) 97.6 °F (36.4 °C)   TempSrc:   Oral Oral   SpO2: 97%  98% 97%   Weight:  49.8 kg (109 lb 12.6 oz)     Height:           Lungs: Clear  Heart: Regular  Abdomen: PEG site looks okay. BS present.   Extremities: Warm    Labs reviewed.  Creatinine 0.77    Assessment:    POD 1, S/P EGD with PEG placement    Plan:    Advance tube feedings to goal rate present

## 2020-12-06 VITALS
OXYGEN SATURATION: 94 % | TEMPERATURE: 99 F | RESPIRATION RATE: 18 BRPM | HEIGHT: 69 IN | BODY MASS INDEX: 16.26 KG/M2 | DIASTOLIC BLOOD PRESSURE: 71 MMHG | HEART RATE: 93 BPM | WEIGHT: 109.79 LBS | SYSTOLIC BLOOD PRESSURE: 110 MMHG

## 2020-12-06 LAB
ANION GAP SERPL CALCULATED.3IONS-SCNC: 6.9 MMOL/L (ref 5–15)
BUN SERPL-MCNC: 20 MG/DL (ref 8–23)
BUN/CREAT SERPL: 25 (ref 7–25)
CALCIUM SPEC-SCNC: 8.5 MG/DL (ref 8.6–10.5)
CHLORIDE SERPL-SCNC: 100 MMOL/L (ref 98–107)
CO2 SERPL-SCNC: 31.1 MMOL/L (ref 22–29)
CREAT SERPL-MCNC: 0.8 MG/DL (ref 0.76–1.27)
GFR SERPL CREATININE-BSD FRML MDRD: 98 ML/MIN/1.73
GLUCOSE BLDC GLUCOMTR-MCNC: 137 MG/DL (ref 70–130)
GLUCOSE BLDC GLUCOMTR-MCNC: 137 MG/DL (ref 70–130)
GLUCOSE BLDC GLUCOMTR-MCNC: 147 MG/DL (ref 70–130)
GLUCOSE BLDC GLUCOMTR-MCNC: 148 MG/DL (ref 70–130)
GLUCOSE BLDC GLUCOMTR-MCNC: 164 MG/DL (ref 70–130)
GLUCOSE SERPL-MCNC: 159 MG/DL (ref 65–99)
POTASSIUM SERPL-SCNC: 4 MMOL/L (ref 3.5–5.2)
SODIUM SERPL-SCNC: 138 MMOL/L (ref 136–145)

## 2020-12-06 PROCEDURE — 25010000002 ENOXAPARIN PER 10 MG: Performed by: INTERNAL MEDICINE

## 2020-12-06 PROCEDURE — 82962 GLUCOSE BLOOD TEST: CPT

## 2020-12-06 PROCEDURE — 80048 BASIC METABOLIC PNL TOTAL CA: CPT | Performed by: SURGERY

## 2020-12-06 PROCEDURE — 63710000001 INSULIN REGULAR HUMAN PER 5 UNITS: Performed by: SURGERY

## 2020-12-06 PROCEDURE — 99231 SBSQ HOSP IP/OBS SF/LOW 25: CPT | Performed by: SURGERY

## 2020-12-06 RX ORDER — AMOXICILLIN AND CLAVULANATE POTASSIUM 250; 62.5 MG/5ML; MG/5ML
500 POWDER, FOR SUSPENSION ORAL 2 TIMES DAILY
Qty: 60 ML | Refills: 0 | Status: CANCELLED | OUTPATIENT
Start: 2020-12-06 | End: 2020-12-09

## 2020-12-06 RX ORDER — QUETIAPINE FUMARATE 50 MG/1
50 TABLET, EXTENDED RELEASE ORAL NIGHTLY
Qty: 90 EACH | Refills: 0 | Status: SHIPPED | OUTPATIENT
Start: 2020-12-06

## 2020-12-06 RX ORDER — MEMANTINE HYDROCHLORIDE 5 MG/1
5 TABLET ORAL EVERY 12 HOURS SCHEDULED
Qty: 30 TABLET | Refills: 0 | Status: SHIPPED | OUTPATIENT
Start: 2020-12-06

## 2020-12-06 RX ORDER — LANSOPRAZOLE
30 KIT EVERY MORNING
Qty: 300 ML | Refills: 0 | Status: SHIPPED | OUTPATIENT
Start: 2020-12-07

## 2020-12-06 RX ADMIN — SODIUM CHLORIDE, PRESERVATIVE FREE 10 ML: 5 INJECTION INTRAVENOUS at 11:52

## 2020-12-06 RX ADMIN — INSULIN HUMAN 3 UNITS: 100 INJECTION, SOLUTION PARENTERAL at 06:34

## 2020-12-06 RX ADMIN — MEMANTINE HYDROCHLORIDE 5 MG: 5 TABLET, FILM COATED ORAL at 11:51

## 2020-12-06 RX ADMIN — LANSOPRAZOLE 30 MG: KIT at 06:18

## 2020-12-06 RX ADMIN — SODIUM CHLORIDE, PRESERVATIVE FREE 10 ML: 5 INJECTION INTRAVENOUS at 20:41

## 2020-12-06 RX ADMIN — ENOXAPARIN SODIUM 40 MG: 40 INJECTION SUBCUTANEOUS at 20:39

## 2020-12-06 NOTE — PROGRESS NOTES
Chief Complaint:    POD 2, S/P EGD with PEG placement    Subjective:    Tube feeding running at 60 mL.    Objective:    Vitals:    12/05/20 1900 12/05/20 2249 12/06/20 0732 12/06/20 1312   BP: 122/73 116/78 100/59 107/66   BP Location: Left arm Left arm Left arm Left arm   Patient Position: Lying Lying Lying Lying   Pulse: 87 97 75 95   Resp: 18 18 16 14   Temp: 99.1 °F (37.3 °C) 98.3 °F (36.8 °C) 98.5 °F (36.9 °C) 98.6 °F (37 °C)   TempSrc: Axillary Axillary Oral Oral   SpO2: 95% 96% 100% 96%   Weight:       Height:           Lungs: Clear  Heart: Regular  Abdomen: PEG site looks okay. BS present.   Extremities: Warm    Labs reviewed.  Creatinine 0.80    Assessment:    POD 2, S/P EGD with PEG placement    Plan:    Tube feeds at goal rate  Surgery will sign off  Please call again if there are problems

## 2020-12-06 NOTE — DISCHARGE SUMMARY
Date of Discharge:  12/6/2020    Discharge Diagnoses:  1. Acute hypoxic respiratory failure  2. Aspiration pneumonia  3. Non-ST elevation type II MI demand ischemia  4. Hypernatremia  5. Oral pharyngeal dysphagia  6. Severe protein calorie malnutrition  7. Immobility syndrome  8. Advanced dementia      Hospital Course  Patient is a 64 y.o. male presented with aspiration pneumonia he has advanced dementia he was hypoxic had some demand ischemic troponin type elevations echocardiogram showed normal LVEF.  He was hyponatremic he did not pass swallow eval's he had a PEG tube placed and is receiving tube feeds patient is going to continue to aspirate this will be a recurrent problem he has very advanced end-stage dementia our recommendations would be for the guardians to pursue full palliative measures..      Procedures Performed  Procedure(s):  ESOPHAGOGASTRODUODENOSCOPY WITH PERCUTANEOUS ENDOSCOPIC GASTROSTOMY TUBE INSERTION       Consults:   Consults     Date and Time Order Name Status Description    12/2/2020 1411 Inpatient General Surgery Consult Completed     11/26/2020 1704 Inpatient Cardiology Consult Completed     11/26/2020 1615 Pulmonology (on-call MD unless specified) Completed     11/26/2020 1614 Interventional Cardiology (on-call MD unless specified) Completed     11/15/2020 1147 Inpatient Cardiology Consult Completed           Pertinent Test Results:   Labs:  Results from last 7 days   Lab Units 12/06/20  0510 12/05/20  0458 12/04/20  0442   GLUCOSE mg/dL 159* 83 86   SODIUM mmol/L 138 135* 139   POTASSIUM mmol/L 4.0 4.2 4.1   CO2 mmol/L 31.1* 29.1* 31.1*   CHLORIDE mmol/L 100 100 105   ANION GAP mmol/L 6.9 5.9 2.9*   CREATININE mg/dL 0.80 0.77 0.63*   BUN mg/dL 20 21 23   BUN / CREAT RATIO  25.0 27.3* 36.5*   CALCIUM mg/dL 8.5* 8.6 8.4*   EGFR IF NONAFRICN AM mL/min/1.73 98 102 129     Estimated Creatinine Clearance: 67.4 mL/min (by C-G formula based on SCr of 0.8 mg/dL).                                     Imaging Results (Last 72 Hours)     ** No results found for the last 72 hours. **        Results for orders placed during the hospital encounter of 11/26/20   Adult Transthoracic Echo Complete W/ Cont if Necessary Per Protocol    Narrative · Estimated left ventricular EF = 67% Left ventricular systolic function   is normal.  · Left ventricular diastolic function was normal.  · The right ventricular cavity is moderately dilated.  · Moderately reduced right ventricular systolic function noted.  · Mild tricuspid valve regurgitation is present. Estimated right   ventricular systolic pressure from tricuspid regurgitation is normal (<35   mmHg).  · There is a small (<1cm) pericardial effusion.              Condition on Discharge: He is improved from admission but he has a terminal condition    Vital Signs  Temp:  [97.6 °F (36.4 °C)-99.1 °F (37.3 °C)] 98.6 °F (37 °C)  Heart Rate:  [75-97] 95  Resp:  [14-18] 14  BP: (100-122)/(59-78) 107/66    Physical Exam:    General Appearance: Elderly white male he looks decades older than his stated age he just lies in bed  Eyes: Conjunctiva are clear pupils look equal  ENT: Very poor dentition dry mucous membranes he clearly is not protecting his airway and you can hear him aspirate on secretions at times  Neck: No adenopathy ,trachea midline no visible jugular venous distention  Lungs: I really do not hear any wheezes he has a few rhonchi he is not labored  Cardiac: Regular rate rhythm  Abdomen: Soft no palpable hepatosplenomegaly his PEG site is soft  : Not examined  Musculoskeletal: He does not have a whole lot of muscle mass  Skin: Warm and dry no jaundice  Neuro: He is really not following any commands he basically is just lying in bed  Extremities/P Vascular: No edema palpable radial and dorsalis pedis pulses  MSE: Unable to assess    Discharge Disposition  Skilled Nursing Facility (DC - External)    Discharge Medications     Discharge Medications      New Medications       Instructions Start Date   lansoprazole 3 MG/ML suspension oral suspension   30 mg, Nasogastric, Every Morning   Start Date: December 7, 2020        Changes to Medications      Instructions Start Date   memantine 5 MG tablet  Commonly known as: NAMENDA  What changed: when to take this   5 mg, Oral, Every 12 Hours Scheduled      QUEtiapine fumarate ER 50 MG tablet sustained-release 24 hour tablet  Commonly known as: SEROquel XR  What changed:   · medication strength  · how much to take   50 mg, Oral, Nightly         Continue These Medications      Instructions Start Date   acetaminophen 325 MG tablet  Commonly known as: TYLENOL   650 mg, Oral, Every 6 Hours PRN      docusate sodium 100 MG capsule   100 mg, Oral, 2 Times Daily      donepezil 5 MG tablet  Commonly known as: ARICEPT   5 mg, Oral, Nightly      escitalopram 20 MG tablet  Commonly known as: LEXAPRO   20 mg, Oral, Daily      pravastatin 10 MG tablet  Commonly known as: PRAVACHOL   10 mg, Oral, Daily         Stop These Medications    amoxicillin-clavulanate 250-62.5 MG/5ML suspension  Commonly known as: Augmentin     losartan 50 MG tablet  Commonly known as: COZAAR     multivitamin with minerals tablet tablet     pantoprazole 40 MG EC tablet  Commonly known as: PROTONIX            Discharge Diet: Continue tube feeds via PEG tube    Activity at Discharge:     Follow-up Appointments  No future appointments.      Test Results Pending at Discharge       Bon Foy MD  12/06/20  14:39 EST    Time:

## 2020-12-06 NOTE — PROGRESS NOTES
Case Management Discharge Note      Final Note: Rec'd call from pt's RN; pt to dc later today per MD. [facility] scheduled to transport to [place] @ 2100 (s/w Annika # [id number]). Nsg to call report and update NOK. Sent message to notify [facility] [place].    Provided Post Acute Provider List?: N/A  Provided Post Acute Provider Quality & Resource List?: N/A    Selected Continued Care - Admitted Since 11/26/2020     Destination Coordination complete    Service Provider Selected Services Address Phone Fax Patient Preferred    [facility]  Skilled Nursing [address] [address] [phone] --          Durable Medical Equipment    No services have been selected for the patient.              Dialysis/Infusion    No services have been selected for the patient.              Home Medical Care    No services have been selected for the patient.              Therapy    No services have been selected for the patient.              Community Resources    No services have been selected for the patient.                Selected Continued Care - Prior Encounters Includes selections from prior encounters from 8/28/2020 to 12/6/2020    Discharged on 11/23/2020 Admission date: 11/14/2020 - Discharge disposition: Skilled Nursing Facility (DC - External)    Destination     Service Provider Selected Services Address Phone Fax Patient Preferred    [facility]  Intermediate Care [address] [address] [phone] --                    Transportation Services  Ambulance: [facility]    Final Discharge Disposition Code: 03 - skilled nursing facility (SNF)

## 2020-12-06 NOTE — PLAN OF CARE
Problem: Adult Inpatient Plan of Care  Goal: Plan of Care Review  Outcome: Ongoing, Progressing  Flowsheets (Taken 12/6/2020 0340)  Progress: improving  Plan of Care Reviewed With: patient  Outcome Summary: VSS, tube feeds at goal of 60mL/hr, no acute changes over night, will continue to monitor.

## 2020-12-06 NOTE — PROGRESS NOTES
LOS: 9 days   Patient Care Team:  Hyacinth Elizabeth MD as PCP - General (Internal Medicine)    Subjective     Patient basically lying in bed start of the positive O sign mouth opening somewhat sonorous respirations he is really not doing anything.  Attending nurse was actually at bedside she said she thought she heard some wheezing earlier.    Review of Systems:          Objective     Vital Signs  Vital Sign Min/Max for last 24 hours  Temp  Min: 97.4 °F (36.3 °C)  Max: 99.1 °F (37.3 °C)   BP  Min: 103/64  Max: 122/73   Pulse  Min: 82  Max: 93   Resp  Min: 18  Max: 18   SpO2  Min: 95 %  Max: 99 %   Flow (L/min)  Min: 3  Max: 4   Weight  Min: 49.8 kg (109 lb 12.6 oz)  Max: 49.8 kg (109 lb 12.6 oz)        Ventilator/Non-Invasive Ventilation Settings (From admission, onward)     Start     Ordered    11/29/20 0359  Ventilator - AC/PC; 12 (26); 30; 90%; 8; 18  Continuous,   Status:  Canceled     Question Answer Comment   Vent Mode AC/PC    Breath rate 12 26   FiO2 30    FiO2 titrate for Sp02% =/> 90%    PEEP 8    Inspiratory Pressure 18        11/29/20 0358    11/28/20 1112  Ventilator - AC/PC; 12 (26); 40; 90%; 8; 18  Continuous,   Status:  Canceled     Question Answer Comment   Vent Mode AC/PC    Breath rate 12 26   FiO2 40    FiO2 titrate for Sp02% =/> 90%    PEEP 8    Inspiratory Pressure 18        11/28/20 1112    11/28/20 1042  Ventilator - AC/VC+; 12 (26); 100; 90%; 12; 450  Continuous,   Status:  Canceled     Question Answer Comment   Vent Mode AC/VC+    Breath rate 12 26   FiO2 100    FiO2 titrate for Sp02% =/> 90%    PEEP 12    Tidal Volume 450        11/28/20 1041    11/26/20 1545  Ventilator - AC/VC+; (26); 100; 90%; 12; 450  Continuous,   Status:  Canceled     Question Answer Comment   Vent Mode AC/VC+    Breath rate  26   FiO2 100    FiO2 titrate for Sp02% =/> 90%    PEEP 12    Tidal Volume 450        11/26/20 1620                             Body mass index is 16.21 kg/m².  I/O last 3  completed shifts:  In: 1190 [I.V.:400; Other:390; NG/GT:400]  Out: 400 [Urine:400]  No intake/output data recorded.        Physical Exam:  General Appearance: Elderly white male he looks decades older than his stated age he just lies in bed  Eyes: Conjunctiva are clear pupils look equal  ENT: Very poor dentition dry mucous membranes he clearly is not protecting his airway and you can hear him aspirate on secretions at times  Neck: No adenopathy trachea midline no visible jugular venous distention  Lungs: I really do not hear any wheezes he has a few rhonchi he is not labored  Cardiac: Regular rate rhythm  Abdomen: Soft no palpable hepatosplenomegaly his PEG site is soft  : Not examined  Musculoskeletal: He does not have a whole lot of muscle mass  Skin: Warm and dry no jaundice  Neuro: He is really not following any commands he basically is just lying in bed  Extremities/P Vascular: No edema palpable radial and dorsalis pedis pulses  MSE: Unable to assess       Labs:  Results from last 7 days   Lab Units 12/05/20  0458 12/04/20  0442 12/03/20  0415 12/02/20  0506 12/01/20  0827 11/30/20  0532 11/29/20  1029   GLUCOSE mg/dL 83 86 131* 147* 144* 119* 127*   SODIUM mmol/L 135* 139 140 140 145 147* 150*   POTASSIUM mmol/L 4.2 4.1 4.1 4.2 4.4 3.9 4.3   CO2 mmol/L 29.1* 31.1* 29.6* 28.9 26.7 24.7 23.7   CHLORIDE mmol/L 100 105 105 106 112* 117* 121*   ANION GAP mmol/L 5.9 2.9* 5.4 5.1 6.3 5.3 5.3   CREATININE mg/dL 0.77 0.63* 0.71* 0.72* 0.80 0.74* 0.80   BUN mg/dL 21 23 23 22 24* 23 24*   BUN / CREAT RATIO  27.3* 36.5* 32.4* 30.6* 30.0* 31.1* 30.0*   CALCIUM mg/dL 8.6 8.4* 8.4* 8.6 8.7 8.5* 8.5*   EGFR IF NONAFRICN AM mL/min/1.73 102 129 112 111 98 107 98   ALK PHOS U/L  --   --   --   --   --   --  48   TOTAL PROTEIN g/dL  --   --   --   --   --   --  5.1*   ALT (SGPT) U/L  --   --   --   --   --   --  8   AST (SGOT) U/L  --   --   --   --   --   --  10   BILIRUBIN mg/dL  --   --   --   --   --   --  <0.2   ALBUMIN  g/dL  --   --   --   --   --   --  2.50*   GLOBULIN gm/dL  --   --   --   --   --   --  2.6     Estimated Creatinine Clearance: 70.1 mL/min (by C-G formula based on SCr of 0.77 mg/dL).      Results from last 7 days   Lab Units 11/29/20  0836   WBC 10*3/mm3 9.12   RBC 10*6/mm3 3.86*   HEMOGLOBIN g/dL 11.1*   HEMATOCRIT % 35.3*   MCV fL 91.5   MCH pg 28.8   MCHC g/dL 31.4*   RDW % 13.4   RDW-SD fl 45.1   MPV fL 9.9   PLATELETS 10*3/mm3 236   NEUTROPHIL % % 86.9*   LYMPHOCYTE % % 7.1*   MONOCYTES % % 3.2*   EOSINOPHIL % % 1.9   BASOPHIL % % 0.2   IMM GRAN % % 0.7*   NEUTROS ABS 10*3/mm3 7.93*   LYMPHS ABS 10*3/mm3 0.65*   MONOS ABS 10*3/mm3 0.29   EOS ABS 10*3/mm3 0.17   BASOS ABS 10*3/mm3 0.02   IMMATURE GRANS (ABS) 10*3/mm3 0.06*   NRBC /100 WBC 0.0                             Microbiology Results (last 10 days)     Procedure Component Value - Date/Time    COVID PRE-OP / PRE-PROCEDURE SCREENING ORDER (NO ISOLATION) - Swab, Nasopharynx [[id number]]  (Normal) Collected: 12/03/20 0748    Lab Status: Final result Specimen: Swab from Nasopharynx Updated: 12/03/20 0905    Narrative:      The following orders were created for panel order COVID PRE-OP / PRE-PROCEDURE SCREENING ORDER (NO ISOLATION) - Swab, Nasopharynx.  Procedure                               Abnormality         Status                     ---------                               -----------         ------                     Respiratory Panel PCR w/...[[id number]]  Normal              Final result                 Please view results for these tests on the individual orders.    Respiratory Panel PCR w/COVID-19(SARS-CoV-2) QUINTIN/ESTER/RODRIGO/PAD/COR/MAD/NATALY In-House, NP Swab in UTM/[facility], 3-4 HR TAT - Swab, Nasopharynx [[id number]]  (Normal) Collected: 12/03/20 6548    Lab Status: Final result Specimen: Swab from Nasopharynx Updated: 12/03/20 0905     ADENOVIRUS, PCR Not Detected     Coronavirus 229E Not Detected     Coronavirus HKU1 Not Detected     Coronavirus NL63 Not  Detected     Coronavirus OC43 Not Detected     COVID19 Not Detected     Human Metapneumovirus Not Detected     Human Rhinovirus/Enterovirus Not Detected     Influenza A PCR Not Detected     Influenza B PCR Not Detected     Parainfluenza Virus 1 Not Detected     Parainfluenza Virus 2 Not Detected     Parainfluenza Virus 3 Not Detected     Parainfluenza Virus 4 Not Detected     RSV, PCR Not Detected     Bordetella pertussis pcr Not Detected     Bordetella parapertussis PCR Not Detected     Chlamydophila pneumoniae PCR Not Detected     Mycoplasma pneumo by PCR Not Detected    Narrative:      Fact sheet for providers: https://docs.[employer]/wp-content/uploads/MZA5797-1190-PW1.1-EUA-Provider-Fact-Sheet-3.pdf    Fact sheet for patients: https://docs.[employer]/wp-content/uploads/HUN1818-9308-SH0.1-EUA-Patient-Fact-Sheet-1.pdf    Respiratory Culture - Aspirate, Cough [[id number]] Collected: 11/28/20 1119    Lab Status: Final result Specimen: Aspirate from Cough Updated: 11/30/20 0903     Respiratory Culture Rare Normal Respiratory Natalya: NO S.aureus/MRSA or Pseudomonas aeruginosa     Gram Stain Rare (1+) Budding yeast      Rare (1+) Epithelial cells per low power field      Few (2+) WBCs per low power field    MRSA Screen, PCR (Inpatient) - Swab, Nares [[id number]]  (Normal) Collected: 11/26/20 1858    Lab Status: Final result Specimen: Swab from Nares Updated: 11/26/20 2127     MRSA PCR No MRSA Detected    Blood Culture - Blood, Arm, Left [[id number]] Collected: 11/26/20 1610    Lab Status: Final result Specimen: Blood from Arm, Left Updated: 12/01/20 1630     Blood Culture No growth at 5 days    Blood Culture - Blood, Arm, Right [[id number]] Collected: 11/26/20 1554    Lab Status: Final result Specimen: Blood from Arm, Right Updated: 12/01/20 1615     Blood Culture No growth at 5 days    Respiratory Panel PCR w/COVID-19(SARS-CoV-2) QUINTIN/ESTER/RODRIGO/PAD/COR/MAD/NATALY In-House, NP Swab in UTM/VTM, 3-4 HR TAT - Swab,  Nasopharynx [[id number]]  (Normal) Collected: 11/26/20 1536    Lab Status: Final result Specimen: Swab from Nasopharynx Updated: 11/26/20 1637     ADENOVIRUS, PCR Not Detected     Coronavirus 229E Not Detected     Coronavirus HKU1 Not Detected     Coronavirus NL63 Not Detected     Coronavirus OC43 Not Detected     COVID19 Not Detected     Human Metapneumovirus Not Detected     Human Rhinovirus/Enterovirus Not Detected     Influenza A PCR Not Detected     Influenza B PCR Not Detected     Parainfluenza Virus 1 Not Detected     Parainfluenza Virus 2 Not Detected     Parainfluenza Virus 3 Not Detected     Parainfluenza Virus 4 Not Detected     RSV, PCR Not Detected     Bordetella pertussis pcr Not Detected     Bordetella parapertussis PCR Not Detected     Chlamydophila pneumoniae PCR Not Detected     Mycoplasma pneumo by PCR Not Detected    Narrative:      Fact sheet for providers: https://"[employer]s.[employer]/wp-content/uploads/QGU9101-5942-XK2.1-EUA-Provider-Fact-Sheet-3.pdf    Fact sheet for patients: https://docs.[employer]/wp-content/uploads/VWC0102-4136-ZH2.1-EUA-Patient-Fact-Sheet-1.pdf              donepezil, 5 mg, Oral, Nightly  enoxaparin, 40 mg, Subcutaneous, Nightly  insulin regular, 0-14 Units, Subcutaneous, Q6H  lansoprazole, 30 mg, Nasogastric, QAM  memantine, 5 mg, Oral, Q12H  QUEtiapine XR, 50 mg, Oral, Nightly  sodium chloride, 10 mL, Intravenous, Q12H      sodium chloride, 30 mL/hr, Last Rate: Stopped (12/04/20 0749)        Diagnostics:  Ct Head Without Contrast    Result Date: 11/27/2020  EMERGENCY NONCONTRAST HEAD CT 11/26/2020  CLINICAL HISTORY: Altered mental status, patient has fever, decreased responsiveness, is intubated.  TECHNIQUE: Spiral CT images were obtained from the base of the skull to the vertex without intravenous contrast. Images were reformatted and submitted in 3 mm thick axial CT section with brain algorithm and 2 mm thick sagittal and coronal reconstructions were performed and  submitted in brain algorithm.  COMPARISON: This is correlated to a remote prior head CT 15 years ago on 06/22/2005.    FINDINGS: Some patchy low-density in the periventricular extending to the subcortical white matter of the cerebral hemispheres consistent with mild-to-moderate small vessel disease. There is a discrete area of encephalomalacia compatible with an old infarct involving the posterior lateral left frontal lobe extending into the anterior left insular cortex that measures 4 x 1.5 x 3 cm in the left middle cerebral artery territory. 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 The remainder of the brain parenchyma is normal in attenuation. Lateral and third ventricles are prominent in size, likely on the basis of central volume loss or atrophy. I see no mass effect, no midline shift and no extra-axial fluid collections are identified and there is no evidence of acute intracranial hemorrhage. The paranasal sinuses, mastoid air cells and middle ear cavities are clear.      1. No acute intracranial abnormality seen. 2. 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 The remainder of the head CT is unremarkable.  Radiation dose reduction techniques were utilized, including automated exposure control and exposure modulation based on body size.  This report was finalized on 11/27/2020 12:20 PM by   Robert Foreman M.D.      Xr Chest 1 View    Result Date: 11/28/2020  ONE VIEW PORTABLE CHEST AT 5:40 AM  HISTORY: ET tube placement. Respiratory failure. Left lower lobe pneumonia.  FINDINGS: The lungs are somewhat hyperinflated with localized pneumonia in the left lower lobe medially showing slight improvement from the chest x-ray of 2 days ago and best visualized on the CT scan performed 2 days ago. The heart size is normal. An ET tube ends 3.2 cm above the wes.  This report was finalized on 11/28/2020 6:38 AM by Dr. Gabriel Graham M.D.      Xr Chest 1 View    Result Date: 11/26/2020  XR CHEST 1 VW-  Clinical: Intubated  FINDINGS: Endotracheal tube superimposes the tracheal column, tip is located approximately 2 cm above the wes. This position is satisfactory. The right lung is clear. No vascular congestion seen. There is subtle opacity at the left lung base which could represent a small amount of pleural fluid or airspace disease. Cardiac size within normal limits allowing for rotational factors. No mediastinal abnormality. No pneumothorax seen.  CONCLUSION: 1. Endotracheal tube position is satisfactory. 2. Subtle left base opacity as described above.  This report was finalized on 11/26/2020 4:00 PM by Dr. Zac Chavarria M.D.      Ct Angiogram Chest    Result Date: 11/26/2020  CT ANGIOGRAM CHEST-  Radiation dose reduction techniques were utilized, including automated exposure control and exposure modulation based on body size.  CLINICAL: Respiratory failure and fever, 64-year-old male.  TECHNIQUE: CT scan of the chest with intravenous contrast, angiogram protocol to include coronal, sagittal and three-dimensional reconstruction  FINDINGS: Thin axial and CT angiogram reconstructed images fail to demonstrate pulmonary embolus. Small pericardial effusion. Heart size within normal limits. The esophagus is satisfactory in course and caliber. Endotracheal tube position is satisfactory. Diameter of the thoracic  aorta is normal. Mild to moderately enlarged left hilar and mediastinal lymph nodes. There is a combination of infiltrate and consolidation within the left lower lobe consistent with pneumonia. Minimal infiltrate is seen within the left upper lobe. Very subtle areas of ground-glass infiltrate seen within the right lung. No pleural effusion identified.  Limited images through the upper abdomen demonstrate bilateral renal calculi and left renal cysts.  CONCLUSION: 1. No pulmonary embolus is demonstrated. 2. Consolidation and infiltrate within the left lower lobe, minimal infiltrate demonstrated within the right lung as well as the left upper lobe. Findings compatible with pneumonia. 3. Small pericardial effusion. 4. Endotracheal tube position is satisfactory. 5. Mild to moderately enlarged mediastinal and hilar lymph nodes.  Findings of this report called to Dr. Sierra in the emergency room at the time of completion, 5:20 PM.   This report was finalized on 11/26/2020 7:15 PM by Dr. Zac Chavarria M.D.      Ct Angiogram Chest    Result Date: 11/14/2020  PROCEDURE: CTA Chest COMPARISON: No relevant comparison or correlation studies available at time of dictation. INDICATIONS: PE suspected, low/intermediate prob, positive D-dimer;Acidosis;Acute kidney failure, unspecified  ;Fever, lethargy, anorexia x's about 2 days per care giver.  Elevated D-dimer.; Pt  Is mentally handicapped from [place].;No chest surgeries. TECHNIQUE:   CTA of the chest is performed with IV contrast. Multiplanar MIP and 3-D reconstructions  images are performed on separate workstation under concurrent radiologist supervision per protocol and reviewed. Radiation dose reduction techniques included automated exposure control or exposure modulation based on body size. Count of known CT and cardiac nuc med studies performed in previous 12 months: 1.  FINDINGS: Examination hindered due to patient's contracted state and mild kyphosis and arms being  within the gantry. There is also some respiratory motion hindering assessment for small pulmonary nodules. Study optimized for evaluation of the pulmonary arteries for pulmonary embolus.    No filling defects seen within the pulmonary arterial tree to indicate acute pulmonary embolus. Lungs are grossly clear, no focal consolidation seen.  A couple of small peripheral subpleural pulmonary nodule suggested, one of the largest in the left lower lobe estimated at approximately 4 mm average diameter. No focal infiltrate seen. Debris noted within the trachea. Esophageal wall prominence is nonspecific. Marked atrophy of the left kidney with large upper pole renal calculus measuring 10 mm seen with cystic changes and decreased enhancement compared to the right kidney.   No gross acute bony abnormality seen.      1. Study limited due to artifact but no evidence of PE. 2. Questionable small pulmonary nodules in this 64-year-old gentleman. Unfortunately, there is respiratory motion artifact. Recommend 6 month follow-up chest CT. 3. Marked atrophy of the left kidney with cystic changes and large upper pole calculus.  Signer Name: Marycarmen Rivera MD  Signed: 11/14/2020 4:19 PM  Workstation Name: [facility]  [facility]    Xr Abdomen Kub    Result Date: 11/29/2020  ONE VIEW PORTABLE ABDOMEN AT 12:51 PM  HISTORY: Cortrak tube placement  FINDINGS: The Cortrak tube ends in the region of the pylorus. The visualized bowel gas pattern is unremarkable.  This report was finalized on 11/29/2020 1:09 PM by Dr. Gabriel Graham M.D.      Results for orders placed during the hospital encounter of 11/26/20   Adult Transthoracic Echo Complete W/ Cont if Necessary Per Protocol    Narrative · Estimated left ventricular EF = 67% Left ventricular systolic function   is normal.  · Left ventricular diastolic function was normal.  · The right ventricular cavity is moderately dilated.  · Moderately reduced right ventricular  systolic function noted.  · Mild tricuspid valve regurgitation is present. Estimated right   ventricular systolic pressure from tricuspid regurgitation is normal (<35   mmHg).  · There is a small (<1cm) pericardial effusion.              Active Hospital Problems    Diagnosis  POA   • **Dysphagia [R13.10]  Unknown   • Acute respiratory failure with hypoxia (CMS/HCC) [J96.01]  Yes   • Oropharyngeal dysphagia [R13.12]  Unknown      Resolved Hospital Problems   No resolved problems to display.         Assessment/Plan     1. Acute hypoxemic respiratory failure wean O2 as tolerated  2. Aspiration pneumonia complete antibiotic course is going to be an ongoing recurrent process because he cannot protect his airway and we cannot keep him from aspirating his own secretions.  3. Non-ST elevation MI type II treated with heparin briefly echo normal LVEF  4. Hypernatremia resolved  5. Oral pharyngeal dysphagia status post PEG tube placement on 12/3/2020  6. Severe protein calorie malnutrition  7. Dementia resume home meds  8. Immobility syndrome    Plan for disposition: I was not informed patient had a bed available today possibly DC tomorrow.  Patient's prognosis is extremely poor he will continue to aspirate and get further pneumonias probably in the near future.    Bon Foy MD  12/05/20  21:17 EST    Time:

## 2021-03-17 NOTE — PROGRESS NOTES
[id number] [facility]  Cardiology Consult      Requesting MD:  Aster Mike Continued Stay Note  [facility]     Patient Name: Arian Persaud  MRN: [id number]  Today's Date: 12/2/2020    Admit Date: 11/26/2020    Discharge Plan     Row Name 12/02/20 1039       Plan    Plan  Return to [facility] and rehab    Plan Comments  Pt failed swallow and will need a peg  He is a wiley of the [employer] with a guardian.  Spoke to Dr. Musa Bedolla about paperwork needed to request peg for patient.  Paperwork filled out by Dr. Bedolla.  Pt moved to [facility].  Spoke to nurse Trudy, and requested she request the surgeon fill out the 2nd request when decision is made to place a peg.  Paperwork place in the patient's folder at the nurses station.[facility]P following        Discharge Codes    No documentation.             Hyacinth Felix RN     Michela Ballard Date of Study          03/17/2021   MRN           [id number]         Gender                 Female   Date of Birth 1957     Room Number            MHL-0729   Age           66 year(s)   Height:       62 inches      Referring Physician    Andrew Leroy   Weight:       204 pounds     Sonographer            Deanne Kendall 

## (undated) DEVICE — KT ORCA ORCAPOD DISP STRL

## (undated) DEVICE — CANN O2 ETCO2 FITS ALL CONN CO2 SMPL A/ 7IN DISP LF

## (undated) DEVICE — BITEBLOCK OMNI BLOC

## (undated) DEVICE — SENSR O2 OXIMAX FNGR A/ 18IN NONSTR

## (undated) DEVICE — ADAPT CLN BIOGUARD AIR/H2O DISP

## (undated) DEVICE — BNDR ABD 4PANEL 12IN MED/LG

## (undated) DEVICE — KT GASTROSTOMY TB PERCUTANEOUS 20F

## (undated) DEVICE — TUBING, SUCTION, 1/4" X 10', STRAIGHT: Brand: MEDLINE